# Patient Record
Sex: FEMALE | Race: WHITE | NOT HISPANIC OR LATINO | Employment: OTHER | URBAN - METROPOLITAN AREA
[De-identification: names, ages, dates, MRNs, and addresses within clinical notes are randomized per-mention and may not be internally consistent; named-entity substitution may affect disease eponyms.]

---

## 2017-05-30 ENCOUNTER — CONVERSION ENCOUNTER (OUTPATIENT)
Dept: OTHER | Facility: HOSPITAL | Age: 77
End: 2017-05-30

## 2018-01-22 ENCOUNTER — OFFICE VISIT CONVERTED (OUTPATIENT)
Dept: FAMILY MEDICINE CLINIC | Age: 78
End: 2018-01-22
Attending: FAMILY MEDICINE

## 2018-02-15 ENCOUNTER — OFFICE VISIT CONVERTED (OUTPATIENT)
Dept: FAMILY MEDICINE CLINIC | Age: 78
End: 2018-02-15
Attending: FAMILY MEDICINE

## 2018-06-11 ENCOUNTER — CONVERSION ENCOUNTER (OUTPATIENT)
Dept: OTHER | Facility: HOSPITAL | Age: 78
End: 2018-06-11

## 2018-07-19 ENCOUNTER — OFFICE VISIT CONVERTED (OUTPATIENT)
Dept: FAMILY MEDICINE CLINIC | Age: 78
End: 2018-07-19
Attending: FAMILY MEDICINE

## 2018-08-02 ENCOUNTER — OFFICE VISIT CONVERTED (OUTPATIENT)
Dept: FAMILY MEDICINE CLINIC | Age: 78
End: 2018-08-02
Attending: NURSE PRACTITIONER

## 2018-10-05 ENCOUNTER — OFFICE VISIT CONVERTED (OUTPATIENT)
Dept: FAMILY MEDICINE CLINIC | Age: 78
End: 2018-10-05
Attending: NURSE PRACTITIONER

## 2018-11-26 ENCOUNTER — OFFICE VISIT CONVERTED (OUTPATIENT)
Dept: FAMILY MEDICINE CLINIC | Age: 78
End: 2018-11-26
Attending: FAMILY MEDICINE

## 2018-12-07 ENCOUNTER — OFFICE VISIT CONVERTED (OUTPATIENT)
Dept: FAMILY MEDICINE CLINIC | Age: 78
End: 2018-12-07
Attending: FAMILY MEDICINE

## 2019-01-10 ENCOUNTER — HOSPITAL ENCOUNTER (OUTPATIENT)
Dept: OTHER | Facility: HOSPITAL | Age: 79
Discharge: HOME | End: 2019-01-10
Attending: FAMILY MEDICINE

## 2019-01-10 LAB
ALT SERPL-CCNC: 12 U/L (ref 10–40)
CHOLEST SERPL-MCNC: 81 MG/DL (ref 107–200)
CHOLEST/HDLC SERPL: 2.2 {RATIO} (ref 3–6)
HDLC SERPL-MCNC: 37 MG/DL (ref 40–60)
LDLC SERPL CALC-MCNC: 15 MG/DL (ref 70–100)
TRIGL SERPL-MCNC: 144 MG/DL (ref 40–150)
VLDLC SERPL-MCNC: 29 MG/DL (ref 5–37)

## 2019-02-18 ENCOUNTER — OFFICE VISIT CONVERTED (OUTPATIENT)
Dept: FAMILY MEDICINE CLINIC | Age: 79
End: 2019-02-18
Attending: FAMILY MEDICINE

## 2019-02-18 ENCOUNTER — HOSPITAL ENCOUNTER (OUTPATIENT)
Dept: OTHER | Facility: HOSPITAL | Age: 79
Discharge: HOME | End: 2019-02-18
Attending: FAMILY MEDICINE

## 2019-02-18 LAB
ALBUMIN SERPL-MCNC: 4 G/DL (ref 3.5–5)
ALBUMIN/GLOB SERPL: 1.2 {RATIO} (ref 1.4–2.6)
ALP SERPL-CCNC: 112 U/L (ref 43–160)
ALT SERPL-CCNC: 16 U/L (ref 10–40)
ANION GAP SERPL CALC-SCNC: 17 MMOL/L (ref 8–19)
AST SERPL-CCNC: 19 U/L (ref 15–50)
BILIRUB SERPL-MCNC: 0.32 MG/DL (ref 0.2–1.3)
BUN SERPL-MCNC: 13 MG/DL (ref 5–25)
BUN/CREAT SERPL: 19 {RATIO} (ref 6–20)
CALCIUM SERPL-MCNC: 9.3 MG/DL (ref 8.7–10.4)
CHLORIDE SERPL-SCNC: 101 MMOL/L (ref 99–111)
CONV CO2: 27 MMOL/L (ref 22–32)
CONV TOTAL PROTEIN: 7.4 G/DL (ref 6.3–8.2)
CREAT UR-MCNC: 0.67 MG/DL (ref 0.5–0.9)
ERYTHROCYTE [DISTWIDTH] IN BLOOD BY AUTOMATED COUNT: 12.9 % (ref 11.5–14.5)
ERYTHROCYTE [SEDIMENTATION RATE] IN BLOOD: 15 MM/H (ref 0–30)
GFR SERPLBLD BASED ON 1.73 SQ M-ARVRAT: >60 ML/MIN/{1.73_M2}
GLOBULIN UR ELPH-MCNC: 3.4 G/DL (ref 2–3.5)
GLUCOSE SERPL-MCNC: 87 MG/DL (ref 65–99)
HBA1C MFR BLD: 13.5 G/DL (ref 12–16)
HCT VFR BLD AUTO: 42.2 % (ref 37–47)
MAGNESIUM SERPL-MCNC: 2.02 MG/DL (ref 1.6–2.3)
MCH RBC QN AUTO: 28 PG (ref 27–31)
MCHC RBC AUTO-ENTMCNC: 32 G/DL (ref 33–37)
MCV RBC AUTO: 87.6 FL (ref 81–99)
OSMOLALITY SERPL CALC.SUM OF ELEC: 289 MOSM/KG (ref 273–304)
PLATELET # BLD AUTO: 177 10*3/UL (ref 130–400)
PMV BLD AUTO: 11.7 FL (ref 7.4–10.4)
POTASSIUM SERPL-SCNC: 4.6 MMOL/L (ref 3.5–5.3)
RBC # BLD AUTO: 4.82 10*6/UL (ref 4.2–5.4)
SODIUM SERPL-SCNC: 140 MMOL/L (ref 135–147)
TSH SERPL-ACNC: 0.84 M[IU]/L (ref 0.27–4.2)
URATE SERPL-MCNC: 6.3 MG/DL (ref 2.5–7.5)
WBC # BLD AUTO: 5.97 10*3/UL (ref 4.8–10.8)

## 2019-02-21 ENCOUNTER — CONVERSION ENCOUNTER (OUTPATIENT)
Dept: CARDIOLOGY | Facility: CLINIC | Age: 79
End: 2019-02-21
Attending: INTERNAL MEDICINE

## 2019-04-26 ENCOUNTER — OFFICE VISIT CONVERTED (OUTPATIENT)
Dept: FAMILY MEDICINE CLINIC | Age: 79
End: 2019-04-26
Attending: FAMILY MEDICINE

## 2019-04-26 ENCOUNTER — HOSPITAL ENCOUNTER (OUTPATIENT)
Dept: OTHER | Facility: HOSPITAL | Age: 79
Discharge: HOME | End: 2019-04-26
Attending: FAMILY MEDICINE

## 2019-04-26 LAB
ALBUMIN SERPL-MCNC: 3.9 G/DL (ref 3.5–5)
ALBUMIN/GLOB SERPL: 1.3 {RATIO} (ref 1.4–2.6)
ALP SERPL-CCNC: 117 U/L (ref 43–160)
ALT SERPL-CCNC: 14 U/L (ref 10–40)
ANION GAP SERPL CALC-SCNC: 18 MMOL/L (ref 8–19)
AST SERPL-CCNC: 16 U/L (ref 15–50)
BILIRUB SERPL-MCNC: 0.44 MG/DL (ref 0.2–1.3)
BUN SERPL-MCNC: 13 MG/DL (ref 5–25)
BUN/CREAT SERPL: 17 {RATIO} (ref 6–20)
CALCIUM SERPL-MCNC: 9.1 MG/DL (ref 8.7–10.4)
CHLORIDE SERPL-SCNC: 103 MMOL/L (ref 99–111)
CHOLEST SERPL-MCNC: 112 MG/DL (ref 107–200)
CHOLEST/HDLC SERPL: 3 {RATIO} (ref 3–6)
CK SERPL-CCNC: 23 U/L (ref 35–230)
CONV CO2: 24 MMOL/L (ref 22–32)
CONV CREATININE URINE, RANDOM: 90.2 MG/DL (ref 10–300)
CONV MICROALBUM.,U,RANDOM: 67.3 MG/L (ref 0–20)
CONV TOTAL PROTEIN: 6.8 G/DL (ref 6.3–8.2)
CREAT UR-MCNC: 0.78 MG/DL (ref 0.5–0.9)
ERYTHROCYTE [DISTWIDTH] IN BLOOD BY AUTOMATED COUNT: 13.9 % (ref 11.5–14.5)
EST. AVERAGE GLUCOSE BLD GHB EST-MCNC: 143 MG/DL
FOLATE SERPL-MCNC: 11.1 NG/ML (ref 4.8–20)
GFR SERPLBLD BASED ON 1.73 SQ M-ARVRAT: >60 ML/MIN/{1.73_M2}
GLOBULIN UR ELPH-MCNC: 2.9 G/DL (ref 2–3.5)
GLUCOSE SERPL-MCNC: 108 MG/DL (ref 65–99)
HBA1C MFR BLD: 12.8 G/DL (ref 12–16)
HBA1C MFR BLD: 6.6 % (ref 3.5–5.7)
HCT VFR BLD AUTO: 38.2 % (ref 37–47)
HDLC SERPL-MCNC: 37 MG/DL (ref 40–60)
LDLC SERPL CALC-MCNC: 37 MG/DL (ref 70–100)
MAGNESIUM SERPL-MCNC: 1.99 MG/DL (ref 1.6–2.3)
MCH RBC QN AUTO: 28.1 PG (ref 27–31)
MCHC RBC AUTO-ENTMCNC: 33.5 G/DL (ref 33–37)
MCV RBC AUTO: 84 FL (ref 81–99)
MICROALBUMIN/CREAT UR: 74.6 MG/G{CRE} (ref 0–35)
OSMOLALITY SERPL CALC.SUM OF ELEC: 291 MOSM/KG (ref 273–304)
PLATELET # BLD AUTO: 150 10*3/UL (ref 130–400)
PMV BLD AUTO: 12.8 FL (ref 7.4–10.4)
POTASSIUM SERPL-SCNC: 4.7 MMOL/L (ref 3.5–5.3)
RBC # BLD AUTO: 4.55 10*6/UL (ref 4.2–5.4)
SODIUM SERPL-SCNC: 140 MMOL/L (ref 135–147)
TRIGL SERPL-MCNC: 191 MG/DL (ref 40–150)
TSH SERPL-ACNC: 1.34 M[IU]/L (ref 0.27–4.2)
VIT B12 SERPL-MCNC: 1446 PG/ML (ref 211–911)
VLDLC SERPL-MCNC: 38 MG/DL (ref 5–37)
WBC # BLD AUTO: 6.66 10*3/UL (ref 4.8–10.8)

## 2019-04-27 LAB
ASO AB SERPL-ACNC: 37 [IU]/ML (ref 0–200)
CONV ANTI NUCLEAR ANTIBODY WITH REFLEX: POSITIVE
CONV RHEUMATOID FACTOR IGM: <10 [IU]/ML (ref 0–14)
CRP SERPL-MCNC: 0.9 MG/L (ref 0–5)
ERYTHROCYTE [SEDIMENTATION RATE] IN BLOOD: 8 MM/H (ref 0–30)
PHOSPHATE SERPL-MCNC: 3.3 MG/DL (ref 2.4–4.5)
URATE SERPL-MCNC: 5.6 MG/DL (ref 2.5–7.5)

## 2019-04-29 LAB
B BURGDOR IGG+IGM SER-ACNC: NORMAL
CENTROMERE B AB SER-ACNC: <0.2 AI (ref 0–0.9)
CHROMATIN AB: 0.3 AI (ref 0–0.9)
CONV ANTI DOUBLE STRAND DNA  (REFLEX): 1 IU/ML (ref 0–9)
CONV SS-A ANTIBODY (REFLEX): <0.2 AI (ref 0–0.9)
CONV SS-B ANTIBODY (REFLEX): <0.2 AI (ref 0–0.9)
ENA JO1 AB SER IA-ACNC: <0.2 AI (ref 0–0.9)
ENA RNP AB SER-ACNC: 0.7 AI (ref 0–0.9)
ENA SCL70 AB SER QL IA: <0.2 AI (ref 0–0.9)
ENA SM AB SER-ACNC: 0.2 AI (ref 0–0.9)
Lab: NORMAL

## 2019-06-12 ENCOUNTER — HOSPITAL ENCOUNTER (OUTPATIENT)
Dept: OTHER | Facility: HOSPITAL | Age: 79
Discharge: HOME | End: 2019-06-12
Attending: FAMILY MEDICINE

## 2019-06-27 ENCOUNTER — HOSPITAL ENCOUNTER (OUTPATIENT)
Dept: OTHER | Facility: HOSPITAL | Age: 79
Discharge: HOME | End: 2019-06-27
Attending: FAMILY MEDICINE

## 2019-07-01 ENCOUNTER — OFFICE VISIT CONVERTED (OUTPATIENT)
Dept: FAMILY MEDICINE CLINIC | Age: 79
End: 2019-07-01
Attending: FAMILY MEDICINE

## 2019-07-09 ENCOUNTER — OFFICE VISIT CONVERTED (OUTPATIENT)
Dept: FAMILY MEDICINE CLINIC | Age: 79
End: 2019-07-09
Attending: FAMILY MEDICINE

## 2019-07-29 ENCOUNTER — HOSPITAL ENCOUNTER (OUTPATIENT)
Dept: OTHER | Facility: HOSPITAL | Age: 79
Discharge: HOME | End: 2019-07-29
Attending: FAMILY MEDICINE

## 2019-07-29 LAB
CHOLEST SERPL-MCNC: 156 MG/DL (ref 107–200)
CHOLEST/HDLC SERPL: 4.9 {RATIO} (ref 3–6)
EST. AVERAGE GLUCOSE BLD GHB EST-MCNC: 134 MG/DL
HBA1C MFR BLD: 6.3 % (ref 3.5–5.7)
HDLC SERPL-MCNC: 32 MG/DL (ref 40–60)
LDLC SERPL CALC-MCNC: 72 MG/DL (ref 70–100)
TRIGL SERPL-MCNC: 260 MG/DL (ref 40–150)
VLDLC SERPL-MCNC: 52 MG/DL (ref 5–37)

## 2019-12-02 ENCOUNTER — OFFICE VISIT CONVERTED (OUTPATIENT)
Dept: FAMILY MEDICINE CLINIC | Age: 79
End: 2019-12-02
Attending: FAMILY MEDICINE

## 2019-12-12 ENCOUNTER — OFFICE VISIT CONVERTED (OUTPATIENT)
Dept: FAMILY MEDICINE CLINIC | Age: 79
End: 2019-12-12
Attending: FAMILY MEDICINE

## 2019-12-12 ENCOUNTER — HOSPITAL ENCOUNTER (OUTPATIENT)
Dept: OTHER | Facility: HOSPITAL | Age: 79
Discharge: HOME | End: 2019-12-12
Attending: FAMILY MEDICINE

## 2020-01-07 ENCOUNTER — HOSPITAL ENCOUNTER (OUTPATIENT)
Dept: OTHER | Facility: HOSPITAL | Age: 80
Discharge: HOME | End: 2020-01-07
Attending: FAMILY MEDICINE

## 2020-01-07 ENCOUNTER — OFFICE VISIT CONVERTED (OUTPATIENT)
Dept: FAMILY MEDICINE CLINIC | Age: 80
End: 2020-01-07
Attending: FAMILY MEDICINE

## 2020-01-07 LAB
ALBUMIN SERPL-MCNC: 4.2 G/DL (ref 3.5–5)
ALBUMIN/GLOB SERPL: 1.4 {RATIO} (ref 1.4–2.6)
ALP SERPL-CCNC: 87 U/L (ref 43–160)
ALT SERPL-CCNC: 15 U/L (ref 10–40)
ANION GAP SERPL CALC-SCNC: 16 MMOL/L (ref 8–19)
AST SERPL-CCNC: 19 U/L (ref 15–50)
BILIRUB SERPL-MCNC: 0.33 MG/DL (ref 0.2–1.3)
BUN SERPL-MCNC: 15 MG/DL (ref 5–25)
BUN/CREAT SERPL: 19 {RATIO} (ref 6–20)
CALCIUM SERPL-MCNC: 9.6 MG/DL (ref 8.7–10.4)
CHLORIDE SERPL-SCNC: 101 MMOL/L (ref 99–111)
CONV CO2: 25 MMOL/L (ref 22–32)
CONV TOTAL PROTEIN: 7.2 G/DL (ref 6.3–8.2)
CREAT UR-MCNC: 0.8 MG/DL (ref 0.5–0.9)
ERYTHROCYTE [DISTWIDTH] IN BLOOD BY AUTOMATED COUNT: 13.8 % (ref 11.5–14.5)
ERYTHROCYTE [SEDIMENTATION RATE] IN BLOOD: 17 MM/H (ref 0–30)
EST. AVERAGE GLUCOSE BLD GHB EST-MCNC: 126 MG/DL
GFR SERPLBLD BASED ON 1.73 SQ M-ARVRAT: >60 ML/MIN/{1.73_M2}
GLOBULIN UR ELPH-MCNC: 3 G/DL (ref 2–3.5)
GLUCOSE SERPL-MCNC: 108 MG/DL (ref 65–99)
HBA1C MFR BLD: 12.8 G/DL (ref 12–16)
HBA1C MFR BLD: 6 % (ref 3.5–5.7)
HCT VFR BLD AUTO: 39.3 % (ref 37–47)
MCH RBC QN AUTO: 28.7 PG (ref 27–31)
MCHC RBC AUTO-ENTMCNC: 32.6 G/DL (ref 33–37)
MCV RBC AUTO: 88.1 FL (ref 81–99)
OSMOLALITY SERPL CALC.SUM OF ELEC: 285 MOSM/KG (ref 273–304)
PLATELET # BLD AUTO: 204 10*3/UL (ref 130–400)
PMV BLD AUTO: 11.4 FL (ref 7.4–10.4)
POTASSIUM SERPL-SCNC: 5.4 MMOL/L (ref 3.5–5.3)
RBC # BLD AUTO: 4.46 10*6/UL (ref 4.2–5.4)
SODIUM SERPL-SCNC: 137 MMOL/L (ref 135–147)
TSH SERPL-ACNC: 4.58 M[IU]/L (ref 0.27–4.2)
WBC # BLD AUTO: 7.03 10*3/UL (ref 4.8–10.8)

## 2020-01-08 LAB
C DIFF TOX B STL QL CT TISS CULT: NEGATIVE
CONV 027 TOXIN: NEGATIVE

## 2020-01-10 LAB
BACTERIA SPEC AEROBE CULT: NORMAL
O+P SPEC MICRO: NORMAL
O+P SPEC MICRO: NORMAL

## 2020-02-05 ENCOUNTER — OFFICE VISIT CONVERTED (OUTPATIENT)
Dept: FAMILY MEDICINE CLINIC | Age: 80
End: 2020-02-05
Attending: FAMILY MEDICINE

## 2020-02-05 ENCOUNTER — HOSPITAL ENCOUNTER (OUTPATIENT)
Dept: OTHER | Facility: HOSPITAL | Age: 80
Discharge: HOME | End: 2020-02-05
Attending: FAMILY MEDICINE

## 2020-02-17 ENCOUNTER — OFFICE VISIT CONVERTED (OUTPATIENT)
Dept: FAMILY MEDICINE CLINIC | Age: 80
End: 2020-02-17
Attending: FAMILY MEDICINE

## 2020-02-17 ENCOUNTER — HOSPITAL ENCOUNTER (OUTPATIENT)
Dept: OTHER | Facility: HOSPITAL | Age: 80
Discharge: HOME | End: 2020-02-17
Attending: FAMILY MEDICINE

## 2020-02-17 LAB
ERYTHROCYTE [DISTWIDTH] IN BLOOD BY AUTOMATED COUNT: 13.4 % (ref 11.5–14.5)
HBA1C MFR BLD: 13.2 G/DL (ref 12–16)
HCT VFR BLD AUTO: 40.5 % (ref 37–47)
MCH RBC QN AUTO: 28.7 PG (ref 27–31)
MCHC RBC AUTO-ENTMCNC: 32.6 G/DL (ref 33–37)
MCV RBC AUTO: 88 FL (ref 81–99)
PLATELET # BLD AUTO: 169 10*3/UL (ref 130–400)
PMV BLD AUTO: 12.1 FL (ref 7.4–10.4)
RBC # BLD AUTO: 4.6 10*6/UL (ref 4.2–5.4)
WBC # BLD AUTO: 17.61 10*3/UL (ref 4.8–10.8)

## 2020-02-19 LAB — BACTERIA SPEC AEROBE CULT: NORMAL

## 2020-02-24 ENCOUNTER — HOSPITAL ENCOUNTER (OUTPATIENT)
Dept: OTHER | Facility: HOSPITAL | Age: 80
Discharge: HOME | End: 2020-02-24
Attending: FAMILY MEDICINE

## 2020-02-24 ENCOUNTER — OFFICE VISIT CONVERTED (OUTPATIENT)
Dept: FAMILY MEDICINE CLINIC | Age: 80
End: 2020-02-24
Attending: FAMILY MEDICINE

## 2020-02-24 LAB
ERYTHROCYTE [DISTWIDTH] IN BLOOD BY AUTOMATED COUNT: 13.4 % (ref 11.5–14.5)
HBA1C MFR BLD: 13 G/DL (ref 12–16)
HCT VFR BLD AUTO: 39.1 % (ref 37–47)
MCH RBC QN AUTO: 29.1 PG (ref 27–31)
MCHC RBC AUTO-ENTMCNC: 33.2 G/DL (ref 33–37)
MCV RBC AUTO: 87.5 FL (ref 81–99)
PLATELET # BLD AUTO: 197 10*3/UL (ref 130–400)
PMV BLD AUTO: 10.5 FL (ref 7.4–10.4)
RBC # BLD AUTO: 4.47 10*6/UL (ref 4.2–5.4)
TSH SERPL-ACNC: 21.08 M[IU]/L (ref 0.27–4.2)
WBC # BLD AUTO: 7.22 10*3/UL (ref 4.8–10.8)

## 2020-03-12 ENCOUNTER — HOSPITAL ENCOUNTER (OUTPATIENT)
Dept: OTHER | Facility: HOSPITAL | Age: 80
Discharge: HOME | End: 2020-03-12
Attending: FAMILY MEDICINE

## 2020-03-12 ENCOUNTER — OFFICE VISIT CONVERTED (OUTPATIENT)
Dept: FAMILY MEDICINE CLINIC | Age: 80
End: 2020-03-12
Attending: FAMILY MEDICINE

## 2020-03-12 LAB
APPEARANCE UR: ABNORMAL
BACTERIA UR CULT: NORMAL
BACTERIA UR QL AUTO: ABNORMAL
BILIRUB UR QL: NEGATIVE
CASTS URNS QL MICRO: ABNORMAL /[LPF]
COLOR UR: YELLOW
CONV LEUKOCYTE ESTERASE: ABNORMAL
CONV UROBILINOGEN IN URINE BY AUTOMATED TEST STRIP: 0.2 {EHRLICHU}/DL (ref 0.1–1)
EPI CELLS #/AREA URNS HPF: ABNORMAL /[HPF]
GLUCOSE 24H UR-MCNC: NEGATIVE MG/DL
HGB UR QL STRIP: ABNORMAL
KETONES UR QL STRIP: NEGATIVE MG/DL
MUCOUS THREADS URNS QL MICRO: ABNORMAL
NITRITE UR-MCNC: NEGATIVE MG/ML
PH UR STRIP.AUTO: 6 [PH] (ref 5–8)
PROT UR-MCNC: NEGATIVE MG/DL
RBC # BLD AUTO: ABNORMAL /[HPF]
SP GR UR STRIP: 1.02 (ref 1–1.03)
SPECIMEN SOURCE: ABNORMAL
UNIDENT CRYS URNS QL MICRO: ABNORMAL /[HPF]
WBC #/AREA URNS HPF: ABNORMAL /[HPF]

## 2020-03-13 ENCOUNTER — HOSPITAL ENCOUNTER (OUTPATIENT)
Dept: OTHER | Facility: HOSPITAL | Age: 80
Discharge: HOME | End: 2020-03-13
Attending: FAMILY MEDICINE

## 2020-03-14 LAB — BACTERIA UR CULT: NORMAL

## 2020-05-28 ENCOUNTER — HOSPITAL ENCOUNTER (OUTPATIENT)
Dept: OTHER | Facility: HOSPITAL | Age: 80
Discharge: HOME | End: 2020-05-28
Attending: FAMILY MEDICINE

## 2020-05-28 ENCOUNTER — OFFICE VISIT CONVERTED (OUTPATIENT)
Dept: FAMILY MEDICINE CLINIC | Age: 80
End: 2020-05-28
Attending: FAMILY MEDICINE

## 2020-05-28 LAB
ALBUMIN SERPL-MCNC: 3.8 G/DL (ref 3.5–5)
ALBUMIN/GLOB SERPL: 1.4 {RATIO} (ref 1.4–2.6)
ALP SERPL-CCNC: 89 U/L (ref 43–160)
ALT SERPL-CCNC: 15 U/L (ref 10–40)
ANION GAP SERPL CALC-SCNC: 14 MMOL/L (ref 8–19)
APPEARANCE UR: CLEAR
AST SERPL-CCNC: 20 U/L (ref 15–50)
BACTERIA UR CULT: NORMAL
BACTERIA UR QL AUTO: ABNORMAL
BILIRUB SERPL-MCNC: 0.44 MG/DL (ref 0.2–1.3)
BILIRUB UR QL: NEGATIVE
BUN SERPL-MCNC: 14 MG/DL (ref 5–25)
BUN/CREAT SERPL: 18 {RATIO} (ref 6–20)
CALCIUM SERPL-MCNC: 9.2 MG/DL (ref 8.7–10.4)
CASTS URNS QL MICRO: ABNORMAL /[LPF]
CHLORIDE SERPL-SCNC: 101 MMOL/L (ref 99–111)
CHOLEST SERPL-MCNC: 148 MG/DL (ref 107–200)
CHOLEST/HDLC SERPL: 4.6 {RATIO} (ref 3–6)
COLOR UR: ABNORMAL
CONV CO2: 26 MMOL/L (ref 22–32)
CONV CREATININE URINE, RANDOM: 38.8 MG/DL (ref 10–300)
CONV LEUKOCYTE ESTERASE: ABNORMAL
CONV MICROALBUM.,U,RANDOM: 12.9 MG/L (ref 0–20)
CONV TOTAL PROTEIN: 6.5 G/DL (ref 6.3–8.2)
CONV UROBILINOGEN IN URINE BY AUTOMATED TEST STRIP: 0.2 {EHRLICHU}/DL (ref 0.1–1)
CREAT UR-MCNC: 0.78 MG/DL (ref 0.5–0.9)
EPI CELLS #/AREA URNS HPF: ABNORMAL /[HPF]
EST. AVERAGE GLUCOSE BLD GHB EST-MCNC: 151 MG/DL
GFR SERPLBLD BASED ON 1.73 SQ M-ARVRAT: >60 ML/MIN/{1.73_M2}
GLOBULIN UR ELPH-MCNC: 2.7 G/DL (ref 2–3.5)
GLUCOSE 24H UR-MCNC: NEGATIVE MG/DL
GLUCOSE SERPL-MCNC: 126 MG/DL (ref 65–99)
HBA1C MFR BLD: 6.9 % (ref 3.5–5.7)
HDLC SERPL-MCNC: 32 MG/DL (ref 40–60)
HGB UR QL STRIP: NEGATIVE
KETONES UR QL STRIP: NEGATIVE MG/DL
LDLC SERPL CALC-MCNC: 66 MG/DL (ref 70–100)
MICROALBUMIN/CREAT UR: 33.2 MG/G{CRE} (ref 0–35)
MUCOUS THREADS URNS QL MICRO: ABNORMAL
NITRITE UR-MCNC: NEGATIVE MG/ML
OSMOLALITY SERPL CALC.SUM OF ELEC: 284 MOSM/KG (ref 273–304)
PH UR STRIP.AUTO: 6 [PH] (ref 5–8)
POTASSIUM SERPL-SCNC: 4.9 MMOL/L (ref 3.5–5.3)
PROT UR-MCNC: NEGATIVE MG/DL
RBC # BLD AUTO: ABNORMAL /[HPF]
SODIUM SERPL-SCNC: 136 MMOL/L (ref 135–147)
SP GR UR STRIP: 1.01 (ref 1–1.03)
SPECIMEN SOURCE: ABNORMAL
TRIGL SERPL-MCNC: 249 MG/DL (ref 40–150)
TSH SERPL-ACNC: 3.37 M[IU]/L (ref 0.27–4.2)
UNIDENT CRYS URNS QL MICRO: ABNORMAL /[HPF]
VLDLC SERPL-MCNC: 50 MG/DL (ref 5–37)
WBC #/AREA URNS HPF: ABNORMAL /[HPF]

## 2020-05-30 LAB — BACTERIA UR CULT: NORMAL

## 2020-12-10 ENCOUNTER — OFFICE VISIT CONVERTED (OUTPATIENT)
Dept: FAMILY MEDICINE CLINIC | Age: 80
End: 2020-12-10
Attending: FAMILY MEDICINE

## 2020-12-14 ENCOUNTER — HOSPITAL ENCOUNTER (OUTPATIENT)
Dept: OTHER | Facility: HOSPITAL | Age: 80
Discharge: HOME | End: 2020-12-14
Attending: FAMILY MEDICINE

## 2020-12-14 LAB
ALBUMIN SERPL-MCNC: 4.1 G/DL (ref 3.5–5)
ALBUMIN/GLOB SERPL: 1.4 {RATIO} (ref 1.4–2.6)
ALP SERPL-CCNC: 122 U/L (ref 43–160)
ALT SERPL-CCNC: 16 U/L (ref 10–40)
ANION GAP SERPL CALC-SCNC: 15 MMOL/L (ref 8–19)
AST SERPL-CCNC: 19 U/L (ref 15–50)
BILIRUB SERPL-MCNC: 0.29 MG/DL (ref 0.2–1.3)
BUN SERPL-MCNC: 16 MG/DL (ref 5–25)
BUN/CREAT SERPL: 18 {RATIO} (ref 6–20)
CALCIUM SERPL-MCNC: 9.5 MG/DL (ref 8.7–10.4)
CHLORIDE SERPL-SCNC: 102 MMOL/L (ref 99–111)
CONV CO2: 28 MMOL/L (ref 22–32)
CONV TOTAL PROTEIN: 7.1 G/DL (ref 6.3–8.2)
CREAT UR-MCNC: 0.91 MG/DL (ref 0.5–0.9)
ERYTHROCYTE [DISTWIDTH] IN BLOOD BY AUTOMATED COUNT: 13.2 % (ref 11.5–14.5)
EST. AVERAGE GLUCOSE BLD GHB EST-MCNC: 163 MG/DL
GFR SERPLBLD BASED ON 1.73 SQ M-ARVRAT: 59 ML/MIN/{1.73_M2}
GLOBULIN UR ELPH-MCNC: 3 G/DL (ref 2–3.5)
GLUCOSE SERPL-MCNC: 145 MG/DL (ref 65–99)
HBA1C MFR BLD: 13.5 G/DL (ref 12–16)
HBA1C MFR BLD: 7.3 % (ref 3.5–5.7)
HCT VFR BLD AUTO: 42.3 % (ref 37–47)
MCH RBC QN AUTO: 28.3 PG (ref 27–31)
MCHC RBC AUTO-ENTMCNC: 31.9 G/DL (ref 33–37)
MCV RBC AUTO: 88.7 FL (ref 81–99)
OSMOLALITY SERPL CALC.SUM OF ELEC: 294 MOSM/KG (ref 273–304)
PLATELET # BLD AUTO: 191 10*3/UL (ref 130–400)
PMV BLD AUTO: 11.9 FL (ref 7.4–10.4)
POTASSIUM SERPL-SCNC: 5 MMOL/L (ref 3.5–5.3)
RBC # BLD AUTO: 4.77 10*6/UL (ref 4.2–5.4)
SODIUM SERPL-SCNC: 140 MMOL/L (ref 135–147)
TSH SERPL-ACNC: 0.26 M[IU]/L (ref 0.27–4.2)
WBC # BLD AUTO: 6.73 10*3/UL (ref 4.8–10.8)

## 2021-03-10 ENCOUNTER — HOSPITAL ENCOUNTER (OUTPATIENT)
Dept: OTHER | Facility: HOSPITAL | Age: 81
Discharge: HOME | End: 2021-03-10
Attending: FAMILY MEDICINE

## 2021-03-10 LAB
ALBUMIN SERPL-MCNC: 3.9 G/DL (ref 3.5–5)
ALBUMIN/GLOB SERPL: 1.4 {RATIO} (ref 1.4–2.6)
ALP SERPL-CCNC: 98 U/L (ref 43–160)
ALT SERPL-CCNC: 13 U/L (ref 10–40)
ANION GAP SERPL CALC-SCNC: 12 MMOL/L (ref 8–19)
AST SERPL-CCNC: 18 U/L (ref 15–50)
BILIRUB SERPL-MCNC: 0.42 MG/DL (ref 0.2–1.3)
BUN SERPL-MCNC: 15 MG/DL (ref 5–25)
BUN/CREAT SERPL: 23 {RATIO} (ref 6–20)
CALCIUM SERPL-MCNC: 9.2 MG/DL (ref 8.7–10.4)
CHLORIDE SERPL-SCNC: 105 MMOL/L (ref 99–111)
CHOLEST SERPL-MCNC: 119 MG/DL (ref 107–200)
CHOLEST/HDLC SERPL: 3.1 {RATIO} (ref 3–6)
CONV CO2: 26 MMOL/L (ref 22–32)
CONV TOTAL PROTEIN: 6.6 G/DL (ref 6.3–8.2)
CREAT UR-MCNC: 0.64 MG/DL (ref 0.5–0.9)
EST. AVERAGE GLUCOSE BLD GHB EST-MCNC: 192 MG/DL
GFR SERPLBLD BASED ON 1.73 SQ M-ARVRAT: >60 ML/MIN/{1.73_M2}
GLOBULIN UR ELPH-MCNC: 2.7 G/DL (ref 2–3.5)
GLUCOSE SERPL-MCNC: 145 MG/DL (ref 65–99)
HBA1C MFR BLD: 8.3 % (ref 3.5–5.7)
HDLC SERPL-MCNC: 39 MG/DL (ref 40–60)
LDLC SERPL CALC-MCNC: 56 MG/DL (ref 70–100)
OSMOLALITY SERPL CALC.SUM OF ELEC: 291 MOSM/KG (ref 273–304)
POTASSIUM SERPL-SCNC: 4.4 MMOL/L (ref 3.5–5.3)
SODIUM SERPL-SCNC: 139 MMOL/L (ref 135–147)
TRIGL SERPL-MCNC: 119 MG/DL (ref 40–150)
TSH SERPL-ACNC: 0.35 M[IU]/L (ref 0.27–4.2)
VLDLC SERPL-MCNC: 24 MG/DL (ref 5–37)

## 2021-03-22 ENCOUNTER — HOSPITAL ENCOUNTER (OUTPATIENT)
Dept: OTHER | Facility: HOSPITAL | Age: 81
Discharge: HOME | End: 2021-03-22
Attending: FAMILY MEDICINE

## 2021-03-22 ENCOUNTER — OFFICE VISIT CONVERTED (OUTPATIENT)
Dept: FAMILY MEDICINE CLINIC | Age: 81
End: 2021-03-22
Attending: FAMILY MEDICINE

## 2021-03-22 LAB
ERYTHROCYTE [DISTWIDTH] IN BLOOD BY AUTOMATED COUNT: 14.2 % (ref 11.5–14.5)
ERYTHROCYTE [SEDIMENTATION RATE] IN BLOOD: 13 MM/H (ref 0–30)
HBA1C MFR BLD: 13.1 G/DL (ref 12–16)
HCT VFR BLD AUTO: 39.8 % (ref 37–47)
MAGNESIUM SERPL-MCNC: 1.96 MG/DL (ref 1.6–2.3)
MCH RBC QN AUTO: 28.2 PG (ref 27–31)
MCHC RBC AUTO-ENTMCNC: 32.9 G/DL (ref 33–37)
MCV RBC AUTO: 85.8 FL (ref 81–99)
PLATELET # BLD AUTO: 183 10*3/UL (ref 130–400)
PMV BLD AUTO: 12 FL (ref 7.4–10.4)
RBC # BLD AUTO: 4.64 10*6/UL (ref 4.2–5.4)
WBC # BLD AUTO: 5.94 10*3/UL (ref 4.8–10.8)

## 2021-03-25 ENCOUNTER — CONVERSION ENCOUNTER (OUTPATIENT)
Dept: CARDIOLOGY | Facility: CLINIC | Age: 81
End: 2021-03-25
Attending: INTERNAL MEDICINE

## 2021-05-10 NOTE — PROCEDURES
Procedure Note      Patient Name: Zoila Whipple   Patient ID: [id number]   Sex: Female   YOB: 1940    Primary Care Provider: Jewel Chavez MD   Referring Provider: Jewel Chavez MD    Visit Date: March 25, 2021    Provider: William Mclaughlin MD   Location: [facility]   Location Address: [address] [place] [address] A   [place], [place]  [id number]   Location Phone: [phone]          FINAL REPORT   HOLTER MONITOR REPORT  Date: 03/22/2021   Indication: Syncope      FINDINGS:   The patient underwent 24 hours of Holter monitoring. 63%  of the data was analyzable. Minimum heart rate of 53 beats per minute occurred at 7:15 AM.  Maximum heart rate of 111 beats per minute occurred at 5:16 PM.  Average heart rate was 78 beats per minute.  There were 178 PVCs and 37 PACs. No two-second pauses noted. Underlying rhythm was sinus. There was underlying bundle branch block.  No symptoms recorded in the diary.     CONCLUSION:   Unremarkable 24 hours of Holter monitoring  with underlying sinus rhythm, bundle branch block, and low-frequency PVCs.       William Mclaughlin MD, [facility]  PM/pap                   Electronically Signed by: Elsa Adams-, Other -Author on March 26, 2021 01:38:25 PM  Electronically Co-signed by: William Mclaughlin MD -Reviewer on March 28, 2021 04:21:43 PM

## 2021-05-18 NOTE — PROGRESS NOTES
Zoila Whipple 1940     Office/Outpatient Visit    Visit Date: Fri, Dec 7, 2018 09:39 am    Provider: Sky Smith MD (Assistant: Aziza Whipple MA)    Location: [facility]        Electronically signed by Sky Smith MD on  12/12/2018 02:30:57 PM                             SUBJECTIVE:        CC:     Zoila Dixon is a 78 year old White female.  Patient complains of lower back pain.;         HPI:     Burning with urination for the last 3-4 days. Also worsening back pain, no fever, but worsening back pain. She has fatigue but has also been up all night urinating. She has recurrent, frequent UTIs. UTI in July, September, and now. She had a bad UTI 2 years ago that required a PICC line and IV antibiotics qd for 90 days. Last urology visit was over a year ago in [place], Dr Pavon affiliated with [facility].     This is potentially pt's 10th UTI this year.     Pt has type 2 diabetes, A1c has trended down into prediabetic range this year and was 6.3 on 7/19.     ROS:     CONSTITUTIONAL:  Positive for fatigue ( mild ).   Negative for fever.      EYES:  Negative for blurred vision.      E/N/T:  Negative for diminished hearing and nasal congestion.      CARDIOVASCULAR:  Negative for chest pain and palpitations.      RESPIRATORY:  Negative for recent cough and dyspnea.      GASTROINTESTINAL:  Negative for abdominal pain, constipation, diarrhea, nausea and vomiting.      GENITOURINARY:  Positive for dysuria, frequent UTI's and polyuria.   Negative for urinary incontinence.      MUSCULOSKELETAL:  Positive for back pain.   Negative for arthralgias or myalgias.      INTEGUMENTARY/BREAST:  Negative for atypical mole(s) and rash.      NEUROLOGICAL:  Negative for paresthesias and weakness.      PSYCHIATRIC:  Negative for anxiety, depression and sleep disturbance.          PMH/FMH/SH:     Last Reviewed on 12/12/2018 02:30 PM by Sky Smith    Past Medical History:                 PAST  MEDICAL HISTORY         Hypertension    Aortic Valve Replacement - mechanical     Gastroesophageal Reflux Disease     Hypothyroidism         CURRENT MEDICAL PROVIDERS:    Cardiologist: Sky Bowman    Urologist: Dr. Jensen         PREVENTIVE HEALTH MAINTENANCE             BONE DENSITY: was last done 2017 with the following abnormality noted-- osteopenia     COLORECTAL CANCER SCREENING: Up to date (colonoscopy q10y; sigmoidoscopy q5y; Cologuard q3y) was last done 2014, Results are in chart; colonoscopy with the following abnormalities noted-- pelvic fixation - limited exam     EYE EXAM: Diabetic Eye Exam during this calendar year and results are in chart was last done 2018     MAMMOGRAM: Done within last 2 years and results in are chart was last done 18 with normal results         Surgical History:         Appendectomy: at age 24;     Hysterectomy: at age 24; complete;      Hernia Repair: right inguinal;     Laminectomy: lumbar region; 1975;     Aortic Valve Replacement;    Back Surgery;    Skin Cancer Removal; Procedures: colonoscopy /normal heart cath          Family History:         Positive for Myocardial Infarction ( father ).  Father:  at age 49; Cause of death was MI     Mother:  at age 94         Social History:     Occupation: Retired (Prior occupation: [email or web address])     Marital Status:       Children: 5 children         Tobacco/Alcohol/Supplements:     Last Reviewed on 2018 02:30 PM by Sky Smith    Tobacco: She has never smoked.          Alcohol:  Does not drink alcohol and never has.          Substance Abuse History:     Last Reviewed on 2018 02:30 PM by Sky Smith    NEGATIVE         Mental Health History:     Last Reviewed on 2018 02:30 PM by Sky Smith        Communicable Diseases (eg STDs):     Last Reviewed on 2018 02:30 PM by Sky Smith            Current Problems:     Last Reviewed on 2018 02:30 PM by  Sky Smith    History of recurrent UTI's     UTI     Moderate depression     Type 2 diabetes     Hypercholesterolemia     Use of high risk medications     Hypertension     Acquired hypothyroidism     Heart valve replacement patient     GERD     Acute maxillary sinusitis     Dysuria         Immunizations:     Prevnar 13 (Pneumococcal PCV 13) 9/21/2016     Flulaval 9/16/2011     Fluzone pf (3+ years dose) 10/8/2013     Fluzone High-Dose pf (>=65 yr) 10/13/2014     Fluzone High-Dose pf (>=65 yr) 10/26/2015     Fluzone High-Dose pf (>=65 yr) 9/21/2016     Fluzone High-Dose pf (>=65 yr) 9/21/2017     Fluzone High-Dose pf (>=65 yr) 10/5/2018     PNEUMOVAX 23 (Pneumococcal PPV23) 9/21/2017         Allergies:     Last Reviewed on 12/12/2018 02:30 PM by Sky Smith    Sulfonamides: rash    Niacin (Nicotinic Acid):    medrol dose pack: chills, rash (Adverse Reaction)        Current Medications:     Last Reviewed on 12/12/2018 02:30 PM by Sky Smith    Metformin HCl 500mg Tablets, Extended Release Take 2 tablet(s) by mouth daily     Pantoprazole 40mg Tablets, Delayed Release Take 1 tablet(s) by mouth daily     Synthroid 0.075mg Tablet Take 1 tablet(s) by mouth daily     Nystatin/Triamcinolone 100,000U/1gm/0.1% Cream Apply thin film to affected area bid     Accu-Chek Lana Plus Test Strips  Reagent Strips check blood sugar once daily  DXE11.9     Accu-Chek Softclix  Lancet Check blood sugar 1 x per day. DX: E11.9     Atorvastatin Calcium 40mg Tablet Take 1 tablet(s) by mouth daily     Potassium Chloride 20mEq Tablets, Extended Release Take 1 tablet(s) by mouth bid     CoQ10 daily     Fish Oil 1,000mg Softgel capsule 1 / day     Vitamin C     Vitamin D3     Vitamin E 1,000IU Capsules 1 capsule daily     Metoprolol Succinate 25mg Tablets, Extended Release 1 tab PO daily     Ciprofloxacin HCl 500mg Tablet Take 1 tablet(s) by mouth q12h for 10 days         OBJECTIVE:        Vitals:         Current: 12/7/2018  9:48:23 AM    Ht:  5 ft, 6 in;  Wt: 166.8 lbs;  BMI: 26.9    T: 97.6 F (oral);  BP: 136/55 mm Hg (left arm, sitting);  P: 67 bpm (left arm (BP Cuff), sitting);  sCr: 0.67 mg/dL;  GFR: 71.89        Exams:     PHYSICAL EXAM:     GENERAL: vital signs recorded - well developed, well nourished;  no apparent distress;     EYES: extraocular movements intact; conjunctiva and cornea are normal; PERRLA;     E/N/T: EARS:  normal external auditory canals and tympanic membranes;  grossly normal hearing; NOSE: right maxillary sinus tenderness present;     NECK: range of motion is normal; thyroid is non-palpable;     RESPIRATORY: normal respiratory rate and pattern with no distress; normal breath sounds with no rales, rhonchi, wheezes or rubs;     CARDIOVASCULAR: normal rate; rhythm is regular;  a systolic murmur is noted: it is grade 4/6 and best heard in left upper sternal border;  no edema;     GASTROINTESTINAL: nontender; normal bowel sounds; no organomegaly;     BREAST/INTEGUMENT: SKIN: no significant rashes or lesions; no suspicious moles;     MUSCULOSKELETAL: Negative fist percussion bilaterally but SI TTP bilaterally; No CVA tenderness     PSYCHIATRIC:  appropriate affect and demeanor; normal speech pattern; grossly normal memory;         ASSESSMENT           599.0   N39.0  UTI              DDx:     V13.09   N39.0  History of recurrent UTI's              DDx:     250.00   E11.9  Type 2 diabetes              DDx:         ORDERS:         Meds Prescribed:       Refill of: Ciprofloxacin HCl 500mg Tablet Take 1 tablet(s) by mouth q12h for 10 days  #20 (Twenty) tablet(s) Refills: 0         Lab Orders:       [id number]  [facility] with [facility]  (Send-Out)         [id number]  [facility] Basic Metabolic Panel  (Send-Out)         [id number]  CBC [facility] CBC with 3 part diff  (Send-Out)         [id number]  A1CEG [facility] Hemoglobin A1C  (Send-Out)           Procedures Ordered:       REFER  Referral to Specialist or Other Facility   (Send-Out)                   PLAN:          UTICipro refilled as this worked well for recent UTI.     LABORATORY:  Labs ordered to be performed today include basic metabolic panel and CBC.      REFERRALS:  Referral initiated to a urologist ( JERONIMO Yates [facility]; Pt would prefer to stay or [place] or [facility] ).            Prescriptions:       Refill of: Ciprofloxacin HCl 500mg Tablet Take 1 tablet(s) by mouth q12h for 10 days  #20 (Twenty) tablet(s) Refills: 0           Orders:       [id number]  [facility] with [facility]  (Send-Out)         REFER  Referral to Specialist or Other Facility  (Send-Out)         [id number]  [facility] Basic Metabolic Panel  (Send-Out)         [id number]  [facility] diff  (Send-Out)            History of recurrent UTI's Pt has several UTIs this year, referred to urology.          Type 2 diabetes Fortunately A1c has trended down to pre-diabetic range. Will reorder to given labs will already be drawn to help ensure A1c is still normalizing.     LABORATORY:  Labs ordered to be performed today include HgbA1C.            Orders:       [id number]  A1C - [facility] Hemoglobin A1C  (Send-Out)               CHARGE CAPTURE           **Please note: ICD descriptions below are intended for billing purposes only and may not represent clinical diagnoses**        Primary Diagnosis:         599.0 UTI            N39.0    Urinary tract infection, site not specified              Orders:          [id number]   Office/outpatient visit; established patient, level 3  (In-House)           V13.09 History of recurrent UTI's            N39.0    Urinary tract infection, site not specified    250.00 Type 2 diabetes            E11.9    Type 2 diabetes mellitus without complications

## 2021-05-18 NOTE — PROGRESS NOTES
Zoila Whipple  1940     Office/Outpatient Visit    Visit Date: Thu, May 28, 2020 02:20 pm    Provider: Jewel Chavez MD (Assistant: Radha Walden MA)    Location: [facility]        Electronically signed by Jewel Chavez MD on  05/30/2020 08:52:18 AM                             Subjective:        CC: 'my feet', diabetes    HPI:       Zoila Dixon is being seen today for evaluation of her feet.  She has been dealing with this for the last 6 weeks.  She has some pain but that is not new.  She does take two 'fluid pills' a day in the form of furosemide.  She feels that her breathing is okay most of the time.  She does have times that it is not as good.          Additionally, she presents with history of type 2 diabetes mellitus without complications.  current meds include nothing.  She does not perform home blood glucose monitoring.  Most recent lab results include Hemoglobin A1c:  6.0 (%) (01/07/2020), LDL:  72 (mg/dL) (07/29/2019).            Additionally, she presents with history of other specified hypothyroidism.  she is currently taking Levothyroid, 100 mcg daily.  TSH was last checked 3 months ago.  The result was reported as high.  Currently, she is experiencing edema.            With regard to the essential (primary) hypertension, her current cardiac medication regimen includes a diuretic ( Lasix ), a beta-blocker ( Toprol-XL ), and an ACE inhibitor ( Zestril ).  She did not bring her blood pressure diary, but says that pressures have been too high.  She is tolerating the medication well without side effects.  Compliance with treatment has been good; she takes her medication as directed and follows up as directed.      ROS:     CONSTITUTIONAL:  Negative for chills and fever.      CARDIOVASCULAR:  Negative for chest pain and palpitations.      RESPIRATORY:  Negative for recent cough and dyspnea.      GASTROINTESTINAL:  Negative for abdominal pain, nausea and vomiting.       INTEGUMENTARY/BREAST:  Negative for atypical mole(s) and rash.          Past Medical History / Family History / Social History:         Last Reviewed on 2020 02:28 PM by Jewel Chavez    Past Medical History:                 PAST MEDICAL HISTORY         Hypertension    Aortic Valve Replacement - mechanical     Gastroesophageal Reflux Disease     Hypothyroidism         CURRENT MEDICAL PROVIDERS:    Cardiologist: Sky Bowman    Urologist: Dr. Jensen             ADVANCE DIRECTIVES: Living will - Her children would make decisions for her if needed.          PREVENTIVE HEALTH MAINTENANCE             BONE DENSITY: was last done 19 with the following abnormality noted-- Osteopenia     COLORECTAL CANCER SCREENING: Up to date (colonoscopy q10y; sigmoidoscopy q5y; Cologuard q3y) was last done 2014, Results are in chart; colonoscopy with the following abnormalities noted-- pelvic fixation - limited exam     EYE EXAM: Diabetic Eye Exam during this calendar year and results are in chart was last done 2019     MAMMOGRAM: Done within last 2 years and results in are chart was last done 19 with normal results         Surgical History:         Appendectomy: at age 24;     Hysterectomy: at age 24; complete;     Hernia Repair: right inguinal;     Laminectomy: lumbar region; 1975;     Aortic Valve Replacement;    Back Surgery;    Skin Cancer Removal; Procedures: colonoscopy /normal heart cath          Family History:         Positive for Myocardial Infarction ( father ).  Father:  at age 49; Cause of death was MI     Mother:  at age 94         Social History:     Occupation: Retired (Prior occupation: [employer])     Marital Status:       Children: 5 children         Tobacco/Alcohol/Supplements:     Last Reviewed on 2020 02:28 PM by Jewel Chavez    Tobacco: She has never smoked.          Alcohol:  Does not drink alcohol and never has.          Substance Abuse  History:     Last Reviewed on 5/28/2020 02:28 PM by Jewel Chavez    NEGATIVE         Mental Health History:     Last Reviewed on 5/28/2020 02:28 PM by Jewel Chavez        Communicable Diseases (eg STDs):     Last Reviewed on 5/28/2020 02:28 PM by Jewel Chavez        Current Problems:     Last Reviewed on 5/28/2020 02:28 PM by Jewel Chavez    Gastro-esophageal reflux disease without esophagitis    Presence of prosthetic heart valve    Other specified hypothyroidism    Essential (primary) hypertension    Long term (current) use of anticoagulants    Mixed hyperlipidemia    Type 2 diabetes mellitus without complications    Dysuria    Major depressive disorder, single episode, moderate    Urinary tract infection, site not specified    Syncope and collapse    Other fatigue    Impacted cerumen, right ear    Acute upper respiratory infection, unspecified    Pain in left arm    Pain in left hand    Laceration without foreign body of scalp, initial encounter    Diarrhea, unspecified    Pneumonia, unspecified organism    Right upper quadrant pain        Immunizations:     Havrix -adult dose (HepA) 6/18/2018    Havrix -adult dose (HepA) 12/19/2018    Prevnar 13 (Pneumococcal PCV 13) 9/21/2016    Flulaval 9/16/2011    Fluzone pf (3+ years dose) 10/8/2013    Fluzone High-Dose pf (>=65 yr) 10/13/2014    Fluzone High-Dose pf (>=65 yr) 10/26/2015    Fluzone High-Dose pf (>=65 yr) 9/21/2016    Fluzone High-Dose pf (>=65 yr) 9/21/2017    Fluzone High-Dose pf (>=65 yr) 10/5/2018    Fluzone High-Dose pf (>=65 yr) 9/30/2019    PNEUMOVAX 23 (Pneumococcal PPV23) 9/21/2017        Allergies:     Last Reviewed on 5/28/2020 02:28 PM by Jewel Chavez    medrol dose pack: Rash,chills  (Adverse Reaction)    Sulfonamides: Rash     Bactrim:      Niacin (Nicotinic Acid):          Current Medications:     Last Reviewed on 5/28/2020 02:28 PM by Jewel Chavez    Vitamin E 1,000 unit oral capsule  [1 capsule daily]    Endur-C with mary jo hips     lisinopriL 10 mg oral tablet [TAKE 1 TABLET EVERY DAY]    pantoprazole 40 mg oral tablet, delayed release (enteric coated) [TAKE 1 TABLET EVERY DAY]    Fish Oil 300-1,000 mg oral capsule [1 / day]    Accu-Chek Lana Plus Test Strips  Reagent Strips [check blood sugar once daily  DXE11.9]    Accu-Chek Softclix  Lancet [Check blood sugar 1 x per day. DX: E11.9]    Metoprolol Succinate 25 mg oral Tablet, Extended Release 24 hr [1 tab PO daily]    Cephalexin 250 mg oral capsule [1 capsule daily]    metFORMIN 500 mg oral Tablet, Extended Release 24 hr [Take 2 tablet(s) by mouth daily]    Kids Vitamin D3     CoQ10 daily     Uribel 118-10-40.8-36 mg oral capsule [take 1 bid prn ]    Furosemide 20 mg oral tablet [1 tab daily]    potassium chloride 10 mEq oral capsule, extended release [one BID]    Transderm-Scop 1 mg over 3 days Transdermal Patch,Transdermal 3 day [apply 1 patch by transdermal route to the hairless area behind 1 ear at least 4 hr before effect is required; reapply every 3 days as needed]    nebulizers kit  [use tid with albuterol DX J18.9]    albuterol sulfate 2.5 mg /3 mL (0.083 %) Inhalation Solution for Nebulization [inhale 3 milliliters (2.5 mg) by nebulization route 3 times per day Dx J18.9]    levothyroxine 100 mcg oral tablet [take 1 tablet (100 mcg) by oral route once daily]        Objective:        Vitals:         Current: 5/28/2020 2:23:56 PM    Ht:  5 ft, 6 in;  Wt: 178.4 lbs;  BMI: 28.8T: 97.9 F (oral);  BP: 150/62 mm Hg (left arm, sitting);  sCr: 0.8 mg/dL;  GFR: 60.96        Exams:     PHYSICAL EXAM:     GENERAL: vital signs recorded - well developed, well nourished;  no apparent distress;     EYES: extraocular movements intact; conjunctiva and cornea are normal; PERRL;     NECK: range of motion is normal; thyroid is non-palpable;     RESPIRATORY: normal respiratory rate and pattern with no distress; normal breath sounds with no rales, rhonchi,  wheezes or rubs;     CARDIOVASCULAR: normal rate; rhythm is regular;  a systolic murmur is noted: it is grade 4/6;  1+ pedal edema;     GASTROINTESTINAL: nontender; normal bowel sounds; no organomegaly;     LYMPHATIC: no enlargement of cervical or facial nodes; no supraclavicular nodes;     BREAST/INTEGUMENT: SKIN: no significant rashes or lesions; no suspicious moles;     NEUROLOGIC: mental status: alert and oriented x 3; cranial nerves II-XII grossly intact;     PSYCHIATRIC: appropriate affect and demeanor; normal psychomotor function;     Foot exam performed.      Left foot exam    Protective sensation using Monofilament test: NORMAL sensation. Patient detects .07 grams of force which is considered normal.    Vascular status: normal peripheral vascular exam with palpable dorsal pedal and posterior tibal pulses and brisk digital capillary refill    Skin is intact without sores or ulcers    Right foot exam    Protective sensation using Monofilament test: NORMAL sensation. Patient detects .07 grams of force which is considered normal.    Vascular status: normal peripheral vascular exam with palpable dorsal pedal and posterior tibal pulses and brisk digital capillary refill    Skin is intact without sores or ulcers         Assessment:         R60.0   Localized edema       E11.9   Type 2 diabetes mellitus without complications       E03.8   Other specified hypothyroidism       I10   Essential (primary) hypertension           ORDERS:         Radiology/Test Orders:       3017F  Colorectal CA screen results documented and reviewed (PV)  (In-House)            2022F  Dilated retinal eye exam w/interpret by ophthalmologist/optometrist documented/reviewed (DM)4  (In-House)              Lab Orders:       [id number]  DIAB2 - [facility] CMP A1C LIPID AND MICRO ALBUM CR RATIO: [id number]  (Send-Out)            57161  TSH - [facility] TSH  (Send-Out)            84795  BDUAM - [facility] Urinalysis, automated, with micro  (Send-Out)               Other Orders:       2028F  Foot examination performed (includes examination through visual inspection, sensory exam with monofilament, and pulse exam - report when any of the three components are completed) (DM)4  (In-House)            1100F  Pt screen for fall risk; document 2+ falls in the past yr or any fall w/injury in past year (FREDIS)  (In-House)              Screening mammogram results documented  (Send-Out)            1123F  Advance Care Planning discussed and doc; advance care plan or surrogate decision maker doc. in MR  (Send-Out)                      Plan:         Localized edema        RECOMMENDATIONS given include: She does have some swelling of the lower legs.  I suspect it is a combination of factors and hope that it will be short-lived.  We will update some blood work on her as noted below.  No other near term changes are anticipated..  MIPS Has fallen 2+ times in the past year or had one fall with an injury in the past year Vaccines Flu and Pneumonia updated in Shot record Screening mammomgram done within last 2 years and results in are chart Colorectal Cancer Screening is up to date and the results are in the chart Diabetic Eye Exam during this calendar year and results are in chart ACP discussion: Advance Directive/Surrogate Decision Maker discussed and scanned into chart           Orders:       1100F  Pt screen for fall risk; document 2+ falls in the past yr or any fall w/injury in past year (FREDIS)  (In-House)              Screening mammogram results documented  (Send-Out)            3017F  Colorectal CA screen results documented and reviewed (PV)  (In-House)            2022F  Dilated retinal eye exam w/interpret by ophthalmologist/optometrist documented/reviewed (DM)4  (In-House)            1123F  Advance Care Planning discussed and doc; advance care plan or surrogate decision maker doc. in MR  (Send-Out)              Type 2 diabetes mellitus without complications    LABORATORY:   Labs ordered to be performed today include Diabetes Panel 2;CMP, A1C, Lipid, Microalbumin:Creatinine Ratio and urinalysis with micro.            Orders:       2028F  Foot examination performed (includes examination through visual inspection, sensory exam with monofilament, and pulse exam - report when any of the three components are completed) (DM)4  (In-House)            91312  DIAB2 - [facility] CMP A1C LIPID AND MICRO ALBUM CR RATIO: [id number]  (Send-Out)            44085  BDUAM - [facility] Urinalysis, automated, with micro  (Send-Out)              Other specified hypothyroidism    LABORATORY:  Labs ordered to be performed today include TSH.            Orders:       12822  TSH - [facility] TSH  (Send-Out)              Essential (primary) hypertensionAs above.            Charge Capture:         Primary Diagnosis:     R60.0  Localized edema           Orders:      52345  Office/outpatient visit; established patient, level 4  (In-House)            1100F  Pt screen for fall risk; document 2+ falls in the past yr or any fall w/injury in past year (FREDIS)  (In-House)            3017F  Colorectal CA screen results documented and reviewed (PV)  (In-House)            2022F  Dilated retinal eye exam w/interpret by ophthalmologist/optometrist documented/reviewed (DM)4  (In-House)              E11.9  Type 2 diabetes mellitus without complications           Orders:      2028F  Foot examination performed (includes examination through visual inspection, sensory exam with monofilament, and pulse exam - report when any of the three components are completed) (DM)4  (In-House)              E03.8  Other specified hypothyroidism     I10  Essential (primary) hypertension

## 2021-05-18 NOTE — PROGRESS NOTES
Zoila WhippleJourdan 1940     Office/Outpatient Visit    Visit Date: Thu, Feb 15, 2018 10:43 am    Provider: Jewel Chavez MD (Assistant: Niki Adorno MA)    Location: [facility]        Electronically signed by Jewel Chavez MD on  02/16/2018 07:58:07 AM                             SUBJECTIVE:        CC: depression         HPI:     Zoila Dixon is in today for follow up on what may be depression.  Her  passed away in October.  It was a shock.  She has been dealing with some other family issues as well.  She continues to have very significant sadness and feeling down.  She denies suicidal thoughts.  She denies guilt.  Her energy level is 'not like it should be'.  She lacks motivation to do much.  She has some sleep difficulty as well.  It is hard for her to get into a regular sleep pattern.     ROS:     CONSTITUTIONAL:  Negative for chills and fever.      CARDIOVASCULAR:  Negative for chest pain and palpitations.      RESPIRATORY:  Negative for recent cough and dyspnea.      GASTROINTESTINAL:  Negative for abdominal pain, nausea and vomiting.      INTEGUMENTARY/BREAST:  Negative for atypical mole(s) and rash.          PMH/FMH/SH:     Last Reviewed on 2/15/2018 10:54 AM by Jewel Chavez    Past Medical History:                 PAST MEDICAL HISTORY         Hypertension    Aortic Valve Replacement - mechanical     Gastroesophageal Reflux Disease     Hypothyroidism         CURRENT MEDICAL PROVIDERS:    Cardiologist: Sky Bowman    Urologist: Dr. Jensen         PREVENTIVE HEALTH MAINTENANCE             COLORECTAL CANCER SCREENING: Up to date (colonoscopy q10y; sigmoidoscopy q5y; Cologuard q3y) was last done 04/2014, Results are in chart; colonoscopy with the following abnormalities noted-- pelvic fixation - limited exam     MAMMOGRAM: Done within last 2 years and results in are chart was last done 06/2017 with normal results         Surgical History:         Appendectomy: at age 24;      Hysterectomy: at age 24; complete;      Hernia Repair: right inguinal;     Laminectomy: lumbar region; 1975;     Aortic Valve Replacement;    Back Surgery;    Skin Cancer Removal; Procedures: colonoscopy /normal heart cath          Family History:         Positive for Myocardial Infarction ( father ).  Father:  at age 49; Cause of death was MI     Mother:  at age 94         Social History:     Occupation: Retired (Prior occupation: [employer])     Marital Status:      Children: 5 children         Tobacco/Alcohol/Supplements:     Last Reviewed on 2/15/2018 10:54 AM by Jewel Chavez    Tobacco: She has never smoked.          Alcohol:  Does not drink alcohol and never has.          Substance Abuse History:     Last Reviewed on 2/15/2018 10:54 AM by Jewel Chavez    NEGATIVE         Mental Health History:     Last Reviewed on 2/15/2018 10:54 AM by Jewel Chavez        Communicable Diseases (eg STDs):     Last Reviewed on 2/15/2018 10:54 AM by Jewel Chavez            Current Problems:     Last Reviewed on 2/15/2018 10:54 AM by Jewel Chavez    Type 2 diabetes     Hypercholesterolemia     Use of high risk medications     Hypertension     Acquired hypothyroidism     Heart valve replacement patient     GERD     Dysuria         Immunizations:     Prevnar 13 (Pneumococcal PCV 13) 2016     Flulaval 2011     Fluzone pf (3+ years dose) 10/8/2013     Fluzone High-Dose pf (>=65 yr) 10/13/2014     Fluzone High-Dose pf (>=65 yr) 10/26/2015     Fluzone High-Dose pf (>=65 yr) 2016     Fluzone High-Dose pf (>=65 yr) 2017     PNEUMOVAX 23 (Pneumococcal PPV23) 2017         Allergies:     Last Reviewed on 2/15/2018 10:54 AM by Jewel Chavez    Sulfonamides: rash    Niacin (Nicotinic Acid):        Current Medications:     Last Reviewed on 2/15/2018 10:54 AM by Jewel Chavez    Pantoprazole 40mg Tablets, Delayed Release Take 1  tablet(s) by mouth daily     Synthroid 0.075mg Tablet Take 1 tablet(s) by mouth daily     Metformin HCl 500mg Tablets, Extended Release 2 po daily     Accu-Chek Lana Plus Test Strips  Reagent Strips check blood sugar once daily  DXE11.9     Accu-Chek Softclix  Lancet Check blood sugar 1 x per day. DX: E11.9     Metoprolol Succinate 25mg Tablets, Extended Release     Atorvastatin Calcium 40mg Tablet Take 1 tablet(s) by mouth daily     Potassium Chloride 20mEq Tablets, Extended Release Take 1 tablet(s) by mouth bid         OBJECTIVE:        Vitals:         Current: 2/15/2018 10:45:25 AM    Ht:  5 ft, 6 in;  Wt: 161.7 lbs;  BMI: 26.1    T: 97.5 F (oral);  BP: 129/68 mm Hg (left arm, sitting);  P: 83 bpm (left arm (BP Cuff), sitting);  sCr: 0.65 mg/dL;  GFR: 74.30        Exams:     PHYSICAL EXAM:     GENERAL: vital signs recorded - well developed, well nourished;  no apparent distress;     NECK: range of motion is normal; thyroid is non-palpable;     RESPIRATORY: normal respiratory rate and pattern with no distress; normal breath sounds with no rales, rhonchi, wheezes or rubs;     CARDIOVASCULAR: normal rate; rhythm is regular;  no systolic murmur; no edema;     GASTROINTESTINAL: nontender; normal bowel sounds; no organomegaly;     BREAST/INTEGUMENT: SKIN: no significant rashes or lesions; no suspicious moles;     PSYCHIATRIC: affect/demeanor: depressed;         ASSESSMENT           296.22   F32.1  Moderate depression              DDx:         ORDERS:         Meds Prescribed:       Lexapro (Escitalopram Oxalate) 10mg Tablet Take 1 tablet(s) by mouth daily  #30 (Thirty) tablet(s) Refills: 5       Trazodone HCl 50mg Tablet Take 1/2 to 1 tablet(s) by mouth at bedtime  #30 (Thirty) tablet(s) Refills: 0                 PLAN:          Moderate depression         RECOMMENDATIONS given include: She is clinically depressed.  I'm going to recommend treatment as noted below for her.  We will also give her something to help with  sleep for the near term.  No other near term changes are anticipated..            Prescriptions:       Lexapro (Escitalopram Oxalate) 10mg Tablet Take 1 tablet(s) by mouth daily  #30 (Thirty) tablet(s) Refills: 5       Trazodone HCl 50mg Tablet Take 1/2 to 1 tablet(s) by mouth at bedtime  #30 (Thirty) tablet(s) Refills: 0           Patient Education Handouts:       Depression (Depressive Disorder)              CHARGE CAPTURE           **Please note: ICD descriptions below are intended for billing purposes only and may not represent clinical diagnoses**        Primary Diagnosis:         296.22 Moderate depression            F32.1    Major depressive disorder, single episode, moderate              Orders:          02378   Office/outpatient visit; established patient, level 3  (In-House)

## 2021-05-18 NOTE — PROGRESS NOTES
Zoila Whipple 1940     Office/Outpatient Visit    Visit Date: Tue, Jul 9, 2019 01:39 pm    Provider: Jewel Chavez MD (Assistant: Kira Mccollum MA)    Location: [facility]        Electronically signed by Jewel Chavez MD on  07/10/2019 08:01:03 AM                             SUBJECTIVE:        CC: blood pressure         HPI:         Zoila Dixon presents with hypertension.  Her current cardiac medication regimen includes a diuretic ( Lasix ) and a beta-blocker ( Toprol-XL ).  She did not bring her blood pressure diary, but says that pressures have been too high.  She is tolerating the medication well without side effects.  Compliance with treatment has been good; she takes her medication as directed and follows up as directed.          Dx with hypercholesterolemia; current treatment includes a low cholesterol/low fat diet.  Compliance with treatment has been good; she maintains her low cholesterol diet and follows up as directed.      ROS:     CONSTITUTIONAL:  Negative for chills and fever.      CARDIOVASCULAR:  Negative for chest pain and palpitations.      RESPIRATORY:  Negative for recent cough and dyspnea.      GASTROINTESTINAL:  Negative for abdominal pain, nausea and vomiting.          PMH/FMH/SH:     Last Reviewed on 7/01/2019 12:00 PM by Jewel Chavez    Past Medical History:                 PAST MEDICAL HISTORY         Hypertension    Aortic Valve Replacement - mechanical     Gastroesophageal Reflux Disease     Hypothyroidism         CURRENT MEDICAL PROVIDERS:    Cardiologist: Sky Bowman    Urologist: Dr. Jensen             ADVANCE DIRECTIVES: Living will - Her children would make decisions for her if needed.          PREVENTIVE HEALTH MAINTENANCE             BONE DENSITY: was last done 6/27/19 with the following abnormality noted-- Osteopenia     COLORECTAL CANCER SCREENING: Up to date (colonoscopy q10y; sigmoidoscopy q5y; Cologuard q3y) was last done 04/2014, Results are  in chart; colonoscopy with the following abnormalities noted-- pelvic fixation - limited exam     EYE EXAM: Diabetic Eye Exam during this calendar year and results are in chart was last done 2018     MAMMOGRAM: Done within last 2 years and results in are chart was last done 19 with normal results         Surgical History:         Appendectomy: at age 24;     Hysterectomy: at age 24; complete;      Hernia Repair: right inguinal;     Laminectomy: lumbar region; 1975;     Aortic Valve Replacement;    Back Surgery;    Skin Cancer Removal; Procedures: colonoscopy /normal heart cath          Family History:         Positive for Myocardial Infarction ( father ).  Father:  at age 49; Cause of death was MI     Mother:  at age 94         Social History:     Occupation: Retired (Prior occupation: [employer])     Marital Status:       Children: 5 children         Tobacco/Alcohol/Supplements:     Last Reviewed on 2019 12:00 PM by Jewel Chavez    Tobacco: She has never smoked.          Alcohol:  Does not drink alcohol and never has.          Substance Abuse History:     Last Reviewed on 2019 12:00 PM by Jewel Chavez    NEGATIVE         Mental Health History:     Last Reviewed on 2019 12:00 PM by Jewel Chavez        Communicable Diseases (eg STDs):     Last Reviewed on 2019 12:00 PM by Jewel Chavez            Current Problems:     Last Reviewed on 2019 12:00 PM by Jewel Chavez    Malaise and Fatigue     Near-syncope     History of recurrent UTI's     Moderate depression     Type 2 diabetes     Hypercholesterolemia     Use of high risk medications     Hypertension     Acquired hypothyroidism     Heart valve replacement patient     GERD     Elevated blood pressure without a diagnosis of hypertension     Dysuria         Immunizations:     Havrix -adult dose (HepA) 2018     Havrix -adult dose (HepA) 2018      Prevnar 13 (Pneumococcal PCV 13) 9/21/2016     Flulaval 9/16/2011     Fluzone pf (3+ years dose) 10/8/2013     Fluzone High-Dose pf (>=65 yr) 10/13/2014     Fluzone High-Dose pf (>=65 yr) 10/26/2015     Fluzone High-Dose pf (>=65 yr) 9/21/2016     Fluzone High-Dose pf (>=65 yr) 9/21/2017     Fluzone High-Dose pf (>=65 yr) 10/5/2018     PNEUMOVAX 23 (Pneumococcal PPV23) 9/21/2017         Allergies:     Last Reviewed on 7/01/2019 12:00 PM by Jewel Chavez    Sulfonamides: rash    Bactrim:    Niacin (Nicotinic Acid):        Current Medications:     Last Reviewed on 7/01/2019 12:00 PM by Jewel Chavez    Metformin HCl 500mg Tablets, Extended Release Take 2 tablet(s) by mouth daily     Pantoprazole 40mg Tablets, Delayed Release Take 1 tablet(s) by mouth daily     Synthroid 0.075mg Tablet Take 1 tablet(s) by mouth daily     Accu-Chek Lana Plus Test Strips  Reagent Strips check blood sugar once daily  DXE11.9     Accu-Chek Softclix  Lancet Check blood sugar 1 x per day. DX: E11.9     Potassium Chloride 20mEq Tablets, Extended Release Take 1 tablet(s) by mouth bid     Cephalexin 250mg Capsules 1 capsule daily     Furosemide 20mg Tablets 1 tab daily     hemp oil 2500mg daily     Uribel Capsules take 1 bid prn     CoQ10 daily     Fish Oil 1,000mg Softgel capsule 1 / day     Vitamin C     Vitamin D3     Vitamin E 1,000IU Capsules 1 capsule daily     Metoprolol Succinate 25mg Tablets, Extended Release 1 tab PO daily     Atorvastatin Calcium 20mg Tablet 1 tab daily         OBJECTIVE:        Vitals:         Current: 7/9/2019 1:46:55 PM    Ht:  5 ft, 6 in;  Wt: 171.4 lbs;  BMI: 27.7    T: 98.3 F (oral);  BP: 146/53 mm Hg (left arm, sitting);  P: 68 bpm (left arm (BP Cuff), sitting);  sCr: 0.78 mg/dL;  GFR: 62.47        Exams:     PHYSICAL EXAM:     GENERAL: vital signs recorded - well developed, well nourished;  no apparent distress;     NECK: range of motion is normal; thyroid is non-palpable;     RESPIRATORY:  normal respiratory rate and pattern with no distress; normal breath sounds with no rales, rhonchi, wheezes or rubs;     CARDIOVASCULAR: normal rate; rhythm is regular;  no systolic murmur; no edema;     GASTROINTESTINAL: nontender; normal bowel sounds; no organomegaly;     LYMPHATIC: no enlargement of cervical or facial nodes; no supraclavicular nodes;     BREAST/INTEGUMENT: SKIN: no significant rashes or lesions; no suspicious moles;         ASSESSMENT           401.1   I10  Hypertension              DDx:     272.0   E78.2  Hypercholesterolemia              DDx:         ORDERS:         Meds Prescribed:       Lisinopril 10mg Tablet Take 1 tablet(s) by mouth daily  #30 (Thirty) tablet(s) Refills: 2                 PLAN:          Hypertension         RECOMMENDATIONS given include: Today, we have reviewed her care.  With the diabetes and elevated blood pressure, I do think adding back an ACE inhibitor is reasonable.  We will resume lisinopril as noted below.  I have asked her to use our free check on 7/27 and 8/24.  We will consider statin again if she tolerates this okay..            Prescriptions:       Lisinopril 10mg Tablet Take 1 tablet(s) by mouth daily  #30 (Thirty) tablet(s) Refills: 2 cough, angioedema           Patient Education Handouts:       High Blood Pressure (HTN)           Hypercholesterolemia As above.             CHARGE CAPTURE           **Please note: ICD descriptions below are intended for billing purposes only and may not represent clinical diagnoses**        Primary Diagnosis:         401.1 Hypertension            I10    Essential (primary) hypertension              Orders:          84264   Office/outpatient visit; established patient, level 3  (In-House)           272.0 Hypercholesterolemia            E78.2    Mixed hyperlipidemia        ADDENDUMS:      ____________________________________    Addendum: 07/10/2019 09:35 AM - Four, Team         Visit Note Faxed to:        Sky Bowman   (Cardiology); Number [phone]

## 2021-05-18 NOTE — PROGRESS NOTES
Zoila Whipple 1940     Office/Outpatient Visit    Visit Date: Mon, Jan 22, 2018 09:58 am    Provider: Jewel Chavez MD (Assistant: Aziza Whipple MA)    Location: [facility]        Electronically signed by Jewel Chavez MD on  01/23/2018 06:44:21 AM                             SUBJECTIVE:        CC: facial numbness     Zoila Dixon is a 77 year old White female.  dysuria;         HPI:     Zoila Dixon is in today for evaluation of some visual disturbance and facial numbness.  She has had 6-7 recent episodes of blurred vision.  She says that these episodes have lasted just a few seconds and then resolved.  She has had some associated numbness and tingling of her lips with this.  This involved the whole mouth.  She has felt like she might pass out with this.  She does have a somewhat complicated history due to previous aortic valve replacement.  She currently has a porcine valve and is only on aspirin to thin her blood.  She does have thyroid issue and is technically diabetic as well.     ROS:     CONSTITUTIONAL:  Negative for chills and fever.      CARDIOVASCULAR:  Negative for chest pain and palpitations.      RESPIRATORY:  Negative for recent cough and dyspnea.      GASTROINTESTINAL:  Negative for abdominal pain, nausea and vomiting.      INTEGUMENTARY/BREAST:  Negative for atypical mole(s) and rash.          PMH/FMH/SH:     Last Reviewed on 5/23/2017 10:52 AM by Jewel Chavez    Past Medical History:                 PAST MEDICAL HISTORY         Hypertension    Aortic Valve Replacement - mechanical     Gastroesophageal Reflux Disease     Hypothyroidism         CURRENT MEDICAL PROVIDERS:    Cardiologist: Sky Bowman    Urologist: Dr. Jensen         PREVENTIVE HEALTH MAINTENANCE             COLORECTAL CANCER SCREENING: Up to date (colonoscopy q10y; sigmoidoscopy q5y; Cologuard q3y) was last done 04/2014, Results are in chart; colonoscopy with the following abnormalities noted-- pelvic  fixation - limited exam     MAMMOGRAM: Done within last 2 years and results in are chart was last done 2017 with normal results         Surgical History:         Appendectomy: at age 24;     Hysterectomy: at age 24; complete;      Hernia Repair: right inguinal;     Laminectomy: lumbar region; 1975;     Aortic Valve Replacement;    Back Surgery;    Skin Cancer Removal; Procedures: colonoscopy /normal heart cath          Family History:         Positive for Myocardial Infarction ( father ).  Father:  at age 49; Cause of death was MI     Mother:  at age 94         Social History:     Occupation: Retired (Prior occupation: [employer])     Marital Status:      Children: 5 children         Tobacco/Alcohol/Supplements:     Last Reviewed on 2017 10:52 AM by Jewel Chavez    Tobacco: She has never smoked.          Alcohol:  Does not drink alcohol and never has.          Substance Abuse History:     Last Reviewed on 2017 10:52 AM by Jewel Chavez    NEGATIVE         Mental Health History:     Last Reviewed on 2017 10:52 AM by Jewel Chavez        Communicable Diseases (eg STDs):     Last Reviewed on 2017 10:52 AM by Jewel Chavez            Current Problems:     Last Reviewed on 2017 10:52 AM by Jewel Chavez    Fatigue     Type 2 diabetes     Hypercholesterolemia     Use of high risk medications     Hypertension     Acquired hypothyroidism     Heart valve replacement patient     GERD         Immunizations:     Prevnar 13 (Pneumococcal PCV 13) 2016     Flulaval 2011     Fluzone pf (3+ years dose) 10/8/2013     Fluzone High-Dose pf (>=65 yr) 10/13/2014     Fluzone High-Dose pf (>=65 yr) 10/26/2015     Fluzone High-Dose pf (>=65 yr) 2016     Fluzone High-Dose pf (>=65 yr) 2017     PNEUMOVAX 23 (Pneumococcal PPV23) 2017         Allergies:     Last Reviewed on 2017 10:52 AM by Jewel Chavez     Sulfonamides: rash    Niacin (Nicotinic Acid):        Current Medications:     Last Reviewed on 5/23/2017 10:52 AM by Jewel Chavez    Pantoprazole 40mg Tablets, Delayed Release Take 1 tablet(s) by mouth daily     Synthroid 0.075mg Tablet Take 1 tablet(s) by mouth daily     Metformin HCl 500mg Tablets, Extended Release 2 po daily     Accu-Chek Lana Plus Test Strips  Reagent Strips check blood sugar once daily  DXE11.9     Accu-Chek Softclix  Lancet Check blood sugar 1 x per day. DX: E11.9     Metoprolol Succinate 25mg Tablets, Extended Release     Atorvastatin Calcium 40mg Tablet Take 1 tablet(s) by mouth daily     Potassium Chloride 20mEq Tablets, Extended Release Take 1 tablet(s) by mouth bid         OBJECTIVE:        Vitals:         Current: 1/22/2018 10:00:50 AM    Ht:  5 ft, 6 in;  Wt: 161 lbs;  BMI: 26.0    T: 97.6 F (oral);  BP: 134/63 mm Hg (left arm, sitting);  P: 76 bpm (left arm (BP Cuff), sitting);  sCr: 0.73 mg/dL;  GFR: 66.03        Exams:     PHYSICAL EXAM:     GENERAL: vital signs recorded - well developed, well nourished;  no apparent distress;     EYES: extraocular movements intact; conjunctiva and cornea are normal; PERRLA;     E/N/T:  normal EACs, TMs, nasal/oral mucosa, teeth, gingiva, and oropharynx;     NECK: range of motion is normal; thyroid is non-palpable; carotid exam reveals left bruit;     RESPIRATORY: normal respiratory rate and pattern with no distress; normal breath sounds with no rales, rhonchi, wheezes or rubs;     CARDIOVASCULAR: normal rate; rhythm is regular;  no systolic murmur; no edema;     GASTROINTESTINAL: nontender; normal bowel sounds; no organomegaly;     BREAST/INTEGUMENT: SKIN: no significant rashes or lesions; no suspicious moles;     NEUROLOGIC: mental status: alert and oriented x 3; cranial nerves II-XII grossly intact;         Lab/Test Results:         LABORATORY RESULTS: EKG performed by ph         ASSESSMENT           784.0   R20.2  Facial numbness               DDx:     250.00   E11.9  Type 2 diabetes              DDx:     244.8   E03.8  Acquired hypothyroidism              DDx:     272.0   E78.4  Hypercholesterolemia              DDx:     780.79   R53.83  Malaise and Fatigue              DDx:     788.1   R30.0  Dysuria              DDx:     368.8   H53.8  Blurred vision              DDx:     780.2   R55  Near-syncope              DDx:         ORDERS:         Radiology/Test Orders:       [id number]  Electrocardiogram, routine with at least 12 leads; with interpretation and report  (In-House)         13890  Holter monitor connection and disconnection  (In-House)  (PT HOOKED UP FOR A 24 HOUR HOLTER 11:08 ON 1-22-18/)         Lab Orders:       [id number]  BDUAM - [facility] Urinalysis, automated, with micro  (Send-Out)         53486  DIAB2 - [facility] CMP A1C LIPID AND MICRO ALBUM CR RATIO: [id number]  (Send-Out)         66696  TSH - [facility] TSH  (Send-Out)         25772  BDCB2 - [facility] CBC w/o diff  (Send-Out)                   PLAN:          Facial numbness         RECOMMENDATIONS given include: Today, we will evaluate Zoila Dixon as noted below.  I want to rule out obvious irregular heart beat and carotid disease.  We will likely refer her back to cardiology for their input.  She may need follow up echo as a caution.  Her symptoms are not necessarily classic for TIA, but that is a concern to some degree.  Consider MRI, but her exam is normal..            Patient Education Handouts:       [facility] Medication Compliance           Type 2 diabetes     LABORATORY:  Labs ordered to be performed today include Diabetes Panel 2;CMP, A1C, Lipid, Microalbumin:Creatinine Ratio.            Orders:       [id number]  DIAB2 - [facility] CMP A1C LIPID AND MICRO ALBUM CR RATIO: [id number]  (Send-Out)            Acquired hypothyroidism     LABORATORY:  Labs ordered to be performed today include TSH.            Orders:       [id number]  TSH - [facility] TSH  (Send-Out)            Hypercholesterolemia As  above.          Malaise and Fatigue     LABORATORY:  Labs ordered to be performed today include CBC W/O DIFF.            Orders:       [id number]  BDCB2 - [facility] CBC w/o diff  (Send-Out)            Dysuria           Orders:       66287  BDUAM - [facility] Urinalysis, automated, with micro  (Send-Out)            Blurred vision As above.          Near-syncope         TESTS/PROCEDURES:  Will proceed with an ECG and Holter Monitor 24 hour to be performed/scheduled now.            Orders:       [id number]  Electrocardiogram, routine with at least 12 leads; with interpretation and report  (In-House)         87087  Holter monitor connection and disconnection  (In-House)  (PT HOOKED UP FOR A 24 HOUR HOLTER 11:08 ON 1-22-18/PH)             CHARGE CAPTURE           **Please note: ICD descriptions below are intended for billing purposes only and may not represent clinical diagnoses**        Primary Diagnosis:         784.0 Facial numbness            R20.2    Paresthesia of skin              Orders:          [id number]   Office/outpatient visit; established patient, level 4  (In-House)           250.00 Type 2 diabetes            E11.9    Type 2 diabetes mellitus without complications    244.8 Acquired hypothyroidism            E03.8    Other specified hypothyroidism    272.0 Hypercholesterolemia            E78.4    Other hyperlipidemia    780.79 Malaise and Fatigue            R53.83    Other fatigue    788.1 Dysuria            R30.0    Dysuria    368.8 Blurred vision            H53.8    Other visual disturbances    780.2 Near-syncope            R55    Syncope and collapse              Orders:          [id number]   Electrocardiogram, routine with at least 12 leads; with interpretation and report  (In-House)             90083   Holter monitor connection and disconnection  (In-House)  (PT HOOKED UP FOR A 24 HOUR HOLTER 11:08 ON 1-22-18/PH)             ADDENDUMS:      ____________________________________    Addendum: 01/23/2018 11:29 AM - Letha Yeboah         Holter returned and uploaded to [facility]jam        Addendum: 01/26/2018 10:18 AM - Abigail Atkins         Visit Note Faxed to:        Homero Lopez  (Cardiology); Number [phone]     Health Summary Faxed to:        Homero Lopez  (Cardiology); Number [phone]

## 2021-05-18 NOTE — PROGRESS NOTES
Zoila Whipple  1940     Office/Outpatient Visit    Visit Date: Mon, Feb 17, 2020 11:14 am    Provider: Jewel Chavez MD (Assistant: Breann Chandra RN)    Location: [facility]        Electronically signed by Jewel Chavez MD on  02/17/2020 05:12:24 PM                             Subjective:        CC: pneumonia follow up    HPI:       Zoila Dixon is in today for follow up on pneumonia.  She was seen for this about 2 weeks ago and was covered with Augmentin and doxycycline.  She says that she is not better.  She continues to cough.  She thought she might have had some fever last night.  She also feels short of breath with exertion.  She does not feel that she has gotten over this as of yet.  She is not sure how to move forward at this time.  X-ray did show a lingular pneumonia.    ROS:     CONSTITUTIONAL:  Positive for chills.   Negative for fever.      E/N/T:  Positive for nasal congestion, frequent rhinorrhea ( old yucky yellow stuff ) and sinus pressure.   Negative for diminished hearing.      CARDIOVASCULAR:  Negative for chest pain and palpitations.      RESPIRATORY:  Positive for recent cough and dyspnea.      GASTROINTESTINAL:  Negative for abdominal pain, nausea and vomiting.      INTEGUMENTARY/BREAST:  Negative for atypical mole(s) and rash.          Past Medical History / Family History / Social History:         Last Reviewed on 2/17/2020 11:46 AM by Jewel Chavez    Past Medical History:                 PAST MEDICAL HISTORY         Hypertension    Aortic Valve Replacement - mechanical     Gastroesophageal Reflux Disease     Hypothyroidism         CURRENT MEDICAL PROVIDERS:    Cardiologist: Sky Bowman    Urologist: Dr. Jensen             ADVANCE DIRECTIVES: Living will - Her children would make decisions for her if needed.          PREVENTIVE HEALTH MAINTENANCE             BONE DENSITY: was last done 6/27/19 with the following abnormality noted-- Osteopenia      COLORECTAL CANCER SCREENING: Up to date (colonoscopy q10y; sigmoidoscopy q5y; Cologuard q3y) was last done 2014, Results are in chart; colonoscopy with the following abnormalities noted-- pelvic fixation - limited exam     EYE EXAM: Diabetic Eye Exam during this calendar year and results are in chart was last done 2019     MAMMOGRAM: Done within last 2 years and results in are chart was last done 19 with normal results         Surgical History:         Appendectomy: at age 24;     Hysterectomy: at age 24; complete;     Hernia Repair: right inguinal;     Laminectomy: lumbar region; 1975;     Aortic Valve Replacement;    Back Surgery;    Skin Cancer Removal; Procedures: colonoscopy /normal heart cath          Family History:         Positive for Myocardial Infarction ( father ).  Father:  at age 49; Cause of death was MI     Mother:  at age 94         Social History:     Occupation: Retired (Prior occupation: [employer])     Marital Status:       Children: 5 children         Tobacco/Alcohol/Supplements:     Last Reviewed on 2020 11:46 AM by Jewel Chavez    Tobacco: She has never smoked.          Alcohol:  Does not drink alcohol and never has.          Substance Abuse History:     Last Reviewed on 2020 11:46 AM by Jewel Chavez    NEGATIVE         Mental Health History:     Last Reviewed on 2020 11:46 AM by Jewel Chavez        Communicable Diseases (eg STDs):     Last Reviewed on 2020 11:46 AM by Jewel Chavez        Current Problems:     Last Reviewed on 2020 11:46 AM by Jewel Chavez    Gastro-esophageal reflux disease without esophagitis    Presence of prosthetic heart valve    Other specified hypothyroidism    Essential (primary) hypertension    Long term (current) use of anticoagulants    Mixed hyperlipidemia    Type 2 diabetes mellitus without complications    Dysuria    Major depressive disorder,  single episode, moderate    Urinary tract infection, site not specified    Syncope and collapse    Other fatigue    Impacted cerumen, right ear    Acute upper respiratory infection, unspecified    Pain in left arm    Pain in left hand    Laceration without foreign body of scalp, initial encounter    Diarrhea, unspecified        Immunizations:     Havrix -adult dose (HepA) 6/18/2018    Havrix -adult dose (HepA) 12/19/2018    Prevnar 13 (Pneumococcal PCV 13) 9/21/2016    Flulaval 9/16/2011    Fluzone pf (3+ years dose) 10/8/2013    Fluzone High-Dose pf (>=65 yr) 10/13/2014    Fluzone High-Dose pf (>=65 yr) 10/26/2015    Fluzone High-Dose pf (>=65 yr) 9/21/2016    Fluzone High-Dose pf (>=65 yr) 9/21/2017    Fluzone High-Dose pf (>=65 yr) 10/5/2018    Fluzone High-Dose pf (>=65 yr) 9/30/2019    PNEUMOVAX 23 (Pneumococcal PPV23) 9/21/2017        Allergies:     Last Reviewed on 2/17/2020 11:46 AM by Jewel Chavez    medrol dose pack: Rash,chills  (Adverse Reaction)    Sulfonamides: Rash     Bactrim:      Niacin (Nicotinic Acid):          Current Medications:     Last Reviewed on 2/17/2020 11:46 AM by Jewel Chavez with mary jo hips     Vitamin E 1,000 unit oral capsule [1 capsule daily]    lisinopriL 10 mg oral tablet [Take 1 tablet(s) by mouth daily]    Synthroid 75 mcg oral tablet [Take 1 tablet(s) by mouth daily]    Pantoprazole 40 mg oral tablet, delayed release (enteric coated) [Take 1 tablet(s) by mouth daily]    Fish Oil 300-1,000 mg oral capsule [1 / day]    Accu-Chek Lana Plus Test Strips  Reagent Strips [check blood sugar once daily  DXE11.9]    Accu-Chek Softclix  Lancet [Check blood sugar 1 x per day. DX: E11.9]    Metoprolol Succinate 25 mg oral Tablet, Extended Release 24 hr [1 tab PO daily]    Cephalexin 250 mg oral capsule [1 capsule daily]    metFORMIN 500 mg oral Tablet, Extended Release 24 hr [Take 2 tablet(s) by mouth daily]    Kids Vitamin D3     CoQ10 daily     Uribel  "118-10-40.8-36 mg oral capsule [take 1 bid prn ]    Furosemide 20 mg oral tablet [1 tab daily]    potassium chloride 10 mEq oral capsule, extended release [one BID]    Transderm-Scop 1 mg over 3 days Transdermal Patch,Transdermal 3 day [apply 1 patch by transdermal route to the hairless area behind 1 ear at least 4 hr before effect is required; reapply every 3 days as needed]        Objective:        Vitals:         Current: 2/17/2020 11:22:16 AM    Ht:  5 ft, 6 in;  Wt: 170.8 lbs;  BMI: 27.6T: 98.5 F (oral);  BP: 120/60 mm Hg (left arm, sitting);  P: 72 bpm (finger clip, sitting);  sCr: 0.8 mg/dL;  GFR: 59.84O2 Sat: 96 % (room air)        Exams:     PHYSICAL EXAM:     GENERAL: vital signs recorded - well developed, well nourished;  no apparent distress, tired-appearing;     EYES: extraocular movements intact; conjunctiva and cornea are normal; PERRL;     E/N/T:  normal EACs, TMs, nasal/oral mucosa, teeth, gingiva, and oropharynx;     NECK: range of motion is normal; thyroid is non-palpable;     RESPIRATORY: normal respiratory rate and pattern with no distress; rales (\"crackles\") present in the left mid-lung field and LLL;     CARDIOVASCULAR: normal rate; rhythm is regular;  no systolic murmur; no edema;     GASTROINTESTINAL: nontender; normal bowel sounds; no organomegaly;     LYMPHATIC: no enlargement of cervical or facial nodes; no supraclavicular nodes;     BREAST/INTEGUMENT: SKIN: no significant rashes or lesions; no suspicious moles;         Procedures:     Pneumonia, unspecified organism    1. Kenalog 40 mg given IM in the right hip; administered by [facility];  lot number [id number]; expires 8/21             Assessment:         J18.9   Pneumonia, unspecified organism           ORDERS:         Meds Prescribed:       [New Rx] levoFLOXacin 750 mg oral tablet [take 1 tablet (750 mg) by oral route once daily], #7 (seven) tablets, Refills: 0 (zero)         Radiology/Test Orders:       [id number]  Radiologic exam chest 2 views  " (Send-Out)            3017F  Colorectal CA screen results documented and reviewed (PV)  (In-House)            2022F  Dilated retinal eye exam w/interpret by ophthalmologist/optometrist documented/reviewed (DM)4  (In-House)              Lab Orders:       [id number]  BDCB2 - [facility] CBC w/o diff  (Send-Out)            92527  SPUTC - [facility] Sputum Culture  (Send-Out)              Other Orders:       [id number]  Therapeutic injection  (In-House)            1100F  Pt screen for fall risk; document 2+ falls in the past yr or any fall w/injury in past year (FREDIS)  (In-House)              Screening mammogram results documented  (Send-Out)            1123F  Advance Care Planning discussed and doc; advance care plan or surrogate decision maker doc. in MR  (Send-Out)              Kenalog, per 10 mg  (x4)                  Plan:         Pneumonia, unspecified organism    LABORATORY:  Labs ordered to be performed today include CBC W/O DIFF and sputum culture.      RADIOLOGY:  I have ordered a chest x-ray (PA and lateral) to be done today.      RECOMMENDATIONS given include: We will cover Zoila Destiny as noted below.  I'm concerned about pneumonia.  We will evaluate this further as noted and be back in touch with her.  No other changes are anticipated.  We will follow closely..  Steroids Kenalog 40 mg 1 ml MIPS Has fallen 2+ times in the past year or had one fall with an injury in the past year Vaccines Flu and Pneumonia updated in Shot record Screening mammomgram done within last 2 years and results in are chart Colorectal Cancer Screening is up to date and the results are in the chart Diabetic Eye Exam during this calendar year and results are in chart ACP discussion: Advance Directive/Surrogate Decision Maker discussed and scanned into chart           Prescriptions:       [New Rx] levoFLOXacin 750 mg oral tablet [take 1 tablet (750 mg) by oral route once daily], #7 (seven) tablets, Refills: 0 (zero)           Orders:       [id number]   Therapeutic injection  (In-House)              Kenalog, per 10 mg  (x4)        35879  BDCB2 - [facility] CBC w/o diff  (Send-Out)            85245  SPUTC - [facility] Sputum Culture  (Send-Out)            35994  Radiologic exam chest 2 views  (Send-Out)            1100F  Pt screen for fall risk; document 2+ falls in the past yr or any fall w/injury in past year (FREDIS)  (In-House)              Screening mammogram results documented  (Send-Out)            3017F  Colorectal CA screen results documented and reviewed (PV)  (In-House)            2022F  Dilated retinal eye exam w/interpret by ophthalmologist/optometrist documented/reviewed (DM)4  (In-House)            1123F  Advance Care Planning discussed and doc; advance care plan or surrogate decision maker doc. in MR  (Send-Out)                Patient Education Handouts:       Pneumonia              Charge Capture:         Primary Diagnosis:     J18.9  Pneumonia, unspecified organism           Orders:      [id number]  Office/outpatient visit; established patient, level 4  (In-House)            27920  Therapeutic injection  (In-House)              Kenalog, per 10 mg  (x4)        1100F  Pt screen for fall risk; document 2+ falls in the past yr or any fall w/injury in past year (FREDIS)  (In-House)            3017F  Colorectal CA screen results documented and reviewed (PV)  (In-House)            2022F  Dilated retinal eye exam w/interpret by ophthalmologist/optometrist documented/reviewed (DM)4  (In-House)

## 2021-05-18 NOTE — PROGRESS NOTES
"Zoila Whipple DANICA  1940     Office/Outpatient Visit    Visit Date: Mon, Dec 2, 2019 02:32 pm    Provider: Smooth Alejandre MD (Assistant: Lotus Evans LPN)    Location: [facility]        Electronically signed by Smooth Alejandre MD on  12/02/2019 03:20:39 PM                             Subjective:        CC: Ear pain, right sided facial pain    HPI: Patient c/o of right sided ear pain with associated right sided facial pain that has been present for 3 days. She describes the pain as a \"pressure\" sensation. She notes that she is experiencing decreased hearing. No drainage. No fever or chills. No history of recurrent ear infections. No tinnitus. She is also endorsing some mild sinus/nasal congestion and rhinorrhea as well as nonproductive cough. No chest pain or shortness of breath. She has not taken  anything for her symptoms.    ROS:     CONSTITUTIONAL:  Negative for chills, fatigue and fever.      EYES:  Negative for blurred vision.      E/N/T:  Positive for ear pain ( right ) and diminished hearing ( right ).   Negative for tinnitus, nasal congestion, frequent rhinorrhea, hoarseness, sinus pressure or sore throat.      CARDIOVASCULAR:  Negative for chest pain, dizziness, palpitations and edema.      RESPIRATORY:  Negative for dyspnea and cough.      GASTROINTESTINAL:  Negative for diarrhea, nausea and vomiting.      MUSCULOSKELETAL:  Positive for right sided facial pain.      INTEGUMENTARY/BREAST:  Negative for rash.      NEUROLOGICAL:  Negative for headaches and weakness.          Past Medical History / Family History / Social History:         Last Reviewed on 12/02/2019 03:20 PM by Smooth Alejandre    Past Medical History:                 PAST MEDICAL HISTORY         Hypertension    Aortic Valve Replacement - mechanical     Gastroesophageal Reflux Disease     Hypothyroidism         CURRENT MEDICAL PROVIDERS:    Cardiologist: Sky Bowman    Urologist: Dr. Camila SAAVEDRA " DIRECTIVES: Living will - Her children would make decisions for her if needed.          PREVENTIVE HEALTH MAINTENANCE             BONE DENSITY: was last done 19 with the following abnormality noted-- Osteopenia     COLORECTAL CANCER SCREENING: Up to date (colonoscopy q10y; sigmoidoscopy q5y; Cologuard q3y) was last done 2014, Results are in chart; colonoscopy with the following abnormalities noted-- pelvic fixation - limited exam     EYE EXAM: Diabetic Eye Exam during this calendar year and results are in chart was last done 2018     MAMMOGRAM: Done within last 2 years and results in are chart was last done 19 with normal results         Surgical History:         Appendectomy: at age 24;     Hysterectomy: at age 24; complete;     Hernia Repair: right inguinal;     Laminectomy: lumbar region; 1975;     Aortic Valve Replacement;    Back Surgery;    Skin Cancer Removal; Procedures: colonoscopy /normal heart cath          Family History:         Positive for Myocardial Infarction ( father ).  Father:  at age 49; Cause of death was MI     Mother:  at age 94         Social History:     Occupation: Retired (Prior occupation: [employer])     Marital Status:       Children: 5 children         Tobacco/Alcohol/Supplements:     Last Reviewed on 2019 03:20 PM by Smooth Alejandre    Tobacco: She has never smoked.          Alcohol:  Does not drink alcohol and never has.          Substance Abuse History:     Last Reviewed on 2019 03:20 PM by Smooth Alejandre    NEGATIVE         Mental Health History:     Last Reviewed on 2019 03:20 PM by Smooth Alejandre        Communicable Diseases (eg STDs):     Last Reviewed on 2019 03:20 PM by Smooth Alejandre        Current Problems:     Last Reviewed on 2019 03:20 PM by Smooth Alejandre    Gastro-esophageal reflux disease without esophagitis    Presence of prosthetic heart valve    Heart valve replacement patient    GERD     Acquired hypothyroidism    Other specified hypothyroidism    Essential (primary) hypertension    Long term (current) use of anticoagulants    Hypertension    Use of high risk medications    Mixed hyperlipidemia    Hypercholesterolemia    Type 2 diabetes mellitus without complications    Type 2 diabetes    Dysuria    Dysuria    Major depressive disorder, single episode, moderate    Moderate depression    History of recurrent UTI's    Urinary tract infection, site not specified    Syncope and collapse    Near-syncope    Other fatigue    Malaise and Fatigue    Acute upper respiratory infection, unspecified        Immunizations:     Havrix -adult dose (HepA) 6/18/2018    Havrix -adult dose (HepA) 12/19/2018    Prevnar 13 (Pneumococcal PCV 13) 9/21/2016    Flulaval 9/16/2011    Fluzone pf (3+ years dose) 10/8/2013    Fluzone High-Dose pf (>=65 yr) 10/13/2014    Fluzone High-Dose pf (>=65 yr) 10/26/2015    Fluzone High-Dose pf (>=65 yr) 9/21/2016    Fluzone High-Dose pf (>=65 yr) 9/21/2017    Fluzone High-Dose pf (>=65 yr) 10/5/2018    Fluzone High-Dose pf (>=65 yr) 9/30/2019    PNEUMOVAX 23 (Pneumococcal PPV23) 9/21/2017        Allergies:     Last Reviewed on 12/02/2019 03:20 PM by Smooth Alejandre    medrol dose pack: chills, rash  (Adverse Reaction)    Sulfonamides: rash     Bactrim:      Niacin (Nicotinic Acid):          Current Medications:     Last Reviewed on 12/02/2019 03:20 PM by Smooth Alejandre    Vitamin C     Vitamin E 1,000IU Capsules [1 capsule daily]    Lisinopril 10mg Tablet [Take 1 tablet(s) by mouth daily]    Synthroid 0.075mg Tablet [Take 1 tablet(s) by mouth daily]    Pantoprazole 40mg Tablets, Delayed Release [Take 1 tablet(s) by mouth daily]    Fish Oil 1,000mg Softgel capsule [1 / day]    Potassium Chloride 20mEq Tablets, Extended Release [Take 1 tablet(s) by mouth bid]    Atorvastatin Calcium 20mg Tablet [1 tab daily]    Accu-Chek Lana Plus Test Strips  Reagent Strips [check blood sugar once daily   "DXE11.9]    Accu-Chek Softclix  Lancet [Check blood sugar 1 x per day. DX: E11.9]    Metoprolol Succinate 25mg Tablets, Extended Release [1 tab PO daily]    Cephalexin 250mg Capsules [1 capsule daily]    Metformin HCl 500mg Tablets, Extended Release [Take 2 tablet(s) by mouth daily]    Vitamin D3     CoQ10 daily    Uribel 118mg/40.8mg/36mg/10mg/0.12mg Capsules [take 1 bid prn ]    Furosemide 20mg Tablets [1 tab daily]    hemp oil 2500mg daily        Objective:        Vitals:         Current: 12/2/2019 2:42:18 PM    Ht:  5 ft, 6 in;  Wt: 171.8 lbs;  BMI: 27.7T: 97.7 F (oral);  BP: 120/40 mm Hg (left arm, sitting);  P: 70 bpm (left arm (BP Cuff), sitting);  sCr: 0.78 mg/dL;  GFR: 61.53        Exams:     PHYSICAL EXAM:     GENERAL: vital signs recorded - well developed, well nourished;  no apparent distress;     EYES: conjunctiva and cornea are normal;     E/N/T: EARS: external auditory canal occluded by cerumen on the right;  left TM is normal;  NOSE:  normal nasal mucosa, septum, turbinates, and sinuses; OROPHARYNX: posterior pharynx shows no exudate and erythema;     NECK: trachea is midline; thyroid is non-palpable;     RESPIRATORY: Clear to auscultation bilaterally; no rales (\"crackles\") present; no rhonchi; no wheezes;     CARDIOVASCULAR: normal rate; rhythm is regular;  a systolic murmur is noted: it is grade 3/6;  no edema;     LYMPHATIC: bilateral anterior cervical nodes;     BREAST/INTEGUMENT: No significant rashes, lesions or suspicious moles within limits of examination;     NEUROLOGIC: Grossly intact; mental status: alert and oriented x 3;     PSYCHIATRIC: appropriate affect and demeanor; normal speech pattern; Normal behavior;         Assessment:         H61.21   Impacted cerumen, right ear       J06.9   Acute upper respiratory infection, unspecified           ORDERS:         Procedures Ordered:       [id number]  Removal of impacted cerumen right ear (NURSE)  (In-House)                      Plan:         " Impacted cerumen, right ear- Cerumen impaction removed by nursing staff in office today. Procedure tolerated well without complication and resulted in improvement of symptoms.         Acute upper respiratory infection, unspecified- Like viral URI. Will treat supportively. OTC decongestants and cough suppressants as needed. Advised  good hydration and rest. ED/return precautions given.             Other Orders      [id number]  Removal of impacted cerumen right ear (NURSE)  (In-House)              Charge Capture:         Primary Diagnosis:     H61.21  Impacted cerumen, right ear           Orders:      [id number]  Office/outpatient visit; established patient, level 4  (In-House)              J06.9  Acute upper respiratory infection, unspecified         Other Orders:       [id number]  Removal of impacted cerumen right ear (NURSE)  (In-House)

## 2021-05-18 NOTE — PROGRESS NOTES
Zoila Whipple  1940     Office/Outpatient Visit    Visit Date: Thu, Mar 12, 2020 10:58 am    Provider: Jewel Chavez MD (Assistant: Breann Chandra RN)    Location: [facility]        Electronically signed by Jewel Chavez MD on  03/14/2020 10:04:05 AM                             Subjective:        CC: 'I'm worried about my kidney'    HPI:       Zoila Dixon started having urinary symptoms 3-4 days ago.  She noted some urinary frequency and pain with urination.  She has had an issue with frequent recurrent UTIs and is currently taking cephalexin on a daily basis for preventive purposes.  It has been some time since she had UTI.  She did call her doctor in [place] today, but they would not let her come because she had been on a cruise.          Zoila has also noted some issue with itching in the R upper abdomen and pain in the shoulder blade area in the back.  She has not had any ultrasound evaluation of the gall bladder.  She denies nausea with eating.      ROS:     CONSTITUTIONAL:  Negative for chills and fever.      CARDIOVASCULAR:  Negative for chest pain and palpitations.      RESPIRATORY:  Negative for recent cough and dyspnea.      GASTROINTESTINAL:  Negative for abdominal pain, nausea and vomiting.      INTEGUMENTARY/BREAST:  Negative for atypical mole(s) and rash.          Past Medical History / Family History / Social History:         Last Reviewed on 3/12/2020 11:20 AM by Jewel Chavez    Past Medical History:                 PAST MEDICAL HISTORY         Hypertension    Aortic Valve Replacement - mechanical     Gastroesophageal Reflux Disease     Hypothyroidism         CURRENT MEDICAL PROVIDERS:    Cardiologist: Sky oBwman    Urologist: Dr. Jensen             ADVANCE DIRECTIVES: Living will - Her children would make decisions for her if needed.          PREVENTIVE HEALTH MAINTENANCE             BONE DENSITY: was last done 6/27/19 with the following abnormality  noted-- Osteopenia     COLORECTAL CANCER SCREENING: Up to date (colonoscopy q10y; sigmoidoscopy q5y; Cologuard q3y) was last done 2014, Results are in chart; colonoscopy with the following abnormalities noted-- pelvic fixation - limited exam     EYE EXAM: Diabetic Eye Exam during this calendar year and results are in chart was last done 2019     MAMMOGRAM: Done within last 2 years and results in are chart was last done 19 with normal results         Surgical History:         Appendectomy: at age 24;     Hysterectomy: at age 24; complete;     Hernia Repair: right inguinal;     Laminectomy: lumbar region; 1975;     Aortic Valve Replacement;    Back Surgery;    Skin Cancer Removal; Procedures: colonoscopy /normal heart cath          Family History:         Positive for Myocardial Infarction ( father ).  Father:  at age 49; Cause of death was MI     Mother:  at age 94         Social History:     Occupation: Retired (Prior occupation: [employer])     Marital Status:       Children: 5 children         Tobacco/Alcohol/Supplements:     Last Reviewed on 3/12/2020 11:20 AM by Jewel Chavez    Tobacco: She has never smoked.          Alcohol:  Does not drink alcohol and never has.          Substance Abuse History:     Last Reviewed on 3/12/2020 11:20 AM by Jewel Chavez    NEGATIVE         Mental Health History:     Last Reviewed on 3/12/2020 11:20 AM by Jewel Chavez        Communicable Diseases (eg STDs):     Last Reviewed on 3/12/2020 11:20 AM by Jewel Chavez        Current Problems:     Last Reviewed on 3/12/2020 11:20 AM by Jewel Chavez    Gastro-esophageal reflux disease without esophagitis    Presence of prosthetic heart valve    Other specified hypothyroidism    Essential (primary) hypertension    Long term (current) use of anticoagulants    Mixed hyperlipidemia    Type 2 diabetes mellitus without complications    Dysuria    Major  depressive disorder, single episode, moderate    Urinary tract infection, site not specified    Syncope and collapse    Other fatigue    Impacted cerumen, right ear    Acute upper respiratory infection, unspecified    Pain in left arm    Pain in left hand    Laceration without foreign body of scalp, initial encounter    Diarrhea, unspecified    Pneumonia, unspecified organism    Right upper quadrant pain        Immunizations:     Havrix -adult dose (HepA) 6/18/2018    Havrix -adult dose (HepA) 12/19/2018    Prevnar 13 (Pneumococcal PCV 13) 9/21/2016    Flulaval 9/16/2011    Fluzone pf (3+ years dose) 10/8/2013    Fluzone High-Dose pf (>=65 yr) 10/13/2014    Fluzone High-Dose pf (>=65 yr) 10/26/2015    Fluzone High-Dose pf (>=65 yr) 9/21/2016    Fluzone High-Dose pf (>=65 yr) 9/21/2017    Fluzone High-Dose pf (>=65 yr) 10/5/2018    Fluzone High-Dose pf (>=65 yr) 9/30/2019    PNEUMOVAX 23 (Pneumococcal PPV23) 9/21/2017        Allergies:     Last Reviewed on 3/12/2020 11:20 AM by Jewel Chavez    medrol dose pack: Rash,chills  (Adverse Reaction)    Sulfonamides: Rash     Bactrim:      Niacin (Nicotinic Acid):          Current Medications:     Last Reviewed on 3/12/2020 11:20 AM by Jewel Chavez    Vitamin E 1,000 unit oral capsule [1 capsule daily]    Endur-C with mary jo hips     lisinopriL 10 mg oral tablet [Take 1 tablet(s) by mouth daily]    Pantoprazole 40 mg oral tablet, delayed release (enteric coated) [Take 1 tablet(s) by mouth daily]    Fish Oil 300-1,000 mg oral capsule [1 / day]    Accu-Chek Lana Plus Test Strips  Reagent Strips [check blood sugar once daily  DXE11.9]    Accu-Chek Softclix  Lancet [Check blood sugar 1 x per day. DX: E11.9]    Metoprolol Succinate 25 mg oral Tablet, Extended Release 24 hr [1 tab PO daily]    Cephalexin 250 mg oral capsule [1 capsule daily]    metFORMIN 500 mg oral Tablet, Extended Release 24 hr [Take 2 tablet(s) by mouth daily]    Kids Vitamin D3     CoQ10  daily     Uribel 118-10-40.8-36 mg oral capsule [take 1 bid prn ]    Furosemide 20 mg oral tablet [1 tab daily]    potassium chloride 10 mEq oral capsule, extended release [one BID]    Transderm-Scop 1 mg over 3 days Transdermal Patch,Transdermal 3 day [apply 1 patch by transdermal route to the hairless area behind 1 ear at least 4 hr before effect is required; reapply every 3 days as needed]    nebulizers kit  [use tid with albuterol DX J18.9]    albuterol sulfate 2.5 mg /3 mL (0.083 %) Inhalation Solution for Nebulization [inhale 3 milliliters (2.5 mg) by nebulization route 3 times per day Dx J18.9]    levothyroxine 100 mcg oral tablet [take 1 tablet (100 mcg) by oral route once daily]        Objective:        Vitals:         Current: 3/12/2020 11:03:56 AM    Ht:  5 ft, 6 in;  Wt: 171.8 lbs;  BMI: 27.7T: 97.8 F (oral);  BP: 130/70 mm Hg (left arm, sitting);  P: 64 bpm (left arm (BP Cuff), sitting);  sCr: 0.8 mg/dL;  GFR: 59.99        Exams:     PHYSICAL EXAM:     GENERAL: vital signs recorded - well developed, well nourished;  no apparent distress;     NECK: range of motion is normal; thyroid is non-palpable;     RESPIRATORY: normal respiratory rate and pattern with no distress; normal breath sounds with no rales, rhonchi, wheezes or rubs;     CARDIOVASCULAR: normal rate; rhythm is regular;  no systolic murmur; no edema;     GASTROINTESTINAL: mild RUQ tenderness;  normal bowel sounds; no organomegaly;     LYMPHATIC: no enlargement of cervical or facial nodes; no supraclavicular nodes;     BREAST/INTEGUMENT: SKIN: no significant rashes or lesions; no suspicious moles;     PSYCHIATRIC: appropriate affect and demeanor; normal psychomotor function;         Assessment:         R30.0   Dysuria       R10.11   Right upper quadrant pain           ORDERS:         Meds Prescribed:       [New Rx] ciprofloxacin HCl 500 mg oral tablet [take 1 tablet (500 mg) by oral route 2 times per day], #20 (twenty) tablets, Refills: 0  (zero)         Radiology/Test Orders:       [id number]  Ultrasound Right Upper Quadrant abdomen limited  (Send-Out)              Lab Orders:       [id number]  [facility] Urinalysis, automated, with micro  (Send-Out)                      Plan:         Dysuria        RECOMMENDATIONS given include: Today, we have reviewed Zoila Dixon's care.  Her urine does appear infected.  We will recommend treating this as noted below initially.  I am concerned about the potential for resistance.  She has not had a UTI for some time, but she did have multiple antibiotics recently for pneumonia.  We will see what the culture shows and go from there. With regard to this ongoing abdominal issue, we will make referral for ultrasound of the liver and gall bladder.  We will follow closely..            Prescriptions:       [New Rx] ciprofloxacin HCl 500 mg oral tablet [take 1 tablet (500 mg) by oral route 2 times per day], #20 (twenty) tablets, Refills: 0 (zero)           Orders:       [id number]  [facility] Urinalysis, automated, with micro  (Send-Out)              Right upper quadrant pain        RADIOLOGY:  I have ordered Abdominal Ultrasound Limited Right Upper Quadrant to be done today.            Orders:       [id number]  Ultrasound Right Upper Quadrant abdomen limited  (Send-Out)                  Charge Capture:         Primary Diagnosis:     R30.0  Dysuria           Orders:      [id number]  Office/outpatient visit; established patient, level 4  (In-House)              R10.11  Right upper quadrant pain

## 2021-05-18 NOTE — PROGRESS NOTES
Zoila Whipple 1940     Office/Outpatient Visit    Visit Date: Thu, Aug 2, 2018 11:09 am    Provider: Sadia Whipple N.P. (Assistant: Radha Walden MA)    Location: [facility]        Electronically signed by Sadia Whipple N.P. on  08/02/2018 12:12:24 PM                             SUBJECTIVE:        CC:     Zoila Dixon is a 77 year old White female.  presents today due to rash         HPI:         Patient complains of rash.  This has been a problem for the past 3 days.  The rash is widespread and diffuse in its location.  She describes the rash as red.  The rash is pruritic.      ROS:     CONSTITUTIONAL:  Negative for fever.      CARDIOVASCULAR:  Negative for chest pain, palpitations, tachycardia, orthopnea, and edema.      RESPIRATORY:  Negative for cough, dyspnea, and hemoptysis.      NEUROLOGICAL:  Negative for dizziness, headaches, paresthesias, and weakness.          PM/FM/:     Last Reviewed on 8/02/2018 11:31 AM by Sadia Whipple    Past Medical History:                 PAST MEDICAL HISTORY         Hypertension    Aortic Valve Replacement - mechanical     Gastroesophageal Reflux Disease     Hypothyroidism         CURRENT MEDICAL PROVIDERS:    Cardiologist: Sky Bowman    Urologist: Dr. Jensen         PREVENTIVE HEALTH MAINTENANCE             BONE DENSITY: was last done 05/2017 with the following abnormality noted-- osteopenia     COLORECTAL CANCER SCREENING: Up to date (colonoscopy q10y; sigmoidoscopy q5y; Cologuard q3y) was last done 04/2014, Results are in chart; colonoscopy with the following abnormalities noted-- pelvic fixation - limited exam     MAMMOGRAM: Done within last 2 years and results in are chart was last done 6/12/18 with normal results         Surgical History:         Appendectomy: at age 24;     Hysterectomy: at age 24; complete;      Hernia Repair: right inguinal;     Laminectomy: lumbar region; 1975;     Aortic Valve Replacement;    Back Surgery;    Skin  Cancer Removal; Procedures: colonoscopy /normal heart cath          Family History:         Positive for Myocardial Infarction ( father ).  Father:  at age 49; Cause of death was MI     Mother:  at age 94         Social History:     Occupation: Retired (Prior occupation: [employer])     Marital Status:      Children: 5 children         Tobacco/Alcohol/Supplements:     Last Reviewed on 2018 11:12 AM by Radha Walden    Tobacco: She has never smoked.          Alcohol:  Does not drink alcohol and never has.          Substance Abuse History:     Last Reviewed on 2/15/2018 10:54 AM by Jewel Chavez    NEGATIVE         Mental Health History:     Last Reviewed on 2/15/2018 10:54 AM by Jewel Chavez        Communicable Diseases (eg STDs):     Last Reviewed on 2/15/2018 10:54 AM by Jewel Chavez            Immunizations:     Prevnar 13 (Pneumococcal PCV 13) 2016     Flulaval 2011     Fluzone pf (3+ years dose) 10/8/2013     Fluzone High-Dose pf (>=65 yr) 10/13/2014     Fluzone High-Dose pf (>=65 yr) 10/26/2015     Fluzone High-Dose pf (>=65 yr) 2016     Fluzone High-Dose pf (>=65 yr) 2017     PNEUMOVAX 23 (Pneumococcal PPV23) 2017         Allergies:     Last Reviewed on 2018 11:12 AM by Radha Walden    Sulfonamides: rash    Niacin (Nicotinic Acid):    medrol dose pack: chills, rash (Adverse Reaction)        Current Medications:     Last Reviewed on 2018 11:15 AM by Radha Walden    Nystatin/Triamcinolone 100,000U/1gm/0.1% Cream Apply thin film to affected area bid     Pantoprazole 40mg Tablets, Delayed Release Take 1 tablet(s) by mouth daily     Synthroid 0.075mg Tablet Take 1 tablet(s) by mouth daily     Metformin HCl 500mg Tablets, Extended Release 2 po daily     Accu-Chek Lana Plus Test Strips  Reagent Strips check blood sugar once daily  DXE11.9     Accu-Chek Softclix  Lancet Check blood sugar 1 x per day.  DX: E11.9     Metoprolol Succinate 25mg Tablets, Extended Release 1 tab PO daily     Atorvastatin Calcium 40mg Tablet Take 1 tablet(s) by mouth daily     Potassium Chloride 20mEq Tablets, Extended Release Take 1 tablet(s) by mouth bid     CoQ10 daily     Fish Oil 1,000mg Softgel capsule 1 / day     Vitamin C     Vitamin D3     Vitamin E 1,000IU Capsules 1 capsule daily         OBJECTIVE:        Vitals:         Current: 8/2/2018 11:11:14 AM    Ht:  5 ft, 6 in;  Wt: 160.3 lbs;  BMI: 25.9    T: 97.6 F (oral);  BP: 131/51 mm Hg (left arm, sitting);  P: 64 bpm (left arm (BP Cuff), sitting);  sCr: 0.67 mg/dL;  GFR: 71.81        Exams:     PHYSICAL EXAM:     GENERAL: vital signs recorded - well developed, well nourished;  no apparent distress;     RESPIRATORY: normal respiratory rate and pattern with no distress; normal breath sounds with no rales, rhonchi, wheezes or rubs;     CARDIOVASCULAR: normal rate; rhythm is regular;  + prosthetic heart valve sounds;     BREAST/INTEGUMENT:; insect bites, scattered on left neck, abdomen and extremities     MUSCULOSKELETAL: No CVA tenderness     PSYCHIATRIC:  appropriate affect and demeanor; normal speech pattern; grossly normal memory;         ASSESSMENT           782.1   R21  Rash              DDx:         ORDERS:         Meds Prescribed:       Hydroxyzine HCl 25mg Tablet 1 q 6 hours PRN itching  #30 (Thirty) tablet(s) Refills: 0                 PLAN:          Rashwas on cipro recently         FOLLOW-UP: Advised to call if there is no improvement in 4 day(s).            Prescriptions:       Hydroxyzine HCl 25mg Tablet 1 q 6 hours PRN itching  #30 (Thirty) tablet(s) Refills: 0             CHARGE CAPTURE           **Please note: ICD descriptions below are intended for billing purposes only and may not represent clinical diagnoses**        Primary Diagnosis:         782.1 Rash            R21    Rash and other nonspecific skin eruption              Orders:          [id number]    Office/outpatient visit; established patient, level 3  (In-House)

## 2021-05-18 NOTE — PROGRESS NOTES
Zoila Whipple 1940     Office/Outpatient Visit    Visit Date: Mon, Jul 1, 2019 11:20 am    Provider: Jewel Chavez MD (Assistant: Breann Chandra RN)    Location: [facility]        Electronically signed by Jewel Chavez MD on  07/02/2019 05:46:44 AM                             SUBJECTIVE:        CC: physical exam         HPI:         Zoila Dixon is here for a Medicare wellness visit.  The required HRA questions are integrated within this visit note. Family medical history and individual medical/surgical history were reviewed and updated.  A current height, weight, BMI, blood pressure, and pulse were recorded in the vitals section of the note and have been reviewed. Patient's medications, including supplements, were recorded in the chart and reviewed.  Current providers and suppliers were reviewed and updated.          Self-Assessment of Health: She rates her health as very good. She rates her confidence of being able to control/manage most of her health problems as very confident. Her physical/emotional health has limited her social activites not at all.  A review of cognitive impairment was performed, including ability to drive a car, manage finances, and any memory changes, and was found to be negative.  A review of functional ability, including bathing, dressing, walking, and urine/bowel continence as well as level of safety was performed and was found to be negative.  Falls Risk: Has not had any falls or only one fall without injury in the past year.  She denies having trouble hearing the TV/radio when others do not, having to strain to hear or understand conversations and wearing hearing aid(s).  Concerning home safety, she reports that at home she DOES have adequate lighting, a skid resistant shower/tub, grab bars in the bath, handrails on stairs, functioning smoke alarms and absence of throw rugs.          Immunization Status: ** >10 years since last Td booster; Physical Activity: She  exercises but less than 20 minutes 3 days per week; Type of diet patient normally eats is described as says that her diet is 'fair' Tobacco: She has never smoked.          Additionally, she presents with history of acquired hypothyroidism.  she is currently taking Levothyroid, 75 mcg daily.  TSH was last checked several months ago.  The result was reported as normal.  She denies any related symptoms.          With regard to the type 2 diabetes, current meds include an oral hypoglycemic ( Glucophage XR ).  Most recent lab results include LDL:  37 (mg/dL) (04/26/2019), Hemoglobin A1c:  6.6 (%) (04/26/2019).          Concerning hypercholesterolemia, current treatment includes a low cholesterol/low fat diet.  Compliance with treatment has been good; she maintains her low cholesterol diet and follows up as directed.      ROS:     CONSTITUTIONAL:  Negative for chills and fever.      CARDIOVASCULAR:  Negative for chest pain and palpitations.      RESPIRATORY:  Negative for recent cough and dyspnea.      GASTROINTESTINAL:  Negative for abdominal pain, nausea and vomiting.      INTEGUMENTARY/BREAST:  Negative for atypical mole(s) and rash.          PMH/FMH/SH:     Last Reviewed on 7/01/2019 11:59 AM by Jewel Chavez    Past Medical History:                 PAST MEDICAL HISTORY         Hypertension    Aortic Valve Replacement - mechanical     Gastroesophageal Reflux Disease     Hypothyroidism         CURRENT MEDICAL PROVIDERS:    Cardiologist: Sky Bowman    Urologist: Dr. Jensen             ADVANCE DIRECTIVES: Living will - Her children would make decisions for her if needed.          PREVENTIVE HEALTH MAINTENANCE             BONE DENSITY: was last done 6/27/19 with the following abnormality noted-- Osteopenia     COLORECTAL CANCER SCREENING: Up to date (colonoscopy q10y; sigmoidoscopy q5y; Cologuard q3y) was last done 04/2014, Results are in chart; colonoscopy with the following abnormalities noted-- pelvic  fixation - limited exam     EYE EXAM: Diabetic Eye Exam during this calendar year and results are in chart was last done 2018     MAMMOGRAM: Done within last 2 years and results in are chart was last done 19 with normal results         Surgical History:         Appendectomy: at age 24;     Hysterectomy: at age 24; complete;      Hernia Repair: right inguinal;     Laminectomy: lumbar region; ;     Aortic Valve Replacement;    Back Surgery;    Skin Cancer Removal; Procedures: colonoscopy /normal heart cath          Family History:         Positive for Myocardial Infarction ( father ).  Father:  at age 49; Cause of death was MI     Mother:  at age 94         Social History:     Occupation: Retired (Prior occupation: [employer])     Marital Status:       Children: 5 children         Tobacco/Alcohol/Supplements:     Last Reviewed on 2019 11:59 AM by Jewel Chavez    Tobacco: She has never smoked.          Alcohol:  Does not drink alcohol and never has.          Substance Abuse History:     Last Reviewed on 2019 11:59 AM by Jewel Chavez    NEGATIVE         Mental Health History:     Last Reviewed on 2019 11:59 AM by Jewel Chavez        Communicable Diseases (eg STDs):     Last Reviewed on 2019 11:59 AM by Jewel Chavez            Current Problems:     Last Reviewed on 2019 11:59 AM by Jewel Chavez    Malaise and Fatigue     Near-syncope     History of recurrent UTI's     Moderate depression     Type 2 diabetes     Hypercholesterolemia     Use of high risk medications     Hypertension     Acquired hypothyroidism     Heart valve replacement patient     GERD     Dysuria         Immunizations:     Havrix -adult dose (HepA) 2018     Havrix -adult dose (HepA) 2018     Prevnar 13 (Pneumococcal PCV 13) 2016     Flulaval 2011     Fluzone pf (3+ years dose) 10/8/2013     Fluzone High-Dose pf  (>=65 yr) 10/13/2014     Fluzone High-Dose pf (>=65 yr) 10/26/2015     Fluzone High-Dose pf (>=65 yr) 9/21/2016     Fluzone High-Dose pf (>=65 yr) 9/21/2017     Fluzone High-Dose pf (>=65 yr) 10/5/2018     PNEUMOVAX 23 (Pneumococcal PPV23) 9/21/2017         Allergies:     Last Reviewed on 7/01/2019 11:59 AM by Jewel Chavez    Sulfonamides: rash    Bactrim:    Niacin (Nicotinic Acid):        Current Medications:     Last Reviewed on 7/01/2019 11:59 AM by Jewel Chavez    Metformin HCl 500mg Tablets, Extended Release Take 2 tablet(s) by mouth daily     Pantoprazole 40mg Tablets, Delayed Release Take 1 tablet(s) by mouth daily     Synthroid 0.075mg Tablet Take 1 tablet(s) by mouth daily     Accu-Chek Lana Plus Test Strips  Reagent Strips check blood sugar once daily  DXE11.9     Accu-Chek Softclix  Lancet Check blood sugar 1 x per day. DX: E11.9     Potassium Chloride 20mEq Tablets, Extended Release Take 1 tablet(s) by mouth bid     Cephalexin 250mg Capsules 1 capsule daily     Furosemide 20mg Tablets 1 tab daily     hemp oil 2500mg daily     Uribel Capsules take 1 bid prn     CoQ10 daily     Fish Oil 1,000mg Softgel capsule 1 / day     Vitamin C     Vitamin D3     Vitamin E 1,000IU Capsules 1 capsule daily     Metoprolol Succinate 25mg Tablets, Extended Release 1 tab PO daily     Atorvastatin Calcium 20mg Tablet 1 tab daily         OBJECTIVE:        Vitals:         Current: 7/1/2019 11:26:07 AM    Ht:  5 ft, 6 in;  Wt: 172.4 lbs;  BMI: 27.8    T: 97.5 F (oral);  BP: 164/52 mm Hg (left arm, sitting);  P: 57 bpm (left arm (BP Cuff), sitting);  sCr: 0.78 mg/dL;  GFR: 62.62        Repeat:     11:52:42 AM     BP:   149/50mm Hg (left arm, sitting)     12:16:00 PM     BP:   148/48mm Hg (left arm, sitting)         Exams:     PHYSICAL EXAM:     GENERAL: vital signs recorded - well developed, well nourished;  no apparent distress;     EYES: extraocular movements intact; conjunctiva and cornea are normal;  PERRL; binocular vision is 20/25 - hearing is normal     E/N/T:  normal EACs, TMs, nasal/oral mucosa, teeth, gingiva, and oropharynx;     NECK: range of motion is normal; thyroid is non-palpable;     RESPIRATORY: normal respiratory rate and pattern with no distress; normal breath sounds with no rales, rhonchi, wheezes or rubs;     CARDIOVASCULAR: normal rate; rhythm is regular;  a systolic murmur is noted: it is grade 3/6;  no edema;     GASTROINTESTINAL: nontender; normal bowel sounds; no organomegaly;     LYMPHATIC: no enlargement of cervical or facial nodes; no supraclavicular nodes;     BREAST/INTEGUMENT: SKIN: no significant rashes or lesions; no suspicious moles;     NEUROLOGIC: mental status: alert and oriented x 3; cranial nerves II-XII grossly intact;     PSYCHIATRIC: appropriate affect and demeanor; normal psychomotor function;         ASSESSMENT           V70.0   Z00.01  Yearly physical exam              DDx:     244.8   E03.8  Acquired hypothyroidism              DDx:     250.00   E11.9  Type 2 diabetes              DDx:     272.0   E78.2  Hypercholesterolemia              DDx:         ORDERS:         Meds Prescribed:       Refill of: Metformin HCl 500mg Tablets, Extended Release Take 2 tablet(s) by mouth daily  #180 (One [place] [address]) tablet(s) Refills: 1       Refill of: Pantoprazole 40mg Tablets, Delayed Release Take 1 tablet(s) by mouth daily  #90 (Ninety) tablet(s) Refills: 1       Refill of: Synthroid (Levothyroxine Sodium) 0.075mg Tablet Take 1 tablet(s) by mouth daily  #90 (Ninety) tablet(s) Refills: 1         Radiology/Test Orders:       3014F  Screening mammography results documented and reviewed (PV)1  (In-House)         2022F  Dilated retinal eye exam w/interpret by ophthalmologist/optometrist documented/reviewed (DM)4  (In-House)           Other Orders:         Depression screen negative  (In-House)         1101F  Pt screen for fall risk; document no falls in past year or only 1  fall w/o injury in past year (FREDIS)  (In-House)           Negative EtOH screen  (In-House)                   PLAN:          Yearly physical exam     Zoila Dixon seems well today.  I'm going to refill her medications as noted.  We will contact her in a couple of months for follow up labs.  Please see attached wellness recommendations.  I did recommend she use our free blood pressure check on 7/27 if possible.     MIPS PHQ-9 Depression Screening: Completed form scanned and in chart; Total Score 0; Negative Depression Screen Negative alcohol screen           Orders:         Depression screen negative  (In-House)         1101F  Pt screen for fall risk; document no falls in past year or only 1 fall w/o injury in past year (FREDIS)  (In-House)           Negative EtOH screen  (In-House)         3014F  Screening mammography results documented and reviewed (PV)1  (In-House)         2022F  Dilated retinal eye exam w/interpret by ophthalmologist/optometrist documented/reviewed (DM)4  (In-House)             Patient Education Handouts:       Physical Exam 60+ year, Female           Acquired hypothyroidism As above.           Prescriptions:       Refill of: Synthroid (Levothyroxine Sodium) 0.075mg Tablet Take 1 tablet(s) by mouth daily  #90 (Ninety) tablet(s) Refills: 1          Type 2 diabetes As above.           Prescriptions:       Refill of: Metformin HCl 500mg Tablets, Extended Release Take 2 tablet(s) by mouth daily  #180 (One [place] and [address]) tablet(s) Refills: 1          Hypercholesterolemia As above.             Other Prescriptions:       Refill of: Pantoprazole 40mg Tablets, Delayed Release Take 1 tablet(s) by mouth daily  #90 (Ninety) tablet(s) Refills: 1         CHARGE CAPTURE           **Please note: ICD descriptions below are intended for billing purposes only and may not represent clinical diagnoses**        Primary Diagnosis:         V70.0 Yearly physical exam            Z00.01    Encounter for general  adult medical exam w abnormal findings              Orders:          [id number]   Preventive medicine, established patient, age 65+ years  (In-House)                Depression screen negative  (In-House)             1101F   Pt screen for fall risk; document no falls in past year or only 1 fall w/o injury in past year (FREDIS)  (In-House)                Negative EtOH screen  (In-House)             3014F   Screening mammography results documented and reviewed (PV)1  (In-House)             2022F   Dilated retinal eye exam w/interpret by ophthalmologist/optometrist documented/reviewed (DM)4  (In-House)           244.8 Acquired hypothyroidism            E03.8    Other specified hypothyroidism    250.00 Type 2 diabetes            E11.9    Type 2 diabetes mellitus without complications    272.0 Hypercholesterolemia            E78.2    Mixed hyperlipidemia

## 2021-05-18 NOTE — PROGRESS NOTES
Zoila Whipple  1940     Office/Outpatient Visit    Visit Date: Mon, Mar 22, 2021 02:28 pm    Provider: Jewel Chavez MD (Assistant: Breann Chandra RN)    Location: [facility]        Electronically signed by Jewel Chavez MD on  03/24/2021 08:49:21 AM                             Subjective:        CC: 'I felt like I was going to pass out'    HPI:       Zoila Dixon is in today for follow up on near syncope.  She had three episodes where she felt like she would pass out.  She says the most recent episode occurred just a few days ago.  The episode lasted 5-10 minutes.  She had to sit down and wait.  If the episode had not passed, she would have gone to the ER for evaluation.  It scared her.  She does have history of prosthetic aortic valve and has an echo in about 10 days.  She has noted some headache as well which concerns her.          Dx with other specified hypothyroidism; she is currently taking Levothyroid, 100 mcg daily.  TSH was last checked several weeks ago.  The result was reported as normal.  She denies any related symptoms.            Additionally, she presents with history of essential (primary) hypertension.  her current cardiac medication regimen includes a diuretic ( Demadex ), a beta-blocker ( Toprol-XL ), and an ACE inhibitor ( Zestril ).  She did not bring her blood pressure diary, but says that pressures have been too high.  She is tolerating the medication well without side effects.  Compliance with treatment has been good; she takes her medication as directed and follows up as directed.            Dx with mixed hyperlipidemia; current treatment includes Crestor.  Compliance with treatment has been good; she maintains her low cholesterol diet and follows up as directed.            Dx with type 2 diabetes mellitus without complications; current meds include NONE.  She reports home blood glucose readings have been a bit high, with average fasting readings in the  150-180 mg/dL range.  Most recent lab results include LDL:  56 (mg/dL) (03/10/2021), Hemoglobin A1c:  7.3 (%) (2020),  8.3 (%) (03/10/2021).      ROS:     CONSTITUTIONAL:  Negative for chills and fever.      CARDIOVASCULAR:  Negative for chest pain and palpitations.      RESPIRATORY:  Negative for recent cough and dyspnea.      GASTROINTESTINAL:  Negative for abdominal pain, nausea and vomiting.      INTEGUMENTARY/BREAST:  Negative for atypical mole(s) and rash.          Past Medical History / Family History / Social History:         Last Reviewed on 2020 02:30 PM by Jewel Chavez    Past Medical History:                 PAST MEDICAL HISTORY         Hypertension    Aortic Valve Replacement - mechanical     Gastroesophageal Reflux Disease     Hypothyroidism         CURRENT MEDICAL PROVIDERS:    Cardiologist: Sky Bowman    Urologist: Dr. Jensen             ADVANCE DIRECTIVES: Living will - Her children would make decisions for her if needed.          PREVENTIVE HEALTH MAINTENANCE             BONE DENSITY: was last done 19 with the following abnormality noted-- Osteopenia     COLORECTAL CANCER SCREENING: Up to date (colonoscopy q10y; sigmoidoscopy q5y; Cologuard q3y) was last done 2014, Results are in chart; colonoscopy with the following abnormalities noted-- pelvic fixation - limited exam     EYE EXAM: Diabetic Eye Exam during this calendar year and results are in chart was last done 2019     MAMMOGRAM: Done within last 2 years and results in are chart was last done 19 with normal results         Surgical History:         Appendectomy: at age 24;     Hysterectomy: at age 24; complete;     Hernia Repair: right inguinal;     Laminectomy: lumbar region; ;     Aortic Valve Replacement;    Back Surgery;    Skin Cancer Removal; Procedures: colonoscopy /normal heart cath -         Family History:         Positive for Myocardial Infarction ( father ).  Father:  at age  49; Cause of death was MI     Mother:  at age 94         Social History:     Occupation: Retired (Prior occupation: [employer])     Marital Status:       Children: 5 children         Tobacco/Alcohol/Supplements:     Last Reviewed on 12/10/2020 12:46 PM by Breann Chandra    Tobacco: She has never smoked.          Alcohol:  Does not drink alcohol and never has.          Substance Abuse History:     Last Reviewed on 2020 02:30 PM by Jewel Chavez    NEGATIVE         Mental Health History:     Last Reviewed on 2020 02:30 PM by Jewel Chavez        Communicable Diseases (eg STDs):     Last Reviewed on 2020 02:30 PM by Jewel Chavez        Current Problems:     Last Reviewed on 2020 02:30 PM by Jewel Chavez    Gastro-esophageal reflux disease without esophagitis    Presence of prosthetic heart valve    Other specified hypothyroidism    Essential (primary) hypertension    Long term (current) use of anticoagulants    Mixed hyperlipidemia    Type 2 diabetes mellitus without complications    Dysuria    Major depressive disorder, single episode, moderate    Urinary tract infection, site not specified    Syncope and collapse    Other fatigue    Impacted cerumen, right ear    Acute upper respiratory infection, unspecified    Pain in left arm    Pain in left hand    Laceration without foreign body of scalp, initial encounter    Diarrhea, unspecified    Pneumonia, unspecified organism    Right upper quadrant pain    Localized edema    Encounter for other screening for malignant neoplasm of breast    Encounter for immunization        Immunizations:     influenza, high-dose, quadrivalent (FLUZONE HIGH-DOSE QUAD -) 2020    Havrix -adult dose (HepA) 2018    Havrix -adult dose (HepA) 2018    Prevnar 13 (Pneumococcal PCV 13) 2016    Flulaval 2011    Fluzone pf (3+ years dose) 10/8/2013    Fluzone High-Dose pf (>=65 yr) 10/13/2014     Fluzone High-Dose pf (>=65 yr) 10/26/2015    Fluzone High-Dose pf (>=65 yr) 9/21/2016    Fluzone High-Dose pf (>=65 yr) 9/21/2017    Fluzone High-Dose pf (>=65 yr) 10/5/2018    Fluzone High-Dose pf (>=65 yr) 9/30/2019    PNEUMOVAX 23 (Pneumococcal PPV23) 9/21/2017        Allergies:     Last Reviewed on 12/10/2020 12:46 PM by Breann Chandra dose pack: Rash,chills  (Adverse Reaction)    Sulfonamides: Rash     Bactrim:      metFORMIN: diarrhea  (Adverse Reaction)    Niacin (Nicotinic Acid):          Current Medications:     Last Reviewed on 5/28/2020 02:30 PM by Jewel Chavez    torsemide 20 mg oral tablet [take 1 tablet (20 mg) by oral route once daily]    Vitamin E 1,000 unit oral capsule [1 capsule daily]    Endur-C with mary jo hips     lisinopriL 10 mg oral tablet [TAKE 1 TABLET EVERY DAY]    pantoprazole 40 mg oral tablet, delayed release (enteric coated) [TAKE 1 TABLET EVERY DAY]    Fish Oil 300-1,000 mg oral capsule [1 / day]    nystatin-triamcinolone 100,000-0.1 unit/g-% Topical Cream [APPLY A THIN FILM TO AFFECTED AREA TWO TIMES A DAY]    Accu-Chek Lana Plus Test Strips  Reagent Strips [check blood sugar once daily  DXE11.9]    Accu-Chek Softclix  Lancet [Check blood sugar 1 x per day. DX: E11.9]    Metoprolol Succinate 25 mg oral Tablet, Extended Release 24 hr [1 tab PO daily]    Cephalexin 250 mg oral capsule [1 capsule daily]    Kids Vitamin D3     CoQ10 daily     Uribel 118-10-40.8-36 mg oral capsule [take 1 bid prn ]    potassium chloride 10 mEq oral capsule, extended release [one BID]    Transderm-Scop 1 mg over 3 days Transdermal Patch,Transdermal 3 day [apply 1 patch by transdermal route to the hairless area behind 1 ear at least 4 hr before effect is required; reapply every 3 days as needed]    nebulizers kit  [use tid with albuterol DX J18.9]    albuterol sulfate 2.5 mg /3 mL (0.083 %) Inhalation Solution for Nebulization [inhale 3 milliliters (2.5 mg) by nebulization route 3 times  per day Dx J18.9]    levothyroxine 100 mcg oral tablet [TAKE 1 TABLET EVERY DAY]    rosuvastatin 5 mg oral tablet [take 1 tablet (5 mg) by oral route once daily]    True Metrix Glucose Meter  [check blood sugar once daily  DX E11.9]    lancets 32 gauge  [check blood sugar once daily w/True Metrix  Dx E11.9]    True Metrix Glucose Test Strip  [check blood sugar once daily  DX E11.9 ok to change to ins preferred]        Objective:        Vitals:         Current: 3/22/2021 2:34:04 PM    Ht:  5 ft, 6 in;  Wt: 173.6 lbs;  BMI: 28.0T: 97.6 F (temporal);  BP: 150/68 mm Hg (right arm, sitting);  P: 70 bpm (right arm (BP Cuff), sitting);  sCr: 0.64 mg/dL;  GFR: 74.10        Repeat:     3:1:18 PM  BP:   160/68mm Hg (right arm, sitting, p-70)     Exams:     PHYSICAL EXAM:     GENERAL: vital signs recorded - well developed, well nourished;  no apparent distress;     EYES: extraocular movements intact; conjunctiva and cornea are normal; PERRL;     NECK: range of motion is normal; thyroid is non-palpable;     RESPIRATORY: normal respiratory rate and pattern with no distress; normal breath sounds with no rales, rhonchi, wheezes or rubs;     CARDIOVASCULAR: normal rate; rhythm is regular;  a systolic murmur is noted: it is grade 4/6 and mechanical murmur;  no edema;     GASTROINTESTINAL: nontender; normal bowel sounds; no organomegaly;     LYMPHATIC: no enlargement of cervical or facial nodes; no supraclavicular nodes;     BREAST/INTEGUMENT: SKIN: no significant rashes or lesions; no suspicious moles;     NEUROLOGIC: mental status: alert and oriented x 3; cranial nerves II-XII grossly intact;     PSYCHIATRIC: appropriate affect and demeanor; normal psychomotor function;         Lab/Test Results:         LABORATORY RESULTS: EKG performed by anibal         Procedures:     Syncope and collapse        Holter Monitor: Holter monitor was hooked up, Zoila Dixon was given instructions on use.  Patient informed to return with device. 24 hour  monitor tls             Assessment:         R55   Syncope and collapse       E03.8   Other specified hypothyroidism       I10   Essential (primary) hypertension       E78.2   Mixed hyperlipidemia       E11.9   Type 2 diabetes mellitus without complications       R51.9   Headache, unspecified           ORDERS:         Meds Prescribed:       [Refilled] lisinopriL 10 mg oral tablet [TAKE 1 TABLET EVERY DAY], #90 (ninety) tablets, Refills: 1 (one)       [Refilled] pantoprazole 40 mg oral tablet, delayed release (enteric coated) [TAKE 1 TABLET EVERY DAY], #90 (ninety) tablets, Refills: 1 (one)       [Refilled] levothyroxine 100 mcg oral tablet [TAKE 1 TABLET EVERY DAY], #90 (ninety) tablets, Refills: 1 (one)         Radiology/Test Orders:       72338  Electrocardiogram, routine with at least 12 leads; with interpretation and report  (In-House)            84500  Holter monitor connection and disconnection  (In-House)              Lab Orders:       [id number]  BDCB2 - H CBC w/o diff  (Send-Out)    [facility]        99072  SED - [facility] Sedimentation rate, non-automated ESR  (Send-Out)    [facility]        50349  MG - [facility] Magnesium, Serum  (Send-Out)    [facility] CARDIOLOGY                  Plan:         Syncope and collapse    LABORATORY:  Labs ordered to be performed today include CBC W/O DIFF and Magnesium level.      TESTS/PROCEDURES:  Will proceed with an ECG and Holter Monitor 24 hour to be performed/scheduled now.      RECOMMENDATIONS given include: I'm concerned by these episodes.  We will evaluate her as noted below and then consider cardiology referral.  If she has an episode that does not respond quickly, then she will go to the ER if needed..            Prescriptions:       [Refilled] lisinopriL 10 mg oral tablet [TAKE 1 TABLET EVERY DAY], #90 (ninety) tablets, Refills: 1 (one)       [Refilled] pantoprazole 40 mg oral tablet, delayed release (enteric coated) [TAKE 1 TABLET  EVERY DAY], #90 (ninety) tablets, Refills: 1 (one)       [Refilled] levothyroxine 100 mcg oral tablet [TAKE 1 TABLET EVERY DAY], #90 (ninety) tablets, Refills: 1 (one)           Orders:       [id number]  BDCB2 - [facility] CBC w/o diff  (Send-Out)    [facility]        01342  Electrocardiogram, routine with at least 12 leads; with interpretation and report  (In-House)            86442  Holter monitor connection and disconnection  (In-House)            50124  MG - [facility] Magnesium, Serum  (Send-Out)    [facility]          Other specified hypothyroidismAs above.        Essential (primary) hypertensionAs above.        Mixed hyperlipidemiaAs above.        Type 2 diabetes mellitus without complicationsAs above.        Headache, unspecified    LABORATORY:  Labs ordered to be performed today include ESR.            Orders:       [id number]  SED - [facility] Sedimentation rate, non-automated ESR  (Send-Out)    [facility]              Charge Capture:         Primary Diagnosis:     R55  Syncope and collapse           Orders:      [id number]  Office/outpatient visit; established patient, level 4  (In-House)            79303  Electrocardiogram, routine with at least 12 leads; with interpretation and report  (In-House)            43319  Holter monitor connection and disconnection  (In-House)              E03.8  Other specified hypothyroidism     I10  Essential (primary) hypertension     E78.2  Mixed hyperlipidemia     E11.9  Type 2 diabetes mellitus without complications     R51.9  Headache, unspecified         ADDENDUMS:      ____________________________________    Addendum: 03/24/2021 10:27 AM - Letha Yeboah        Holter returned and uploaded to [facility].

## 2021-05-18 NOTE — PROGRESS NOTES
Zoila Whipple 1940     Office/Outpatient Visit    Visit Date: Mon, Nov 26, 2018 02:03 pm    Provider: Jewel Chavez MD (Assistant: Sarah Spurling, MA)    Location: [facility]        Electronically signed by Jewel Chavez MD on  11/26/2018 06:16:41 PM                             SUBJECTIVE:        CC: possible sinus infection         HPI:     Zoila Dixon is in today for evaluation of her sinuses.  She does not feel well for the last few days.  She noted some swelling over the R lateral face on Saturday morning.  She says that she does not have teeth.  She is concerned that she may have a sinus infection.  She has not had fever, but she has had some chills.  She denies other acute issue.         Concerning type 2 diabetes, current meds include an oral hypoglycemic ( Glucophage XR ).  Most recent lab results include LDL:  22 (mg/dL) (01/22/2018), Hemoglobin A1c:  6.3 (%) (07/19/2018).          In regard to the acquired hypothyroidism, she is currently taking Levothyroid, 75 mcg daily.  TSH was last checked 4 months ago.  The result was reported as normal.  She denies any related symptoms.          Hypercholesterolemia details; current treatment includes Lipitor.  Compliance with treatment has been good; she takes her medication as directed and follows up as directed.  She denies experiencing any hypercholesterolemia related symptoms.      ROS:     CONSTITUTIONAL:  Positive for chills.   Negative for fever.      CARDIOVASCULAR:  Negative for chest pain and palpitations.      RESPIRATORY:  Negative for recent cough and dyspnea.      GASTROINTESTINAL:  Negative for abdominal pain, nausea and vomiting.      INTEGUMENTARY/BREAST:  Negative for atypical mole(s) and rash.          PMH/FMH/SH:     Last Reviewed on 11/26/2018 02:28 PM by Jewel Chavez    Past Medical History:                 PAST MEDICAL HISTORY         Hypertension    Aortic Valve Replacement - mechanical     Gastroesophageal  Reflux Disease     Hypothyroidism         CURRENT MEDICAL PROVIDERS:    Cardiologist: Sky Bowman    Urologist: Dr. Jensen         PREVENTIVE HEALTH MAINTENANCE             BONE DENSITY: was last done 2017 with the following abnormality noted-- osteopenia     COLORECTAL CANCER SCREENING: Up to date (colonoscopy q10y; sigmoidoscopy q5y; Cologuard q3y) was last done 2014, Results are in chart; colonoscopy with the following abnormalities noted-- pelvic fixation - limited exam     MAMMOGRAM: Done within last 2 years and results in are chart was last done 18 with normal results         Surgical History:         Appendectomy: at age 24;     Hysterectomy: at age 24; complete;      Hernia Repair: right inguinal;     Laminectomy: lumbar region; ;     Aortic Valve Replacement;    Back Surgery;    Skin Cancer Removal; Procedures: colonoscopy /normal heart cath          Family History:         Positive for Myocardial Infarction ( father ).  Father:  at age 49; Cause of death was MI     Mother:  at age 94         Social History:     Occupation: Retired (Prior occupation: [employer])     Marital Status:       Children: 5 children         Tobacco/Alcohol/Supplements:     Last Reviewed on 2018 02:28 PM by Jewel Chavez    Tobacco: She has never smoked.          Alcohol:  Does not drink alcohol and never has.          Substance Abuse History:     Last Reviewed on 2018 02:28 PM by Jewel Chavez    NEGATIVE         Mental Health History:     Last Reviewed on 2018 02:28 PM by Jewel Chavez        Communicable Diseases (eg STDs):     Last Reviewed on 2018 02:28 PM by Jewel Chavez            Current Problems:     Last Reviewed on 2018 02:28 PM by Jewel Chavez    Moderate depression     Type 2 diabetes     Hypercholesterolemia     Use of high risk medications     Hypertension     Acquired hypothyroidism     Heart  valve replacement patient     GERD     Urinary Tract Infection     Dysuria         Immunizations:     Prevnar 13 (Pneumococcal PCV 13) 9/21/2016     Flulaval 9/16/2011     Fluzone pf (3+ years dose) 10/8/2013     Fluzone High-Dose pf (>=65 yr) 10/13/2014     Fluzone High-Dose pf (>=65 yr) 10/26/2015     Fluzone High-Dose pf (>=65 yr) 9/21/2016     Fluzone High-Dose pf (>=65 yr) 9/21/2017     Fluzone High-Dose pf (>=65 yr) 10/5/2018     PNEUMOVAX 23 (Pneumococcal PPV23) 9/21/2017         Allergies:     Last Reviewed on 11/26/2018 02:28 PM by Jewel Chavez    Sulfonamides: rash    Niacin (Nicotinic Acid):        Current Medications:     Last Reviewed on 11/26/2018 02:28 PM by Jewel Chavez    Metformin HCl 500mg Tablets, Extended Release 2 po daily     Nystatin/Triamcinolone 100,000U/1gm/0.1% Cream Apply thin film to affected area bid     Pantoprazole 40mg Tablets, Delayed Release Take 1 tablet(s) by mouth daily     Synthroid 0.075mg Tablet Take 1 tablet(s) by mouth daily     Accu-Chek Lana Plus Test Strips  Reagent Strips check blood sugar once daily  DXE11.9     Accu-Chek Softclix  Lancet Check blood sugar 1 x per day. DX: E11.9     Atorvastatin Calcium 40mg Tablet Take 1 tablet(s) by mouth daily     Potassium Chloride 20mEq Tablets, Extended Release Take 1 tablet(s) by mouth bid     CoQ10 daily     Fish Oil 1,000mg Softgel capsule 1 / day     Vitamin C     Vitamin D3     Vitamin E 1,000IU Capsules 1 capsule daily     Metoprolol Succinate 25mg Tablets, Extended Release 1 tab PO daily         OBJECTIVE:        Vitals:         Current: 11/26/2018 2:07:38 PM    Ht:  5 ft, 6 in;  Wt: 165.2 lbs;  BMI: 26.7    T: 97.3 F (oral);  BP: 164/52 mm Hg (left arm, sitting);  P: 64 bpm (left arm (BP Cuff), sitting);  sCr: 0.67 mg/dL;  GFR: 71.59        Exams:     PHYSICAL EXAM:     GENERAL: vital signs recorded - well developed, well nourished;  no apparent distress;     EYES: extraocular movements intact;  conjunctiva and cornea are normal; PERRLA;     E/N/T: EARS:  normal external auditory canals and tympanic membranes;  grossly normal hearing; NOSE: right maxillary sinus tenderness present;     NECK: range of motion is normal; thyroid is non-palpable;     RESPIRATORY: normal respiratory rate and pattern with no distress; normal breath sounds with no rales, rhonchi, wheezes or rubs;     CARDIOVASCULAR: normal rate; rhythm is regular;  no systolic murmur; no edema;     GASTROINTESTINAL: nontender; normal bowel sounds; no organomegaly;     BREAST/INTEGUMENT: SKIN: no significant rashes or lesions; no suspicious moles;         ASSESSMENT           461.0   J01.01  Acute maxillary sinusitis              DDx:     250.00   E11.9  Type 2 diabetes              DDx:     244.8   E03.8  Acquired hypothyroidism              DDx:     272.0   E78.2  Hypercholesterolemia              DDx:         ORDERS:         Meds Prescribed:       Refill of: Metformin HCl 500mg Tablets, Extended Release Take 2 tablet(s) by mouth daily  #180 ([address] [place] [address]) tablet(s) Refills: 1       Refill of: Pantoprazole 40mg Tablets, Delayed Release Take 1 tablet(s) by mouth daily  #90 (Ninety) tablet(s) Refills: 1       Refill of: Synthroid (Levothyroxine Sodium) 0.075mg Tablet Take 1 tablet(s) by mouth daily  #90 (Ninety) tablet(s) Refills: 1       Amoxicillin 500mg Capsules Take 1 capsule(s) by mouth q8h for 10 days  #30 (Thirty) capsule(s) Refills: 0         Radiology/Test Orders:       3014F  Screening mammography results documented and reviewed (PV)1  (In-House)         3017F  Colorectal CA screen results documented and reviewed (PV)  (In-House)           Other Orders:         Calculated BMI above the upper parameter and a follow-up plan was documented in the medical record  (In-House)                   PLAN:          Acute maxillary sinusitis         RECOMMENDATIONS given include: We have again reviewed her care.  I'm going to cover her  as noted below for the sinuses.  If she does not improve, she will return.  No other changes are anticipated.  We will follow from there..  MIPS Vaccines Flu and Pneumonia updated in Shot record     MAMMOGRAM: Done within last 2 years and results in are chart     COLORECTAL CANCER SCREENING: Results are in chart     BMI Elevated - Follow-Up Plan: She was provided education on weight loss strategies           Prescriptions:       Amoxicillin 500mg Capsules Take 1 capsule(s) by mouth q8h for 10 days  #30 (Thirty) capsule(s) Refills: 0           Orders:       3014F  Screening mammography results documented and reviewed (PV)1  (In-House)         3017F  Colorectal CA screen results documented and reviewed (PV)  (In-House)           Calculated BMI above the upper parameter and a follow-up plan was documented in the medical record  (In-House)             Patient Education Handouts:       Sinus Infection (Sinusitis)           Type 2 diabetes           Prescriptions:       Refill of: Metformin HCl 500mg Tablets, Extended Release Take 2 tablet(s) by mouth daily  #180 (One [place] [address]) tablet(s) Refills: 1          Acquired hypothyroidism           Prescriptions:       Refill of: Synthroid (Levothyroxine Sodium) 0.075mg Tablet Take 1 tablet(s) by mouth daily  #90 (Ninety) tablet(s) Refills: 1          Hypercholesterolemia As above.             Other Prescriptions:       Refill of: Pantoprazole 40mg Tablets, Delayed Release Take 1 tablet(s) by mouth daily  #90 (Ninety) tablet(s) Refills: 1         CHARGE CAPTURE           **Please note: ICD descriptions below are intended for billing purposes only and may not represent clinical diagnoses**        Primary Diagnosis:         461.0 Acute maxillary sinusitis            J01.01    Acute recurrent maxillary sinusitis              Orders:          81036   Office/outpatient visit; established patient, level 4  (In-House)             3014F   Screening mammography results  documented and reviewed (PV)1  (In-House)             3017F   Colorectal CA screen results documented and reviewed (PV)  (In-House)                Calculated BMI above the upper parameter and a follow-up plan was documented in the medical record  (In-House)           250.00 Type 2 diabetes            E11.9    Type 2 diabetes mellitus without complications    244.8 Acquired hypothyroidism            E03.8    Other specified hypothyroidism    272.0 Hypercholesterolemia            E78.2    Mixed hyperlipidemia

## 2021-05-18 NOTE — PROGRESS NOTES
Zoila Whipple  1940     Office/Outpatient Visit    Visit Date: Mon, Feb 24, 2020 11:46 am    Provider: Jewel Chavez MD (Assistant: Breann Chandra RN)    Location: [facility]        Electronically signed by Jewel Chavez MD on  02/25/2020 06:31:00 AM                             Subjective:        CC: pneumonia follow up    HPI:       Zoila Dixon is in today for follow up on pneumonia.  Please see her last visit.  She was treated with Levaquin and states that she is seeing improvement.  She does have some mild cough, but she has greater energy.  She denies other acute issue.      ROS:     CONSTITUTIONAL:  Negative for chills and fever.      CARDIOVASCULAR:  Negative for chest pain and palpitations.      RESPIRATORY:  Positive for recent cough.   Negative for dyspnea.      GASTROINTESTINAL:  Negative for abdominal pain, nausea and vomiting.          Past Medical History / Family History / Social History:         Last Reviewed on 2/24/2020 11:55 AM by Jewel Chavez    Past Medical History:                 PAST MEDICAL HISTORY         Hypertension    Aortic Valve Replacement - mechanical     Gastroesophageal Reflux Disease     Hypothyroidism         CURRENT MEDICAL PROVIDERS:    Cardiologist: Sky Bowman    Urologist: Dr. Jensen             ADVANCE DIRECTIVES: Living will - Her children would make decisions for her if needed.          PREVENTIVE HEALTH MAINTENANCE             BONE DENSITY: was last done 6/27/19 with the following abnormality noted-- Osteopenia     COLORECTAL CANCER SCREENING: Up to date (colonoscopy q10y; sigmoidoscopy q5y; Cologuard q3y) was last done 04/2014, Results are in chart; colonoscopy with the following abnormalities noted-- pelvic fixation - limited exam     EYE EXAM: Diabetic Eye Exam during this calendar year and results are in chart was last done 08/2019     MAMMOGRAM: Done within last 2 years and results in are chart was last done 6/12/19 with  normal results         Surgical History:         Appendectomy: at age 24;     Hysterectomy: at age 24; complete;     Hernia Repair: right inguinal;     Laminectomy: lumbar region; 1975;     Aortic Valve Replacement;    Back Surgery;    Skin Cancer Removal; Procedures: colonoscopy /normal heart cath          Family History:         Positive for Myocardial Infarction ( father ).  Father:  at age 49; Cause of death was MI     Mother:  at age 94         Social History:     Occupation: Retired (Prior occupation: [employer])     Marital Status:       Children: 5 children         Tobacco/Alcohol/Supplements:     Last Reviewed on 2020 11:55 AM by Jewel Chavez    Tobacco: She has never smoked.          Alcohol:  Does not drink alcohol and never has.          Substance Abuse History:     Last Reviewed on 2020 11:55 AM by Jewel Chavez    NEGATIVE         Mental Health History:     Last Reviewed on 2020 11:55 AM by Jewel Chavez        Communicable Diseases (eg STDs):     Last Reviewed on 2020 11:55 AM by Jewel Chavez        Current Problems:     Last Reviewed on 2020 11:55 AM by Jewel Chavez    Gastro-esophageal reflux disease without esophagitis    Presence of prosthetic heart valve    Other specified hypothyroidism    Essential (primary) hypertension    Long term (current) use of anticoagulants    Mixed hyperlipidemia    Type 2 diabetes mellitus without complications    Dysuria    Major depressive disorder, single episode, moderate    Urinary tract infection, site not specified    Syncope and collapse    Other fatigue    Impacted cerumen, right ear    Acute upper respiratory infection, unspecified    Pain in left arm    Pain in left hand    Laceration without foreign body of scalp, initial encounter    Diarrhea, unspecified    Pneumonia, unspecified organism        Immunizations:     Havrix -adult dose (HepA) 2018     Havrix -adult dose (HepA) 12/19/2018    Prevnar 13 (Pneumococcal PCV 13) 9/21/2016    Flulaval 9/16/2011    Fluzone pf (3+ years dose) 10/8/2013    Fluzone High-Dose pf (>=65 yr) 10/13/2014    Fluzone High-Dose pf (>=65 yr) 10/26/2015    Fluzone High-Dose pf (>=65 yr) 9/21/2016    Fluzone High-Dose pf (>=65 yr) 9/21/2017    Fluzone High-Dose pf (>=65 yr) 10/5/2018    Fluzone High-Dose pf (>=65 yr) 9/30/2019    PNEUMOVAX 23 (Pneumococcal PPV23) 9/21/2017        Allergies:     Last Reviewed on 2/24/2020 11:55 AM by Jewel Chavez    medrol dose pack: Rash,chills  (Adverse Reaction)    Sulfonamides: Rash     Bactrim:      Niacin (Nicotinic Acid):          Current Medications:     Last Reviewed on 2/24/2020 11:55 AM by Jewel Chavez    Vitamin E 1,000 unit oral capsule [1 capsule daily]    Endur-C with mary jo hips     lisinopriL 10 mg oral tablet [Take 1 tablet(s) by mouth daily]    Synthroid 75 mcg oral tablet [Take 1 tablet(s) by mouth daily]    Pantoprazole 40 mg oral tablet, delayed release (enteric coated) [Take 1 tablet(s) by mouth daily]    Fish Oil 300-1,000 mg oral capsule [1 / day]    Accu-Chek Lana Plus Test Strips  Reagent Strips [check blood sugar once daily  DXE11.9]    Accu-Chek Softclix  Lancet [Check blood sugar 1 x per day. DX: E11.9]    Metoprolol Succinate 25 mg oral Tablet, Extended Release 24 hr [1 tab PO daily]    Cephalexin 250 mg oral capsule [1 capsule daily]    metFORMIN 500 mg oral Tablet, Extended Release 24 hr [Take 2 tablet(s) by mouth daily]    Kids Vitamin D3     CoQ10 daily     Uribel 118-10-40.8-36 mg oral capsule [take 1 bid prn ]    Furosemide 20 mg oral tablet [1 tab daily]    potassium chloride 10 mEq oral capsule, extended release [one BID]    Transderm-Scop 1 mg over 3 days Transdermal Patch,Transdermal 3 day [apply 1 patch by transdermal route to the hairless area behind 1 ear at least 4 hr before effect is required; reapply every 3 days as needed]    nebulizers  kit [use tid with albuterol DX J18.9]    albuterol sulfate 2.5 mg /3 mL (0.083 %) Inhalation Solution for Nebulization [inhale 3 milliliters (2.5 mg) by nebulization route 3 times per day Dx J18.9]        Objective:        Vitals:         Current: 2/24/2020 11:54:18 AM    Ht:  5 ft, 6 in;  Wt: 167.6 lbs;  BMI: 27.1T: 97.5 F (oral);  BP: 118/62 mm Hg (left arm, sitting);  P: 67 bpm (left arm (BP Cuff), sitting);  sCr: 0.8 mg/dL;  GFR: 59.36        Exams:     PHYSICAL EXAM:     GENERAL: vital signs recorded - well developed, well nourished;  no apparent distress;     NECK: range of motion is normal; thyroid is non-palpable;     RESPIRATORY: normal respiratory rate and pattern with no distress; normal breath sounds with no rales, rhonchi, wheezes or rubs;     CARDIOVASCULAR: normal rate; rhythm is regular;  no systolic murmur; no edema;     GASTROINTESTINAL: nontender; normal bowel sounds; no organomegaly;     LYMPHATIC: no enlargement of cervical or facial nodes; no supraclavicular nodes;     BREAST/INTEGUMENT: SKIN: no significant rashes or lesions; no suspicious moles;         Assessment:         J18.9   Pneumonia, unspecified organism       E03.8   Other specified hypothyroidism           ORDERS:         Lab Orders:       [id number]  BDCB2 - [facility] CBC w/o diff  (Send-Out)            [id number]  TSH - [facility] TSH  (Send-Out)                      Plan:         Pneumonia, unspecified organism    LABORATORY:  Labs ordered to be performed today include CBC W/O DIFF.      RECOMMENDATIONS given include: Zoila Dixon looks and sounds much better today.  We will repeat her blood count and thyroid functions while we are at it.  No changes.  I did recommend she continue to use the nebulizer once or twice daily for now.  I expect the cough will gradually resolve..            Orders:       [id number]  BDCB2 - [facility] CBC w/o diff  (Send-Out)              Other specified hypothyroidism    LABORATORY:  Labs ordered to be performed today include TSH.             Orders:       [id number]  TSH - [facility] TSH  (Send-Out)                  Charge Capture:         Primary Diagnosis:     J18.9  Pneumonia, unspecified organism           Orders:      [id number]  Office/outpatient visit; established patient, level 3  (In-House)              E03.8  Other specified hypothyroidism

## 2021-05-18 NOTE — PROGRESS NOTES
Zoila Whipple  1940     Office/Outpatient Visit    Visit Date: Thu, Dec 10, 2020 12:46 pm    Provider: Jewel Chavez MD (Assistant: Breann Chandra RN)    Location: [facility]        Electronically signed by Jewel Chavez MD on  12/11/2020 05:31:55 PM                             Subjective:        CC: diabetes, blood pressure, thyroid, GERD        TELEMEDICINE VISIT:    - Zoila Dixon consented to this telemedicine visit.    - Persons present during the telemedicine consultation include:  Zoila Dixon - patient, Dr. Chavez    - This visit is being conducted over telephone with audio only.    HPI:           Patient presents with type 2 diabetes mellitus without complications.  Current meds include nothing.  Most recent lab results include LDL:  66 (mg/dL) (05/28/2020), Hemoglobin A1c:  6.9 (%) (05/28/2020).            Additionally, she presents with history of essential (primary) hypertension.  her current cardiac medication regimen includes a diuretic ( Demadex ), a beta-blocker ( Toprol-XL ), and an ACE inhibitor ( Zestril ).  She did not bring her blood pressure diary, but says that pressures have been too high.  She is tolerating the medication well without side effects.  Compliance with treatment has been good; she takes her medication as directed and follows up as directed.            She also has history of GERD for which she remains on pantoprazole.  She is tolerating that well and denies acute issue.          With regard to the other specified hypothyroidism, she is currently taking Levothyroid, 100 mcg daily.  TSH was last checked more than 6 months ago.  The result was reported as normal.  She denies any related symptoms.      ROS:     CONSTITUTIONAL:  Negative for chills and fever.      CARDIOVASCULAR:  Negative for chest pain and palpitations.      RESPIRATORY:  Negative for recent cough and dyspnea.      GASTROINTESTINAL:  Negative for abdominal pain, nausea and vomiting.       INTEGUMENTARY/BREAST:  Negative for atypical mole(s) and rash.          Past Medical History / Family History / Social History:         Last Reviewed on 2020 02:30 PM by Jewel Chavez    Past Medical History:                 PAST MEDICAL HISTORY         Hypertension    Aortic Valve Replacement - mechanical     Gastroesophageal Reflux Disease     Hypothyroidism         CURRENT MEDICAL PROVIDERS:    Cardiologist: Sky Bowman    Urologist: Dr. Jensen             ADVANCE DIRECTIVES: Living will - Her children would make decisions for her if needed.          PREVENTIVE HEALTH MAINTENANCE             BONE DENSITY: was last done 19 with the following abnormality noted-- Osteopenia     COLORECTAL CANCER SCREENING: Up to date (colonoscopy q10y; sigmoidoscopy q5y; Cologuard q3y) was last done 2014, Results are in chart; colonoscopy with the following abnormalities noted-- pelvic fixation - limited exam     EYE EXAM: Diabetic Eye Exam during this calendar year and results are in chart was last done 2019     MAMMOGRAM: Done within last 2 years and results in are chart was last done 19 with normal results         Surgical History:         Appendectomy: at age 24;     Hysterectomy: at age 24; complete;     Hernia Repair: right inguinal;     Laminectomy: lumbar region; 1975;     Aortic Valve Replacement;    Back Surgery;    Skin Cancer Removal; Procedures: colonoscopy /normal heart cath          Family History:         Positive for Myocardial Infarction ( father ).  Father:  at age 49; Cause of death was MI     Mother:  at age 94         Social History:     Occupation: Retired (Prior occupation: [employer])     Marital Status:       Children: 5 children         Tobacco/Alcohol/Supplements:     Last Reviewed on 2020 02:30 PM by Jewel Chavez    Tobacco: She has never smoked.          Alcohol:  Does not drink alcohol and never has.          Substance  Abuse History:     Last Reviewed on 5/28/2020 02:30 PM by Jewel Chavez    NEGATIVE         Mental Health History:     Last Reviewed on 5/28/2020 02:30 PM by Jewel Chavez        Communicable Diseases (eg STDs):     Last Reviewed on 5/28/2020 02:30 PM by Jewel Chavez        Current Problems:     Last Reviewed on 5/28/2020 02:30 PM by Jewel Chavez    Gastro-esophageal reflux disease without esophagitis    Presence of prosthetic heart valve    Other specified hypothyroidism    Essential (primary) hypertension    Long term (current) use of anticoagulants    Mixed hyperlipidemia    Type 2 diabetes mellitus without complications    Dysuria    Major depressive disorder, single episode, moderate    Urinary tract infection, site not specified    Syncope and collapse    Other fatigue    Impacted cerumen, right ear    Acute upper respiratory infection, unspecified    Pain in left arm    Pain in left hand    Laceration without foreign body of scalp, initial encounter    Diarrhea, unspecified    Pneumonia, unspecified organism    Right upper quadrant pain    Localized edema    Encounter for other screening for malignant neoplasm of breast    Encounter for immunization        Immunizations:     influenza, high-dose, quadrivalent (FLUZONE HIGH-DOSE QUAD 2020-21) 9/29/2020    Havrix -adult dose (HepA) 6/18/2018    Havrix -adult dose (HepA) 12/19/2018    Prevnar 13 (Pneumococcal PCV 13) 9/21/2016    Flulaval 9/16/2011    Fluzone pf (3+ years dose) 10/8/2013    Fluzone High-Dose pf (>=65 yr) 10/13/2014    Fluzone High-Dose pf (>=65 yr) 10/26/2015    Fluzone High-Dose pf (>=65 yr) 9/21/2016    Fluzone High-Dose pf (>=65 yr) 9/21/2017    Fluzone High-Dose pf (>=65 yr) 10/5/2018    Fluzone High-Dose pf (>=65 yr) 9/30/2019    PNEUMOVAX 23 (Pneumococcal PPV23) 9/21/2017        Allergies:     Last Reviewed on 5/28/2020 02:30 PM by Jewel Chavez    medrol dose pack: Rash,chills  (Adverse  Reaction)    Sulfonamides: Rash     Bactrim:      Niacin (Nicotinic Acid):          Current Medications:     Last Reviewed on 5/28/2020 02:30 PM by Jewel Chavez    torsemide 20 mg oral tablet [take 1 tablet (20 mg) by oral route once daily]    Vitamin E 1,000 unit oral capsule [1 capsule daily]    Endur-C with mary jo hips     lisinopriL 10 mg oral tablet [TAKE 1 TABLET EVERY DAY]    pantoprazole 40 mg oral tablet, delayed release (enteric coated) [TAKE 1 TABLET EVERY DAY]    Fish Oil 300-1,000 mg oral capsule [1 / day]    nystatin-triamcinolone 100,000-0.1 unit/g-% Topical Cream [APPLY A THIN FILM TO AFFECTED AREA TWO TIMES A DAY]    Accu-Chek Lana Plus Test Strips  Reagent Strips [check blood sugar once daily  DXE11.9]    Accu-Chek Softclix  Lancet [Check blood sugar 1 x per day. DX: E11.9]    Metoprolol Succinate 25 mg oral Tablet, Extended Release 24 hr [1 tab PO daily]    Cephalexin 250 mg oral capsule [1 capsule daily]    metFORMIN 500 mg oral Tablet, Extended Release 24 hr [Take 2 tablet(s) by mouth daily]    Kids Vitamin D3     CoQ10 daily     Uribel 118-10-40.8-36 mg oral capsule [take 1 bid prn ]    potassium chloride 10 mEq oral capsule, extended release [one BID]    Transderm-Scop 1 mg over 3 days Transdermal Patch,Transdermal 3 day [apply 1 patch by transdermal route to the hairless area behind 1 ear at least 4 hr before effect is required; reapply every 3 days as needed]    nebulizers kit  [use tid with albuterol DX J18.9]    albuterol sulfate 2.5 mg /3 mL (0.083 %) Inhalation Solution for Nebulization [inhale 3 milliliters (2.5 mg) by nebulization route 3 times per day Dx J18.9]    levothyroxine 100 mcg oral tablet [TAKE 1 TABLET EVERY DAY]        Assessment:         E11.9   Type 2 diabetes mellitus without complications       I10   Essential (primary) hypertension       K21.9   Gastro-esophageal reflux disease without esophagitis       E78.2   Mixed hyperlipidemia       E03.8   Other  specified hypothyroidism       R53.83   Other fatigue           ORDERS:         Meds Prescribed:       [Refilled] lisinopriL 10 mg oral tablet [TAKE 1 TABLET EVERY DAY], #90 (ninety) tablets, Refills: 1 (one)       [Refilled] pantoprazole 40 mg oral tablet, delayed release (enteric coated) [TAKE 1 TABLET EVERY DAY], #90 (ninety) tablets, Refills: 1 (one)       [Refilled] levothyroxine 100 mcg oral tablet [TAKE 1 TABLET EVERY DAY], #90 (ninety) tablets, Refills: 1 (one)         Radiology/Test Orders:       [id number]  Colorectal CA screen results documented and reviewed (PV)  (In-House)              Lab Orders:       [id number]  COMP - [facility] Comp. Metabolic Panel  (Send-Out)            80845  A1CEG - [facility] Hemoglobin A1C  (Send-Out)            04679  TSH - [facility] TSH  (Send-Out)            84450  BDCB2 - [facility] CBC w/o diff  (Send-Out)              Other Orders:         Screening mammogram results documented  (Send-Out)            1123F  Advance Care Planning discussed and doc; advance care plan or surrogate decision maker doc. in MR  (Send-Out)                      Plan:         Type 2 diabetes mellitus without complications    LABORATORY:  Labs ordered to be performed today include Comprehensive metabolic panel and HgbA1C.      RECOMMENDATIONS given include: Today, we have refilled Zoila's medications.  We will update labs on her as noted.  Consider statin therapy.  She may need treatment for diabetes.  We will see what her labs show and follow from there..  MIPS Vaccines Flu and Pneumonia updated in Shot record Screening mammomgram done within last 2 years and results in are chart Colorectal Cancer Screening is up to date and the results are in the chart ACP discussion: Advance Directive/Surrogate Decision Maker discussed and scanned into chart Total time spent was 11 minutes; [id number]--[id number] E/M 11-20 minutes           Prescriptions:       [Refilled] lisinopriL 10 mg oral tablet [TAKE 1 TABLET EVERY DAY], #90 (ninety) tablets,  Refills: 1 (one)       [Refilled] pantoprazole 40 mg oral tablet, delayed release (enteric coated) [TAKE 1 TABLET EVERY DAY], #90 (ninety) tablets, Refills: 1 (one)       [Refilled] levothyroxine 100 mcg oral tablet [TAKE 1 TABLET EVERY DAY], #90 (ninety) tablets, Refills: 1 (one)           Orders:       [id number]  COMP - [facility] Comp. Metabolic Panel  (Send-Out)            63005  A1CEG - [facility] Hemoglobin A1C  (Send-Out)              Screening mammogram results documented  (Send-Out)            3017F  Colorectal CA screen results documented and reviewed (PV)  (In-House)            1123F  Advance Care Planning discussed and doc; advance care plan or surrogate decision maker doc. in MR  (Send-Out)              Gastro-esophageal reflux disease without esophagitisAs above.        Mixed hyperlipidemiaAs above.        Other specified hypothyroidism    LABORATORY:  Labs ordered to be performed today include TSH.            Orders:       [id number]  TSH - [facility] TSH  (Send-Out)              Other fatigue    LABORATORY:  Labs ordered to be performed today include CBC W/O DIFF.            Orders:       [id number]  BDCB2 - [facility] CBC w/o diff  (Send-Out)                  Charge Capture:         Primary Diagnosis:     E11.9  Type 2 diabetes mellitus without complications           Orders:      [id number]  Colorectal CA screen results documented and reviewed (PV)  (In-House)            08701  Phys/QHP telephone evaluation 11-20 minutes  (In-House)              I10  Essential (primary) hypertension     K21.9  Gastro-esophageal reflux disease without esophagitis     E78.2  Mixed hyperlipidemia     E03.8  Other specified hypothyroidism     R53.83  Other fatigue

## 2021-05-18 NOTE — PROGRESS NOTES
Zoila Whipple 1940     Office/Outpatient Visit    Visit Date: Thu, Jul 19, 2018 03:36 pm    Provider: Jewel Chavez MD (Assistant: Aziza Whipple MA)    Location: [facility]        Electronically signed by Jewel Chavez MD on  07/20/2018 05:37:45 PM                             SUBJECTIVE:        CC: UTI, medication refills         HPI:         Zoila Dixon presents with dysuria.  This began within the last 3 days.  Associated symptoms include urgency and urinary frequency.  Zoila Dixon states that she had multiple prior episodes of similar discomfort.          Type 2 diabetes details; current meds include an oral hypoglycemic ( Glucophage XR ).  Most recent lab results include LDL:  22 (mg/dL) (01/22/2018), Hemoglobin A1c:  7.4 (%) (09/11/2017),  5.9 (%) (01/22/2018).          Acquired hypothyroidism details; she is currently taking Levothyroid, 75 mcg daily.  TSH was last checked 6 months ago.  The result was reported as normal.  She denies any related symptoms.          In regard to the hypercholesterolemia, current treatment includes Lipitor.  Compliance with treatment has been good; she takes her medication as directed and follows up as directed.  She denies experiencing any hypercholesterolemia related symptoms.      ROS:     CONSTITUTIONAL:  Negative for chills and fever.      CARDIOVASCULAR:  Negative for chest pain and palpitations.      RESPIRATORY:  Negative for recent cough and dyspnea.      GASTROINTESTINAL:  Negative for abdominal pain, nausea and vomiting.          PMH/FMH/SH:     Last Reviewed on 2/15/2018 10:54 AM by Jewel Chavez    Past Medical History:                 PAST MEDICAL HISTORY         Hypertension    Aortic Valve Replacement - mechanical     Gastroesophageal Reflux Disease     Hypothyroidism         CURRENT MEDICAL PROVIDERS:    Cardiologist: Sky Bowman    Urologist: Dr. Jensen         PREVENTIVE HEALTH MAINTENANCE             BONE DENSITY: was last done  2017 with the following abnormality noted-- osteopenia     COLORECTAL CANCER SCREENING: Up to date (colonoscopy q10y; sigmoidoscopy q5y; Cologuard q3y) was last done 2014, Results are in chart; colonoscopy with the following abnormalities noted-- pelvic fixation - limited exam     MAMMOGRAM: Done within last 2 years and results in are chart was last done 18 with normal results         Surgical History:         Appendectomy: at age 24;     Hysterectomy: at age 24; complete;      Hernia Repair: right inguinal;     Laminectomy: lumbar region; 1975;     Aortic Valve Replacement;    Back Surgery;    Skin Cancer Removal; Procedures: colonoscopy /normal heart cath          Family History:         Positive for Myocardial Infarction ( father ).  Father:  at age 49; Cause of death was MI     Mother:  at age 94         Social History:     Occupation: Retired (Prior occupation: [employer])     Marital Status:      Children: 5 children         Tobacco/Alcohol/Supplements:     Last Reviewed on 2018 03:39 PM by Aziza Whippel    Tobacco: She has never smoked.          Alcohol:  Does not drink alcohol and never has.          Substance Abuse History:     Last Reviewed on 2/15/2018 10:54 AM by Jewel Chavez    NEGATIVE         Mental Health History:     Last Reviewed on 2/15/2018 10:54 AM by Jewel Chavez        Communicable Diseases (eg STDs):     Last Reviewed on 2/15/2018 10:54 AM by Jewel Chavez            Current Problems:     Last Reviewed on 2/15/2018 10:54 AM by Jewel Chavez    Moderate depression     Type 2 diabetes     Hypercholesterolemia     Use of high risk medications     Hypertension     Acquired hypothyroidism     Heart valve replacement patient     GERD     Dysuria         Immunizations:     Prevnar 13 (Pneumococcal PCV 13) 2016     Flulaval 2011     Fluzone pf (3+ years dose) 10/8/2013     Fluzone High-Dose pf (>=65 yr)  10/13/2014     Fluzone High-Dose pf (>=65 yr) 10/26/2015     Fluzone High-Dose pf (>=65 yr) 9/21/2016     Fluzone High-Dose pf (>=65 yr) 9/21/2017     PNEUMOVAX 23 (Pneumococcal PPV23) 9/21/2017         Allergies:     Last Reviewed on 7/19/2018 03:37 PM by Aziza Whipple    Sulfonamides: rash    Niacin (Nicotinic Acid):        Current Medications:     Last Reviewed on 7/19/2018 03:37 PM by Aziza Whipple    Pantoprazole 40mg Tablets, Delayed Release Take 1 tablet(s) by mouth daily     Synthroid 0.075mg Tablet Take 1 tablet(s) by mouth daily     Metformin HCl 500mg Tablets, Extended Release 2 po daily     Accu-Chek Lana Plus Test Strips  Reagent Strips check blood sugar once daily  DXE11.9     Accu-Chek Softclix  Lancet Check blood sugar 1 x per day. DX: E11.9     Metoprolol Succinate 25mg Tablets, Extended Release     Atorvastatin Calcium 40mg Tablet Take 1 tablet(s) by mouth daily     Potassium Chloride 20mEq Tablets, Extended Release Take 1 tablet(s) by mouth bid     Lexapro 10mg Tablet Take 1 tablet(s) by mouth daily         OBJECTIVE:        Vitals:         Current: 7/19/2018 3:38:29 PM    Ht:  5 ft, 6 in;  Wt: 159.8 lbs;  BMI: 25.8    T: 96 F (oral);  BP: 148/67 mm Hg (left arm, sitting);  P: 68 bpm (left arm (BP Cuff), sitting);  sCr: 0.65 mg/dL;  GFR: 73.93        Exams:     PHYSICAL EXAM:     GENERAL: vital signs recorded - well developed, well nourished;  no apparent distress;     EYES: extraocular movements intact; conjunctiva and cornea are normal; PERRLA;     E/N/T:  normal EACs, TMs, nasal/oral mucosa, teeth, gingiva, and oropharynx;     NECK: range of motion is normal; thyroid is non-palpable;     RESPIRATORY: normal respiratory rate and pattern with no distress; normal breath sounds with no rales, rhonchi, wheezes or rubs;     CARDIOVASCULAR: normal rate; rhythm is regular;  no systolic murmur; no edema;     GASTROINTESTINAL: nontender; normal bowel sounds; no organomegaly;     BREAST/INTEGUMENT:  SKIN: no significant rashes or lesions; no suspicious moles;         ASSESSMENT           788.1   R30.0  Dysuria              DDx:     250.00   E11.9  Type 2 diabetes              DDx:     244.8   E03.8  Acquired hypothyroidism              DDx:     272.0   E78.4  Hypercholesterolemia              DDx:         ORDERS:         Meds Prescribed:       Refill of: Pantoprazole 40mg Tablets, Delayed Release Take 1 tablet(s) by mouth daily  #90 (Ninety) tablet(s) Refills: 1       Refill of: Synthroid (Levothyroxine Sodium) 0.075mg Tablet Take 1 tablet(s) by mouth daily  #90 (Ninety) tablet(s) Refills: 1       Ciprofloxacin HCl 500mg Tablet Take 1 tablet(s) by mouth q12h for 10 days  #20 (Twenty) tablet(s) Refills: 0       Nystatin/Triamcinolone 100,000U/1gm/0.1% Cream Apply thin film to affected area bid  #60 (Sixty) gm Refills: 2         Lab Orders:       [id number]  BDUAM - [facility] Urinalysis, automated, with micro  (Send-Out)         [id number]  TSH - [facility] TSH  (Send-Out)         29724  CK - [facility]- CK total  (Send-Out)  (CC DR. MAJOR)       50163  COMP - [facility] Comp. Metabolic Panel  (Send-Out)         56992  A1CEG - [facility] Hemoglobin A1C  (Send-Out)         72468  MG - [facility] Magnesium, Serum  (Send-Out)                   PLAN:          Dysuria         RECOMMENDATIONS given include: Zoila Dixon has significant UTI again.  We will cover for this as noted below pending culture.  Risk of low sugars with cipro discussed.  We will contact her about this and the labs when the culture comes back.  No other changes are anticipated..            Prescriptions:       Ciprofloxacin HCl 500mg Tablet Take 1 tablet(s) by mouth q12h for 10 days  #20 (Twenty) tablet(s) Refills: 0           Orders:       [id number]  BDUAM - [facility] Urinalysis, automated, with micro  (Send-Out)             Patient Education Handouts:       Urinary Tract Infection (UTI)           Type 2 diabetes     LABORATORY:  Labs ordered to be performed today include CK, total, Comprehensive metabolic  panel, HgbA1C, and Magnesium level.            Orders:       [id number]  CK - H- CK total  (Send-Out)  (CC DR. MAJOR)       [id number]  COMP - [facility] Comp. Metabolic Panel  (Send-Out)         02155  A1CEG - H Hemoglobin A1C  (Send-Out)         07262  MG - [facility] Magnesium, Serum  (Send-Out)            Acquired hypothyroidism     LABORATORY:  Labs ordered to be performed today include TSH.            Prescriptions:       Refill of: Synthroid (Levothyroxine Sodium) 0.075mg Tablet Take 1 tablet(s) by mouth daily  #90 (Ninety) tablet(s) Refills: 1           Orders:       [id number]  TSH - [facility] TSH  (Send-Out)            Hypercholesterolemia As above.             Other Prescriptions:       Refill of: Pantoprazole 40mg Tablets, Delayed Release Take 1 tablet(s) by mouth daily  #90 (Ninety) tablet(s) Refills: 1       Nystatin/Triamcinolone 100,000U/1gm/0.1% Cream Apply thin film to affected area bid  #60 (Sixty) gm Refills: 2         CHARGE CAPTURE           **Please note: ICD descriptions below are intended for billing purposes only and may not represent clinical diagnoses**        Primary Diagnosis:         788.1 Dysuria            R30.0    Dysuria              Orders:          [id number]   Office/outpatient visit; established patient, level 4  (In-House)           250.00 Type 2 diabetes            E11.9    Type 2 diabetes mellitus without complications    244.8 Acquired hypothyroidism            E03.8    Other specified hypothyroidism    272.0 Hypercholesterolemia            E78.4    Other hyperlipidemia

## 2021-05-18 NOTE — PROGRESS NOTES
Zoila Whipple  1940     Office/Outpatient Visit    Visit Date: Tue, Jan 7, 2020 09:29 am    Provider: Jewel Chavez MD (Assistant: Breann Chandra RN)    Location: [facility]        Electronically signed by Jewel Chavez MD on  01/08/2020 09:08:57 AM                             Subjective:        CC: diarrhea    HPI:       Zoila Dixon is concerned about her bowels.  She has had diarrhea for the last 6 months.  She says that she will have this at least two days per week.  She will run to the bathroom all day with loose stool and sometimes will lose control of her bowels.  She does have cramping when this is happening.  She has her gall bladder.  She does take cephalexin on a regular basis to help decrease the frequency of bladder infections, but this is not new.  She is not on probiotic at this time.  She will have normal formed stools on some days though.  She is not sure what is happening.  She is not aware of obvious triggers that might cause this, but she is not sure.  She did have colonoscopy about 6 years ago, but she does not want to do that again due to difficulty with the last one.          In regard to the other specified hypothyroidism, she is currently taking Levothyroid, 75 mcg daily.  TSH was last checked several months ago.  The result was reported as normal.  She denies any related symptoms.            With regard to the essential (primary) hypertension, her current cardiac medication regimen includes a diuretic ( Lasix ), a beta-blocker ( Toprol-XL ), and an ACE inhibitor ( Zestril ).  She did not bring her blood pressure diary, but says that pressures have been too high.  She is tolerating the medication well without side effects.  Compliance with treatment has been good; she takes her medication as directed and follows up as directed.            In regard to the mixed hyperlipidemia, current treatment includes a low cholesterol/low fat diet.  Compliance with treatment  has been good; she maintains her low cholesterol diet and follows up as directed.            Concerning type 2 diabetes mellitus without complications, current meds include an oral hypoglycemic ( Glucophage XR ).  Most recent lab results include LDL:  72 (mg/dL) (07/29/2019), Hemoglobin A1c:  6.3 (%) (07/29/2019).      ROS:     CONSTITUTIONAL:  Negative for chills and fever.      CARDIOVASCULAR:  Negative for chest pain and palpitations.      RESPIRATORY:  Negative for recent cough and dyspnea.      GASTROINTESTINAL:  Negative for abdominal pain, nausea and vomiting.      INTEGUMENTARY/BREAST:  Negative for atypical mole(s) and rash.          Past Medical History / Family History / Social History:         Last Reviewed on 1/07/2020 09:55 AM by Jewel Chavez    Past Medical History:                 PAST MEDICAL HISTORY         Hypertension    Aortic Valve Replacement - mechanical     Gastroesophageal Reflux Disease     Hypothyroidism         CURRENT MEDICAL PROVIDERS:    Cardiologist: Sky Bowman    Urologist: Dr. Jensen             ADVANCE DIRECTIVES: Living will - Her children would make decisions for her if needed.          PREVENTIVE HEALTH MAINTENANCE             BONE DENSITY: was last done 6/27/19 with the following abnormality noted-- Osteopenia     COLORECTAL CANCER SCREENING: Up to date (colonoscopy q10y; sigmoidoscopy q5y; Cologuard q3y) was last done 04/2014, Results are in chart; colonoscopy with the following abnormalities noted-- pelvic fixation - limited exam     EYE EXAM: Diabetic Eye Exam during this calendar year and results are in chart was last done 08/2018     MAMMOGRAM: Done within last 2 years and results in are chart was last done 6/12/19 with normal results         Surgical History:         Appendectomy: at age 24;     Hysterectomy: at age 24; complete;     Hernia Repair: right inguinal;     Laminectomy: lumbar region; 1975;     Aortic Valve Replacement;    Back Surgery;    Skin  Cancer Removal; Procedures: colonoscopy /normal heart cath          Family History:         Positive for Myocardial Infarction ( father ).  Father:  at age 49; Cause of death was MI     Mother:  at age 94         Social History:     Occupation: Retired (Prior occupation: [employer])     Marital Status:       Children: 5 children         Tobacco/Alcohol/Supplements:     Last Reviewed on 2020 09:55 AM by Jewel Chavez    Tobacco: She has never smoked.          Alcohol:  Does not drink alcohol and never has.          Substance Abuse History:     Last Reviewed on 2020 09:55 AM by Jewel Chavez    NEGATIVE         Mental Health History:     Last Reviewed on 2020 09:55 AM by Jewel Chavez        Communicable Diseases (eg STDs):     Last Reviewed on 2020 09:55 AM by Jewel Chavez        Current Problems:     Last Reviewed on 2020 09:55 AM by Jewel Chavez    Gastro-esophageal reflux disease without esophagitis    Presence of prosthetic heart valve    Other specified hypothyroidism    Essential (primary) hypertension    Long term (current) use of anticoagulants    Mixed hyperlipidemia    Type 2 diabetes mellitus without complications    Dysuria    Major depressive disorder, single episode, moderate    Urinary tract infection, site not specified    Syncope and collapse    Other fatigue    Impacted cerumen, right ear    Acute upper respiratory infection, unspecified    Pain in left arm    Pain in left hand    Laceration without foreign body of scalp, initial encounter    Diarrhea, unspecified        Immunizations:     Havrix -adult dose (HepA) 2018    Havrix -adult dose (HepA) 2018    Prevnar 13 (Pneumococcal PCV 13) 2016    Flulaval 2011    Fluzone pf (3+ years dose) 10/8/2013    Fluzone High-Dose pf (>=65 yr) 10/13/2014    Fluzone High-Dose pf (>=65 yr) 10/26/2015    Fluzone High-Dose pf (>=65 yr)  9/21/2016    Fluzone High-Dose pf (>=65 yr) 9/21/2017    Fluzone High-Dose pf (>=65 yr) 10/5/2018    Fluzone High-Dose pf (>=65 yr) 9/30/2019    PNEUMOVAX 23 (Pneumococcal PPV23) 9/21/2017        Allergies:     Last Reviewed on 1/07/2020 09:55 AM by Jewel Chavez    medrol dose pack: Rash,chills  (Adverse Reaction)    Sulfonamides: Rash     Bactrim:      Niacin (Nicotinic Acid):          Current Medications:     Last Reviewed on 1/07/2020 09:55 AM by Jewel Chavez    Vitamin C     Vitamin E 1,000IU Capsules [1 capsule daily]    Lisinopril 10mg Tablet [Take 1 tablet(s) by mouth daily]    Synthroid 0.075mg Tablet [Take 1 tablet(s) by mouth daily]    Pantoprazole 40mg Tablets, Delayed Release [Take 1 tablet(s) by mouth daily]    Fish Oil 1,000mg Softgel capsule [1 / day]    Atorvastatin Calcium 20mg Tablet [1 tab daily]    Accu-Chek Lana Plus Test Strips  Reagent Strips [check blood sugar once daily  DXE11.9]    Accu-Chek Softclix  Lancet [Check blood sugar 1 x per day. DX: E11.9]    Metoprolol Succinate 25mg Tablets, Extended Release [1 tab PO daily]    Cephalexin 250mg Capsules [1 capsule daily]    Metformin HCl 500mg Tablets, Extended Release [Take 2 tablet(s) by mouth daily]    Vitamin D3     CoQ10 daily    Uribel 118mg/40.8mg/36mg/10mg/0.12mg Capsules [take 1 bid prn ]    Furosemide 20mg Tablets [1 tab daily]    potassium chloride 10 mEq oral capsule, extended release [one BID]        Objective:        Vitals:         Current: 1/7/2020 9:38:30 AM    Ht:  5 ft, 6 in;  Wt: 168.8 lbs;  BMI: 27.2T: 98.1 F (oral);  BP: 120/60 mm Hg (left arm, sitting);  P: 64 bpm (left arm (BP Cuff), sitting);  sCr: 0.78 mg/dL;  GFR: 61.07        Exams:     PHYSICAL EXAM:     GENERAL: vital signs recorded - well developed, well nourished;  no apparent distress;     EYES: extraocular movements intact; conjunctiva and cornea are normal; PERRL;     NECK: range of motion is normal; thyroid is non-palpable;      RESPIRATORY: normal respiratory rate and pattern with no distress; normal breath sounds with no rales, rhonchi, wheezes or rubs;     CARDIOVASCULAR: normal rate; rhythm is regular;  no systolic murmur; no edema;     GASTROINTESTINAL: nontender; normal bowel sounds; no organomegaly;     LYMPHATIC: no enlargement of cervical or facial nodes; no supraclavicular nodes;     BREAST/INTEGUMENT: SKIN: no significant rashes or lesions; no suspicious moles;     NEUROLOGIC: mental status: alert and oriented x 3; cranial nerves II-XII grossly intact;     PSYCHIATRIC: appropriate affect and demeanor; normal psychomotor function;     Foot exam performed.      Left foot exam    Protective sensation using Monofilament test: NORMAL sensation. Patient detects .07 grams of force which is considered normal.    Vascular status: normal peripheral vascular exam with palpable dorsal pedal and posterior tibal pulses and brisk digital capillary refill    Skin is intact without sores or ulcers    Right foot exam    Protective sensation using Monofilament test: NORMAL sensation. Patient detects .07 grams of force which is considered normal.    Vascular status: normal peripheral vascular exam with palpable dorsal pedal and posterior tibal pulses and brisk digital capillary refill    Skin is intact without sores or ulcers         Assessment:         R19.7   Diarrhea, unspecified       Z95.2   Presence of prosthetic heart valve       E03.8   Other specified hypothyroidism       I10   Essential (primary) hypertension       E78.2   Mixed hyperlipidemia       E11.9   Type 2 diabetes mellitus without complications           ORDERS:         Meds Prescribed:       [Refilled] Pantoprazole 40 mg oral tablet, delayed release (enteric coated) [Take 1 tablet(s) by mouth daily], #90 (ninety) tablets, Refills: 1 (one)       [Refilled] Synthroid 75 mcg oral tablet [Take 1 tablet(s) by mouth daily], #90 (ninety) tablets, Refills: 1 (one)       [Refilled]  lisinopriL 10 mg oral tablet [Take 1 tablet(s) by mouth daily], #90 (ninety) tablets, Refills: 1 (one)       [New Rx] Transderm-Scop 1 mg over 3 days Transdermal Patch,Transdermal 3 day [apply 1 patch by transdermal route to the hairless area behind 1 ear at least 4 hr before effect is required; reapply every 3 days as needed], #4 (four) patches, Refills: 0 (zero)         Radiology/Test Orders:       3017F  Colorectal CA screen results documented and reviewed (PV)  (In-House)            2022F  Dilated retinal eye exam w/interpret by ophthalmologist/optometrist documented/reviewed (DM)4  (In-House)              Lab Orders:       12664  COMP - [facility] Comp. Metabolic Panel  (Send-Out)            13459  A1CEG - [facility] Hemoglobin A1C  (Send-Out)            06724  TSH - H TSH  (Send-Out)            66467  BDCB2 - [facility] CBC w/o diff  (Send-Out)            95651  SED - [facility] Sedimentation rate, non-automated ESR  (Send-Out)            58653  CDTEX - [facility] Clostridium Difficile Toxins A & B; dna probe  (Send-Out)            41572  STLC - [facility] Stool Culture  (Send-Out)            99419  LACFE - [facility] White Blood Cells (WBC), stool  (Send-Out)            35938  GIAR - [facility] Ova and parasite smear, stool  (Send-Out)              Other Orders:       2028F  Foot examination performed (includes examination through visual inspection, sensory exam with monofilament, and pulse exam - report when any of the three components are completed) (DM)4  (In-House)              Depression screen negative  (In-House)            1101F  Pt screen for fall risk; document no falls in past year or only 1 fall w/o injury in past year (FREDIS)  (In-House)              Screening mammogram results documented  (In-House)                      Plan:         Diarrhea, unspecified    LABORATORY:  Labs ordered to be performed today include CBC W/O DIFF, Diarrhea testing C diff Stool culture WBC Stool, and ESR.      RECOMMENDATIONS given include: Today, we will  evaluate Zoila Dixon as noted below.  Consider additional evaluation.  Consider stopping metformin - use of probiotic.  We will be back in touch with her..  MIPS PHQ-9 Depression Screening: Completed form scanned and in chart; Total Score 0; Negative Depression Screen           Orders:       87383  BDCB2 - [facility] CBC w/o diff  (Send-Out)            38044  SED - [facility] Sedimentation rate, non-automated ESR  (Send-Out)            34938  CDTEX - [facility] Clostridium Difficile Toxins A & B; dna probe  (Send-Out)            87817  STLC - [facility] Stool Culture  (Send-Out)            61232  LACFE - [facility] White Blood Cells (WBC), stool  (Send-Out)            86703  GIAR - [facility] Ova and parasite smear, stool  (Send-Out)              Depression screen negative  (In-House)            1101F  Pt screen for fall risk; document no falls in past year or only 1 fall w/o injury in past year (FREDIS)  (In-House)              Screening mammogram results documented  (In-House)            3017F  Colorectal CA screen results documented and reviewed (PV)  (In-House)            2022F  Dilated retinal eye exam w/interpret by ophthalmologist/optometrist documented/reviewed (DM)4  (In-House)              Presence of prosthetic heart valveAs above.        Other specified hypothyroidism    LABORATORY:  Labs ordered to be performed today include TSH.            Prescriptions:       [Refilled] Synthroid 75 mcg oral tablet [Take 1 tablet(s) by mouth daily], #90 (ninety) tablets, Refills: 1 (one)           Orders:       10738  TSH - [facility] TSH  (Send-Out)              Essential (primary) hypertension          Prescriptions:       [Refilled] lisinopriL 10 mg oral tablet [Take 1 tablet(s) by mouth daily], #90 (ninety) tablets, Refills: 1 (one)         Mixed hyperlipidemiaAs above.        Type 2 diabetes mellitus without complications    LABORATORY:  Labs ordered to be performed today include Comprehensive metabolic panel and HgbA1C.            Orders:       2028F  Foot  examination performed (includes examination through visual inspection, sensory exam with monofilament, and pulse exam - report when any of the three components are completed) (DM)4  (In-House)            67620  COMP [facility] Comp. Metabolic Panel  (Send-Out)            38762  A1CEG [facility] Hemoglobin A1C  (Send-Out)                  Other Prescriptions:       [Refilled] Pantoprazole 40 mg oral tablet, delayed release (enteric coated) [Take 1 tablet(s) by mouth daily], #90 (ninety) tablets, Refills: 1 (one)       [New Rx] Transderm-Scop 1 mg over 3 days Transdermal Patch,Transdermal 3 day [apply 1 patch by transdermal route to the hairless area behind 1 ear at least 4 hr before effect is required; reapply every 3 days as needed], #4 (four) patches, Refills: 0 (zero)         Charge Capture:         Primary Diagnosis:     R19.7  Diarrhea, unspecified           Orders:      16662  Office/outpatient visit; established patient, level 4  (In-House)              Depression screen negative  (In-House)            1101F  Pt screen for fall risk; document no falls in past year or only 1 fall w/o injury in past year (FREDIS)  (In-House)              Screening mammogram results documented  (In-House)            3017F  Colorectal CA screen results documented and reviewed (PV)  (In-House)            2022F  Dilated retinal eye exam w/interpret by ophthalmologist/optometrist documented/reviewed (DM)4  (In-House)              Z95.2  Presence of prosthetic heart valve     E03.8  Other specified hypothyroidism     I10  Essential (primary) hypertension     E78.2  Mixed hyperlipidemia     E11.9  Type 2 diabetes mellitus without complications           Orders:      2028F  Foot examination performed (includes examination through visual inspection, sensory exam with monofilament, and pulse exam - report when any of the three components are completed) (DM)4  (In-House)

## 2021-05-18 NOTE — PROGRESS NOTES
Sarabjit Zoila BOO 1940     Office/Outpatient Visit    Visit Date: Fri, Oct 5, 2018 10:52 am    Provider: Ely Joseph N.P. (Assistant: Carlyn Shi MA)    Location: [facility]        Electronically signed by Ely Joseph N.P. on  10/05/2018 01:09:29 PM                             SUBJECTIVE:        CC:     Zoila Dixon is a 77 year old White female.  presents today due to complaints of urinary frequency and abdominal pain X 3 days         HPI:         Patient to be evaluated for urinary frequency.  This began within the last 3 days.  Associated symptoms include bilateral flank pain, urgency, urinary frequency and dysuria.  She denies associated fever or hematuria.  Zoila Dixon states that she had multiple prior episodes of similar discomfort, which were diagnosed as simple UTI.  Treatments tried thus far include Increasing fluid intake.  Sees urology. Self caths herself daily.     ROS:     CONSTITUTIONAL:  Negative for chills and fever.      CARDIOVASCULAR:  Negative for chest pain and palpitations.      RESPIRATORY:  Negative for dyspnea and frequent wheezing.      GASTROINTESTINAL:  Negative for abdominal bloating and vomiting.      GENITOURINARY:  Positive for dysuria and frequent urination.   Negative for vaginal discharge.          [facility]/:     Last Reviewed on 8/02/2018 11:31 AM by Sadia Whipple    Past Medical History:                 PAST MEDICAL HISTORY         Hypertension    Aortic Valve Replacement - mechanical     Gastroesophageal Reflux Disease     Hypothyroidism         CURRENT MEDICAL PROVIDERS:    Cardiologist: Sky Bowman    Urologist: Dr. Jensen         PREVENTIVE HEALTH MAINTENANCE             BONE DENSITY: was last done 05/2017 with the following abnormality noted-- osteopenia     COLORECTAL CANCER SCREENING: Up to date (colonoscopy q10y; sigmoidoscopy q5y; Cologuard q3y) was last done 04/2014, Results are in chart; colonoscopy with the following abnormalities noted--  pelvic fixation - limited exam     MAMMOGRAM: Done within last 2 years and results in are chart was last done 18 with normal results         Surgical History:         Appendectomy: at age 24;     Hysterectomy: at age 24; complete;      Hernia Repair: right inguinal;     Laminectomy: lumbar region; 1975;     Aortic Valve Replacement;    Back Surgery;    Skin Cancer Removal; Procedures: colonoscopy /normal heart cath          Family History:         Positive for Myocardial Infarction ( father ).  Father:  at age 49; Cause of death was MI     Mother:  at age 94         Social History:     Occupation: Retired (Prior occupation: [employer])     Marital Status:       Children: 5 children         Tobacco/Alcohol/Supplements:     Last Reviewed on 2018 11:12 AM by Radha Walden    Tobacco: She has never smoked.          Alcohol:  Does not drink alcohol and never has.          Substance Abuse History:     Last Reviewed on 2/15/2018 10:54 AM by Jewel Chavez    NEGATIVE         Mental Health History:     Last Reviewed on 2/15/2018 10:54 AM by Jewel Chavez        Communicable Diseases (eg STDs):     Last Reviewed on 2/15/2018 10:54 AM by Jewel Chavez            Current Problems:     Last Reviewed on 2/15/2018 10:54 AM by Jewel Chavez    Moderate depression     Type 2 diabetes     Hypercholesterolemia     Use of high risk medications     Hypertension     Acquired hypothyroidism     Heart valve replacement patient     GERD     Dysuria         Immunizations:     Prevnar 13 (Pneumococcal PCV 13) 2016     Flulaval 2011     Fluzone pf (3+ years dose) 10/8/2013     Fluzone High-Dose pf (>=65 yr) 10/13/2014     Fluzone High-Dose pf (>=65 yr) 10/26/2015     Fluzone High-Dose pf (>=65 yr) 2016     Fluzone High-Dose pf (>=65 yr) 2017     PNEUMOVAX 23 (Pneumococcal PPV23) 2017         Allergies:     Last Reviewed on 2018  11:12 AM by Radha Walden    Sulfonamides: rash    Niacin (Nicotinic Acid):    medrol dose pack: chills, rash (Adverse Reaction)        Current Medications:     Last Reviewed on 8/02/2018 11:15 AM by Radha Walden    Metformin HCl 500mg Tablets, Extended Release 2 po daily     Nystatin/Triamcinolone 100,000U/1gm/0.1% Cream Apply thin film to affected area bid     Pantoprazole 40mg Tablets, Delayed Release Take 1 tablet(s) by mouth daily     Synthroid 0.075mg Tablet Take 1 tablet(s) by mouth daily     Accu-Chek Lana Plus Test Strips  Reagent Strips check blood sugar once daily  DXE11.9     Accu-Chek Softclix  Lancet Check blood sugar 1 x per day. DX: E11.9     Atorvastatin Calcium 40mg Tablet Take 1 tablet(s) by mouth daily     Potassium Chloride 20mEq Tablets, Extended Release Take 1 tablet(s) by mouth bid     CoQ10 daily     Fish Oil 1,000mg Softgel capsule 1 / day     Vitamin C     Vitamin D3     Vitamin E 1,000IU Capsules 1 capsule daily     Metoprolol Succinate 25mg Tablets, Extended Release 1 tab PO daily         OBJECTIVE:        Vitals:         Historical:     08/02/2018  BP:   131/51 mm Hg ( (left arm, , sitting, );)     07/19/2018  BP:   148/67 mm Hg ( (left arm, , sitting, );)         Current: 10/5/2018 10:57:12 AM    Ht:  5 ft, 6 in;  Wt: 160.8 lbs;  BMI: 26.0    T: 97.6 F (oral);  BP: 171/56 mm Hg (left arm, sitting);  P: 60 bpm (left arm (BP Cuff), sitting);  sCr: 0.67 mg/dL;  GFR: 71.91        Repeat:     11:16:57 AM     BP:   138/76mm Hg        Exams:     PHYSICAL EXAM:     GENERAL: well developed, well nourished;  no apparent distress;     RESPIRATORY: normal respiratory rate and pattern with no distress; normal breath sounds with no rales, rhonchi, wheezes or rubs;     CARDIOVASCULAR: normal rate; rhythm is regular;     GASTROINTESTINAL: mild suprapubic tenderness;  normal bowel sounds; no organomegaly;     MUSCULOSKELETAL: normal gait;     NEUROLOGIC: mental status: alert and  oriented x 3; GROSSLY INTACT     PSYCHIATRIC: appropriate affect and demeanor;         Lab/Test Results:             Glucose, Urine:  Neg (10/05/2018),     Bilirubin, urine:  Negative (10/05/2018),     Ketones, Urine Strip:  Negative (10/05/2018),     Specific Gravity, urine:  1.020 (10/05/2018),     Blood in Urine:  large (10/05/2018),     pH, urine:  6.5 (10/05/2018),     Protein Urine QL:  100 mg (10/05/2018),     Urobilinogen, urine:  0.2 E.U./dL (10/05/2018),     Nitrite, Urine:  Positive (10/05/2018),     Leukoctyes, urine:  Large (10/05/2018),     Appearance:  Clear (10/05/2018),     collection source:  Cath (10/05/2018),     Color:  Yellow (10/05/2018),     Performed by::  AS (10/05/2018),             Procedures:     Influenza vaccination     1. Influenza high dose 0.5 ml unit dose, AS, ABN signed given IM in the left upper arm; administered by AS;  lot number [id number]; expires 05/03/19 Regarding contraindications to an Influenza vaccine: Denies moderate/severe illness with/without fever; serious reaction to eggs, egg proteins, gentamicin, gelatin, arginine, neomycin or polymixin; serious reaction after recieving previous influenza vaccines; and history of Guillain-Varnell Syndrome.              ASSESSMENT           599.0   N39.0  Urinary Tract Infection              DDx:     V04.81   Z23  Influenza vaccination              DDx:         ORDERS:         Meds Prescribed:       Ciprofloxacin HCl 500mg Tablet Take 1 tablet(s) by mouth q12h for 7 days  #14 (Fourteen) tablet(s) Refills: 0         Lab Orders:       [id number]  Urinalysis, automated, without microscopy  (In-House)         97449  [facility] - [facility] Urine Culture  (Send-Out)           Procedures Ordered:       02399  Fluzone High Dose  (In-House)           Other Orders:         Catheterization for collection of specimen(s), single patient (x1)         Administration of influenza virus vaccine (x1)                 PLAN:          Urinary Tract Infection  Patient self cathed to obtain urine sample. Await urine culture.     LABORATORY:  Labs ordered to be performed today include Urine culture.      RECOMMENDATIONS given include: Push Fluids, Rest, Follow up if no improvement or worsening symptoms like high fevers, vomiting, weakness, or increasing shortness of air.    .      FOLLOW-UP: Schedule follow-up appointments on a p.r.n. basis. Chronic visit follow up           Prescriptions:       Ciprofloxacin HCl 500mg Tablet Take 1 tablet(s) by mouth q12h for 7 days  #14 (Fourteen) tablet(s) Refills: 0           Orders:       [id number]  Urinalysis, automated, without microscopy  (In-House)                     Catheterization for collection of specimen(s), single patient (x1)       42819  [facility] - [facility] Urine Culture  (Send-Out)            Influenza vaccination         IMMUNIZATIONS given today: Influenza HIGH Dose.            Orders:       [id number]  Fluzone High Dose  (In-House)                     Administration of influenza virus vaccine (x1)             Patient Recommendations:        For  Urinary Tract Infection:     Schedule follow-up appointments as needed.              CHARGE CAPTURE           **Please note: ICD descriptions below are intended for billing purposes only and may not represent clinical diagnoses**        Primary Diagnosis:         599.0 Urinary Tract Infection            N39.0    Urinary tract infection, site not specified              Orders:          73658   Office/outpatient visit; established patient, level 3  (In-House)             03244   Urinalysis, automated, without microscopy  (In-House)                                           Catheterization for collection of specimen(s), single patient (x1)         V04.81 Influenza vaccination            Z23    Encounter for immunization              Orders:          [id number]   Fluzone High Dose  (In-House)                                           Administration of influenza virus vaccine (x1)              ADDENDUMS:      ____________________________________    Addendum: 10/05/2018 03:09 PM - Carlyn Shi        no charge for urine dip, sent out for culture.

## 2021-05-18 NOTE — PROGRESS NOTES
Zoila Whipple 1940     Office/Outpatient Visit    Visit Date: Mon, Feb 18, 2019 09:47 am    Provider: Jewel Chavez MD (Assistant: Breann Chandra RN)    Location: [facility]        Electronically signed by Jewel Chavez MD on  02/19/2019 05:33:39 PM                             SUBJECTIVE:        CC: dizziness         HPI:     Zoila Dixon is worried about dizziness.  She has noted some dizzy spells over the last 3 weeks.  She is worried about her granddaughter, but she says that she feels like she may black out.  She has had four different episodes.  Duration - brief - a few second.  The last episode scared her though.  She is not sure what is causing this.  She denies speech or swallowing difficulty.  She denies numbness on either side.  She does not have pacemaker.     ROS:     CONSTITUTIONAL:  Negative for chills and fever.      CARDIOVASCULAR:  Negative for chest pain and palpitations.      RESPIRATORY:  Negative for recent cough and dyspnea.      GASTROINTESTINAL:  Negative for abdominal pain, nausea and vomiting.      INTEGUMENTARY/BREAST:  Negative for atypical mole(s) and rash.      PSYCHIATRIC:  Positive for anxiety.   Negative for depression.          [facility]/FM/SH:     Last Reviewed on 2/18/2019 10:13 AM by Jewel Chavez    Past Medical History:                 PAST MEDICAL HISTORY         Hypertension    Aortic Valve Replacement - mechanical     Gastroesophageal Reflux Disease     Hypothyroidism         CURRENT MEDICAL PROVIDERS:    Cardiologist: Sky Bowman    Urologist: Dr. Jensen         PREVENTIVE HEALTH MAINTENANCE             BONE DENSITY: was last done 05/2017 with the following abnormality noted-- osteopenia     COLORECTAL CANCER SCREENING: Up to date (colonoscopy q10y; sigmoidoscopy q5y; Cologuard q3y) was last done 04/2014, Results are in chart; colonoscopy with the following abnormalities noted-- pelvic fixation - limited exam     EYE EXAM: Diabetic Eye Exam during  this calendar year and results are in chart was last done 2018     MAMMOGRAM: Done within last 2 years and results in are chart was last done 18 with normal results         Surgical History:         Appendectomy: at age 24;     Hysterectomy: at age 24; complete;      Hernia Repair: right inguinal;     Laminectomy: lumbar region; 1975;     Aortic Valve Replacement;    Back Surgery;    Skin Cancer Removal; Procedures: colonoscopy /normal heart cath          Family History:         Positive for Myocardial Infarction ( father ).  Father:  at age 49; Cause of death was MI     Mother:  at age 94         Social History:     Occupation: Retired (Prior occupation: [employer])     Marital Status:       Children: 5 children         Tobacco/Alcohol/Supplements:     Last Reviewed on 2019 10:13 AM by Jewel Chavez    Tobacco: She has never smoked.          Alcohol:  Does not drink alcohol and never has.          Substance Abuse History:     Last Reviewed on 2019 10:13 AM by Jewel Chavez    NEGATIVE         Mental Health History:     Last Reviewed on 2019 10:13 AM by Jewel Chavez        Communicable Diseases (eg STDs):     Last Reviewed on 2019 10:13 AM by Jewel Chavez            Current Problems:     Last Reviewed on 2019 10:13 AM by Jewel Chavez    Near-syncope     History of recurrent UTI's     Moderate depression     Type 2 diabetes     Hypercholesterolemia     Use of high risk medications     Hypertension     Acquired hypothyroidism     Heart valve replacement patient     GERD     Dysuria         Immunizations:     Havrix -adult dose (HepA) 2018     Havrix -adult dose (HepA) 2018     Prevnar 13 (Pneumococcal PCV 13) 2016     Flulaval 2011     Fluzone pf (3+ years dose) 10/8/2013     Fluzone High-Dose pf (>=65 yr) 10/13/2014     Fluzone High-Dose pf (>=65 yr) 10/26/2015     Fluzone High-Dose pf  (>=65 yr) 9/21/2016     Fluzone High-Dose pf (>=65 yr) 9/21/2017     Fluzone High-Dose pf (>=65 yr) 10/5/2018     PNEUMOVAX 23 (Pneumococcal PPV23) 9/21/2017         Allergies:     Last Reviewed on 2/18/2019 10:13 AM by Jewel Chavez    Sulfonamides: rash    Niacin (Nicotinic Acid):    Bactrim:        Current Medications:     Last Reviewed on 2/18/2019 10:13 AM by Jewel Chavez    Metformin HCl 500mg Tablets, Extended Release Take 2 tablet(s) by mouth daily     Pantoprazole 40mg Tablets, Delayed Release Take 1 tablet(s) by mouth daily     Synthroid 0.075mg Tablet Take 1 tablet(s) by mouth daily     Nystatin/Triamcinolone 100,000U/1gm/0.1% Cream Apply thin film to affected area bid     Accu-Chek Lana Plus Test Strips  Reagent Strips check blood sugar once daily  DXE11.9     Accu-Chek Softclix  Lancet Check blood sugar 1 x per day. DX: E11.9     Atorvastatin Calcium 20mg Tablet 1 tab daily     Potassium Chloride 20mEq Tablets, Extended Release Take 1 tablet(s) by mouth bid     CoQ10 daily     Fish Oil 1,000mg Softgel capsule 1 / day     Vitamin C     Vitamin D3     Vitamin E 1,000IU Capsules 1 capsule daily     Metoprolol Succinate 25mg Tablets, Extended Release 1 tab PO daily     Cephalexin 250mg Capsules 1 capsule daily     Furosemide 20mg Tablets 1 tab daily     hemp oil 2500mg daily     Uribel Capsules take 1 bid prn         OBJECTIVE:        Vitals:         Current: 2/18/2019 9:54:06 AM    Ht:  5 ft, 6 in;  Wt: 169.2 lbs;  BMI: 27.3    T: 97.6 F (oral);  BP: 167/45 mm Hg (left arm, sitting);  P: 65 bpm (left arm (BP Cuff), sitting);  sCr: 0.8 mg/dL;  GFR: 60.57        Exams:     PHYSICAL EXAM:     GENERAL: vital signs recorded - well developed, well nourished;  no apparent distress;     EYES: extraocular movements intact; conjunctiva and cornea are normal; PERRLA;     E/N/T:  normal EACs, TMs, nasal/oral mucosa, teeth, gingiva, and oropharynx;     NECK: range of motion is normal; thyroid is  non-palpable;     RESPIRATORY: normal respiratory rate and pattern with no distress; normal breath sounds with no rales, rhonchi, wheezes or rubs;     CARDIOVASCULAR: normal rate; rhythm is regular;  a systolic murmur is noted: it is grade 3/6;  no edema;     GASTROINTESTINAL: nontender; normal bowel sounds; no organomegaly;     BREAST/INTEGUMENT: SKIN: no significant rashes or lesions; no suspicious moles;     PSYCHIATRIC: affect/demeanor: anxious;         Lab/Test Results:         LABORATORY RESULTS: EKG performed by tls         Procedures:     Near-syncope        Holter Monitor: Holter monitor was hooked up, Zoila Dixon was given instructions on use.  Patient informed to return with device. 48 hour monitor             ASSESSMENT           780.2   R55  Near-syncope              DDx:     244.8   E03.8  Acquired hypothyroidism              DDx:     250.00   E11.9  Type 2 diabetes              DDx:     272.0   E78.2  Hypercholesterolemia              DDx:     719.47   M25.571  Joint pain, ankle and foot              DDx:         ORDERS:         Radiology/Test Orders:       [id number]  Right radiologic examination, foot; complete, minimum of three views  (Send-Out)         10110  Electrocardiogram, routine with at least 12 leads; with interpretation and report  (In-House)         90211  Holter monitor connection and disconnection  (In-House)         3014F  Screening mammography results documented and reviewed (PV)1  (In-House)         3017F  Colorectal CA screen results documented and reviewed (PV)  (In-House)         2022F  Dilated retinal eye exam w/interpret by ophthalmologist/optometrist documented/reviewed (DM)4  (In-House)           Lab Orders:       [id number]  BDCB2 - [facility] CBC w/o diff  (Send-Out)         85661  COMP - [facility] Comp. Metabolic Panel  (Send-Out)         96253  MG - [facility] Magnesium, Serum  (Send-Out)         56930  SED - [facility] Sedimentation rate, non-automated ESR  (Send-Out)         36040  URIC - [facility] Uric Acid,  Serum  (Send-Out)         34957  TSH - [facility] TSH  (Send-Out)           Other Orders:         Calculated BMI above the upper parameter and a follow-up plan was documented in the medical record  (In-House)                   PLAN:          Near-syncope    LABORATORY:  Labs ordered to be performed today include CBC W/O DIFF, Comprehensive metabolic panel, and Magnesium level.      TESTS/PROCEDURES:  Will proceed with an ECG and Holter Monitor 48 hour to be performed/scheduled now.      RECOMMENDATIONS given include: Zoila Dixon is concerned about these episodes.  We will evaluate her as noted below.  She apparently had normal echo recently.  We will see what this testing shows and be back in touch with her..  MIPS Vaccines Flu and Pneumonia updated in Shot record    DIABETIC EYE EXAM: Diabetic Eye Exam during this calendar year and results are in chart     MAMMOGRAM: Done within last 2 years and results in are chart     COLORECTAL CANCER SCREENING: Results are in chart     BMI Elevated - Follow-Up Plan: She was provided education on weight loss strategies           Orders:       29000  BDCB2 - [facility] CBC w/o diff  (Send-Out)         54686  COMP - [facility] Comp. Metabolic Panel  (Send-Out)         26215  MG - [facility] Magnesium, Serum  (Send-Out)         42651  Electrocardiogram, routine with at least 12 leads; with interpretation and report  (In-House)         00697  Holter monitor connection and disconnection  (In-House)         3014F  Screening mammography results documented and reviewed (PV)1  (In-House)         3017F  Colorectal CA screen results documented and reviewed (PV)  (In-House)           Calculated BMI above the upper parameter and a follow-up plan was documented in the medical record  (In-House)         2022F  Dilated retinal eye exam w/interpret by ophthalmologist/optometrist documented/reviewed (DM)4  (In-House)             Patient Education Handouts:       Fainting (Syncope)           Acquired hypothyroidism      LABORATORY:  Labs ordered to be performed today include TSH.            Orders:       21659  TSH - [facility] TSH  (Send-Out)            Type 2 diabetes As above.          Hypercholesterolemia As above.          Joint pain, ankle and foot     LABORATORY:  Labs ordered to be performed today include ESR and uric acid.      RADIOLOGY:  I have ordered a right foot x-ray to be done today.            Orders:       [id number]  Right radiologic examination, foot; complete, minimum of three views  (Send-Out)         92685  SED - [facility] Sedimentation rate, non-automated ESR  (Send-Out)         92763  URIC - [facility] Uric Acid, Serum  (Send-Out)               CHARGE CAPTURE           **Please note: ICD descriptions below are intended for billing purposes only and may not represent clinical diagnoses**        Primary Diagnosis:         780.2 Near-syncope            R55    Syncope and collapse              Orders:          89858   Office/outpatient visit; established patient, level 4  (In-House)             50408   Electrocardiogram, routine with at least 12 leads; with interpretation and report  (In-House)             09042   Holter monitor connection and disconnection  (In-House)             3014F   Screening mammography results documented and reviewed (PV)1  (In-House)             3017F   Colorectal CA screen results documented and reviewed (PV)  (In-House)                Calculated BMI above the upper parameter and a follow-up plan was documented in the medical record  (In-House)             2022F   Dilated retinal eye exam w/interpret by ophthalmologist/optometrist documented/reviewed (DM)4  (In-House)           244.8 Acquired hypothyroidism            E03.8    Other specified hypothyroidism    250.00 Type 2 diabetes            E11.9    Type 2 diabetes mellitus without complications    272.0 Hypercholesterolemia            E78.2    Mixed hyperlipidemia    719.47 Joint pain, ankle and foot            M25.571    Pain in right ankle and  joints of right foot        ADDENDUMS:      ____________________________________    Addendum: 02/20/2019 01:33 PM - Anya Solis        48 hr holter returned and data up loaded to [facility]./pr

## 2021-05-18 NOTE — PROGRESS NOTES
Zoila Whipple  1940     Office/Outpatient Visit    Visit Date: Thu, Dec 12, 2019 01:07 pm    Provider: Jewel Chavez MD (Assistant: Jossie Clemente, )    Location: [facility]        Electronically signed by Jewel Chavez MD on  12/14/2019 07:03:19 AM                             Subjective:        CC:     ROS:     CONSTITUTIONAL:  Negative for chills and fever.      CARDIOVASCULAR:  Negative for chest pain and palpitations.      RESPIRATORY:  Negative for recent cough and dyspnea.      GASTROINTESTINAL:  Negative for abdominal pain, nausea and vomiting.      INTEGUMENTARY/BREAST:  Negative for atypical mole(s) and rash.          Past Medical History / Family History / Social History:         Last Reviewed on 12/12/2019 02:24 PM by Jewel Chavez    Past Medical History:                 PAST MEDICAL HISTORY         Hypertension    Aortic Valve Replacement - mechanical     Gastroesophageal Reflux Disease     Hypothyroidism         CURRENT MEDICAL PROVIDERS:    Cardiologist: Sky Bowman    Urologist: Dr. Jensen             ADVANCE DIRECTIVES: Living will - Her children would make decisions for her if needed.          PREVENTIVE HEALTH MAINTENANCE             BONE DENSITY: was last done 6/27/19 with the following abnormality noted-- Osteopenia     COLORECTAL CANCER SCREENING: Up to date (colonoscopy q10y; sigmoidoscopy q5y; Cologuard q3y) was last done 04/2014, Results are in chart; colonoscopy with the following abnormalities noted-- pelvic fixation - limited exam     EYE EXAM: Diabetic Eye Exam during this calendar year and results are in chart was last done 08/2018     MAMMOGRAM: Done within last 2 years and results in are chart was last done 6/12/19 with normal results         Surgical History:         Appendectomy: at age 24;     Hysterectomy: at age 24; complete;     Hernia Repair: right inguinal;     Laminectomy: lumbar region; 1975;     Aortic Valve Replacement;     Back Surgery;    Skin Cancer Removal; Procedures: colonoscopy /normal heart cath          Family History:         Positive for Myocardial Infarction ( father ).  Father:  at age 49; Cause of death was MI     Mother:  at age 94         Social History:     Occupation: Retired (Prior occupation: [employer])     Marital Status:       Children: 5 children         Tobacco/Alcohol/Supplements:     Last Reviewed on 2019 02:24 PM by Jewel Chavez    Tobacco: She has never smoked.          Alcohol:  Does not drink alcohol and never has.          Substance Abuse History:     Last Reviewed on 2019 02:24 PM by Jewel Chavez    NEGATIVE         Mental Health History:     Last Reviewed on 2019 02:24 PM by Jewel Chavez        Communicable Diseases (eg STDs):     Last Reviewed on 2019 02:24 PM by Jewel Chavez        Current Problems:     Last Reviewed on 2019 02:24 PM by Jewel Chavez    Gastro-esophageal reflux disease without esophagitis    Presence of prosthetic heart valve    Other specified hypothyroidism    Essential (primary) hypertension    Long term (current) use of anticoagulants    Mixed hyperlipidemia    Type 2 diabetes mellitus without complications    Dysuria    Major depressive disorder, single episode, moderate    Urinary tract infection, site not specified    Syncope and collapse    Other fatigue    Impacted cerumen, right ear    Acute upper respiratory infection, unspecified    Pain in left arm    Pain in left hand    Laceration without foreign body of scalp, initial encounter        Immunizations:     Havrix -adult dose (HepA) 2018    Havrix -adult dose (HepA) 2018    Prevnar 13 (Pneumococcal PCV 13) 2016    Flulaval 2011    Fluzone pf (3+ years dose) 10/8/2013    Fluzone High-Dose pf (>=65 yr) 10/13/2014    Fluzone High-Dose pf (>=65 yr) 10/26/2015    Fluzone High-Dose pf (>=65 yr)  9/21/2016    Fluzone High-Dose pf (>=65 yr) 9/21/2017    Fluzone High-Dose pf (>=65 yr) 10/5/2018    Fluzone High-Dose pf (>=65 yr) 9/30/2019    PNEUMOVAX 23 (Pneumococcal PPV23) 9/21/2017        Allergies:     Last Reviewed on 12/12/2019 02:24 PM by Jewel Chavez    medrol dose pack: chills, rash  (Adverse Reaction)    Sulfonamides: rash     Bactrim:      Niacin (Nicotinic Acid):          Current Medications:     Last Reviewed on 12/12/2019 02:24 PM by Jewel Chavez    Vitamin C     Vitamin E 1,000IU Capsules [1 capsule daily]    Lisinopril 10mg Tablet [Take 1 tablet(s) by mouth daily]    Synthroid 0.075mg Tablet [Take 1 tablet(s) by mouth daily]    Pantoprazole 40mg Tablets, Delayed Release [Take 1 tablet(s) by mouth daily]    Fish Oil 1,000mg Softgel capsule [1 / day]    Potassium Chloride 20mEq Tablets, Extended Release [Take 1 tablet(s) by mouth bid]    Atorvastatin Calcium 20mg Tablet [1 tab daily]    Accu-Chek Lana Plus Test Strips  Reagent Strips [check blood sugar once daily  DXE11.9]    Accu-Chek Softclix  Lancet [Check blood sugar 1 x per day. DX: E11.9]    Metoprolol Succinate 25mg Tablets, Extended Release [1 tab PO daily]    Cephalexin 250mg Capsules [1 capsule daily]    Metformin HCl 500mg Tablets, Extended Release [Take 2 tablet(s) by mouth daily]    Vitamin D3     CoQ10 daily    Uribel 118mg/40.8mg/36mg/10mg/0.12mg Capsules [take 1 bid prn ]    Furosemide 20mg Tablets [1 tab daily]    hemp oil 2500mg daily        Objective:        Vitals:         Current: 12/12/2019 1:16:18 PM    Ht:  5 ft, 6 in;  Wt: 169.6 lbs;  BMI: 27.4T: 97.4 F (oral);  BP: 137/51 mm Hg (right arm, sitting);  P: 61 bpm (right arm (BP Cuff), sitting);  sCr: 0.78 mg/dL;  GFR: 61.19        Exams:     PHYSICAL EXAM:     GENERAL: vital signs recorded - well developed, well nourished;  no apparent distress;  there is a small laceration in the L parietal region - no active bleeding is noted though;     EYES:  extraocular movements intact; conjunctiva and cornea are normal; PERRL;     E/N/T:  normal EACs, TMs, nasal/oral mucosa, teeth, gingiva, and oropharynx;     NECK: range of motion is normal; thyroid is non-palpable;     RESPIRATORY: normal respiratory rate and pattern with no distress; normal breath sounds with no rales, rhonchi, wheezes or rubs;     CARDIOVASCULAR: normal rate; rhythm is regular;  no systolic murmur; no edema;     GASTROINTESTINAL: nontender; normal bowel sounds; no organomegaly;     LYMPHATIC: no enlargement of cervical or facial nodes; no supraclavicular nodes;     BREAST/INTEGUMENT: SKIN: no significant rashes or lesions; no suspicious moles;     MUSCULOSKELETAL: there is fairly good ROM in the L elbow - bruising is noted in the L upper arm just proximal to the elbow - there is some tenderness in the fifth metacarpal - no bony stepoff is noted;     NEUROLOGIC: no focal weakness is apparent;         Assessment:         S01.01XA   Laceration without foreign body of scalp, initial encounter       M79.642   Pain in left hand       M79.602   Pain in left arm           ORDERS:         Radiology/Test Orders:       [id number]  Left radiologic examination; hand, minimum of three views  (Send-Out)            [id number]  Left radiologic examination; humerus, minimum of two views  (Send-Out)                      Plan:         Pain in left hand        RADIOLOGY:  I have ordered a Left hand hand x-ray to be done today.            Orders:       [id number]  Left radiologic examination; hand, minimum of three views  (Send-Out)              Pain in left arm        RADIOLOGY:  I have ordered a Left Humerous x-ray to be done today.            Orders:       [id number]  Left radiologic examination; humerus, minimum of two views  (Send-Out)                  Charge Capture:         Primary Diagnosis:     S01.01XA  Laceration without foreign body of scalp, initial encounter     M79.642  Pain in left hand     M79.602  Pain in left  arm         ADDENDUMS:      ____________________________________    Addendum: 12/16/2019 08:09 PM - Jewel Chavez        CHIEF COMPLAINT:   Fall, hand and arm pain.         HISTORY OF PRESENT ILLNESS:  Zoila Dixon walked in today after a     fall.  She fell about 30 minutes prior to coming to the visit.  She actually     has an elevator in her home between the first and second floors.  She     states a cable broke on the elevator, and she fell because of it.  She hit     her head and landed on her left arm and hand.  With regard to the head,     she noted some bleeding initially, but that has stopped.  She did not lose     consciousness or feel an altered mental state.  She was somewhat     scared by what happened.  She denies neck pain with this.         She has noted pain in the left hand and states that is probably the     area that is bothering her the most.  She has pain in the outside portion     of the hand, proximal to the pinky finger.  She has not had problems with     this specific area before.         She also complains of pain in the left arm proximal to the elbow.      She hit that area fairly hard and has noted some bruising.  It is tender to     touch as well.              ASSESSMENT/PLAN:      1)Today we will go ahead and get x-rays and see what they show.  I     don't think there is going to be fracture, but certainly she had a     significant injury.      2)The scalp laceration does not seem to be significant.  She will keep     an eye on that.      3)I did recommend she monitor her overall mental status.  I have asked     her family to call her every couple of hours and just make sure she is     doing okay.  If there is an obvious change in her status, we would     recommend she seek additional attention.      4)Otherwise, we will be back in touch with her regarding the x-rays.                     Addendum: 12/16/2019 08:09 PM - Jewel Chavez        Please add [id number].

## 2021-05-18 NOTE — PROGRESS NOTES
Zoila Whipple  1940     Office/Outpatient Visit    Visit Date: Wed, Feb 5, 2020 09:40 am    Provider: Smooth Alejandre MD (Assistant: Brooke Cuellar MA)    Location: [facility]        Electronically signed by Smooth Alejandre MD on  02/05/2020 10:52:09 AM                             Subjective:        CC: (PT IS NOT TAKING POSTASSIUM, METFORMIN)Zoila Dixon is a 79 year old White female.  She presents with cough, body aches, headache. Pt just got back from a cruise on Monday to [place] & [place].          HPI:           Patient to be evaluated for acute upper respiratory infection, unspecified.  These have been present for the past one to two days.  The symptoms include body aches, chest congestion, Chills, cough and headache.  She denies fever, nasal congestion, nasal discharge or sinus pain/pressure.  She denies exposure to ill contacts.  She has already tried to relieve the symptoms with acetaminophen.  Medical history is significant for diabetes.  Of note, patient reports that she was on a cruise to [place] in [place].  She just returned home on Monday.  As far she knows, she was not exposed to anybody from the [place] area of [place] or anybody who is been exposed/indirect contact with someone with coronavirus.      Patient reports that her blood pressure, though elevated today, has been under good control at home.  She reports that she checks it periodically with average readings of 130s systolic over 60s diastolic.  Her current regimen includes Toprol-XL 25 mg daily, Lasix 20 mg daily and lisinopril 10 mg daily.  She denies blurry vision, chest pain, shortness of breath, edema or palpitations. She does endorse headache as above.    ROS:     CONSTITUTIONAL:  Positive for chills.   Negative for fatigue or fever.      E/N/T:  Negative for ear pain, tinnitus, nasal congestion, frequent rhinorrhea, sinus pressure and sore tongue.      CARDIOVASCULAR:  Negative for chest pain, dizziness, palpitations and  edema.      RESPIRATORY:  Positive for cough and chest congestion.   Negative for dyspnea.      GASTROINTESTINAL:  Negative for abdominal pain, diarrhea, nausea and vomiting.      MUSCULOSKELETAL:  Positive for arthralgias and myalgias.      INTEGUMENTARY/BREAST:  Negative for rash, breast mass and skin changes of breast.      NEUROLOGICAL:  Positive for headaches.   Negative for weakness.          Past Medical History / Family History / Social History:         Last Reviewed on 2020 10:51 AM by Smooth Alejandre    Past Medical History:                 PAST MEDICAL HISTORY         Hypertension    Aortic Valve Replacement - mechanical     Gastroesophageal Reflux Disease     Hypothyroidism         CURRENT MEDICAL PROVIDERS:    Cardiologist: Sky Bowman    Urologist: Dr. Jensen             ADVANCE DIRECTIVES: Living will - Her children would make decisions for her if needed.          PREVENTIVE HEALTH MAINTENANCE             BONE DENSITY: was last done 19 with the following abnormality noted-- Osteopenia     COLORECTAL CANCER SCREENING: Up to date (colonoscopy q10y; sigmoidoscopy q5y; Cologuard q3y) was last done 2014, Results are in chart; colonoscopy with the following abnormalities noted-- pelvic fixation - limited exam     EYE EXAM: Diabetic Eye Exam during this calendar year and results are in chart was last done 2019     MAMMOGRAM: Done within last 2 years and results in are chart was last done 19 with normal results         Surgical History:         Appendectomy: at age 24;     Hysterectomy: at age 24; complete;     Hernia Repair: right inguinal;     Laminectomy: lumbar region; 1975;     Aortic Valve Replacement;    Back Surgery;    Skin Cancer Removal; Procedures: colonoscopy /normal heart cath          Family History:         Positive for Myocardial Infarction ( father ).  Father:  at age 49; Cause of death was MI     Mother:  at age 94         Social History:      Occupation: Retired (Prior occupation: [employer])     Marital Status:       Children: 5 children         Tobacco/Alcohol/Supplements:     Last Reviewed on 2/05/2020 10:51 AM by Smooth Alejandre    Tobacco: She has never smoked.          Alcohol:  Does not drink alcohol and never has.          Substance Abuse History:     Last Reviewed on 2/05/2020 10:51 AM by Smooth Alejandre    NEGATIVE         Mental Health History:     Last Reviewed on 2/05/2020 10:51 AM by Smooth Alejandre        Communicable Diseases (eg STDs):     Last Reviewed on 2/05/2020 10:51 AM by Smooth Alejandre        Current Problems:     Last Reviewed on 2/05/2020 10:51 AM by Smooth Alejandre    Gastro-esophageal reflux disease without esophagitis    Presence of prosthetic heart valve    Other specified hypothyroidism    Essential (primary) hypertension    Long term (current) use of anticoagulants    Mixed hyperlipidemia    Type 2 diabetes mellitus without complications    Dysuria    Major depressive disorder, single episode, moderate    Urinary tract infection, site not specified    Syncope and collapse    Other fatigue    Impacted cerumen, right ear    Acute upper respiratory infection, unspecified    Pain in left arm    Pain in left hand    Laceration without foreign body of scalp, initial encounter    Diarrhea, unspecified        Immunizations:     Havrix -adult dose (HepA) 6/18/2018    Havrix -adult dose (HepA) 12/19/2018    Prevnar 13 (Pneumococcal PCV 13) 9/21/2016    Flulaval 9/16/2011    Fluzone pf (3+ years dose) 10/8/2013    Fluzone High-Dose pf (>=65 yr) 10/13/2014    Fluzone High-Dose pf (>=65 yr) 10/26/2015    Fluzone High-Dose pf (>=65 yr) 9/21/2016    Fluzone High-Dose pf (>=65 yr) 9/21/2017    Fluzone High-Dose pf (>=65 yr) 10/5/2018    Fluzone High-Dose pf (>=65 yr) 9/30/2019    PNEUMOVAX 23 (Pneumococcal PPV23) 9/21/2017        Allergies:     Last Reviewed on 2/05/2020 10:51 AM by Smooth Alejandre    medrol dose pack: Rash,chills   (Adverse Reaction)    Sulfonamides: Rash     Bactrim:      Niacin (Nicotinic Acid):          Current Medications:     Last Reviewed on 2/05/2020 10:51 AM by Smooth Alejandre with mary jo hips     Vitamin E 1,000 unit oral capsule [1 capsule daily]    lisinopriL 10 mg oral tablet [Take 1 tablet(s) by mouth daily]    Synthroid 75 mcg oral tablet [Take 1 tablet(s) by mouth daily]    Pantoprazole 40 mg oral tablet, delayed release (enteric coated) [Take 1 tablet(s) by mouth daily]    Fish Oil 300-1,000 mg oral capsule [1 / day]    atorvastatin 20 mg oral tablet [1 tab daily]    Accu-Chek Lana Plus Test Strips  Reagent Strips [check blood sugar once daily  DXE11.9]    Accu-Chek Softclix  Lancet [Check blood sugar 1 x per day. DX: E11.9]    Metoprolol Succinate 25 mg oral Tablet, Extended Release 24 hr [1 tab PO daily]    Cephalexin 250 mg oral capsule [1 capsule daily]    metFORMIN 500 mg oral Tablet, Extended Release 24 hr [Take 2 tablet(s) by mouth daily]    Kids Vitamin D3     CoQ10 daily     Uribel 118-10-40.8-36 mg oral capsule [take 1 bid prn ]    Furosemide 20 mg oral tablet [1 tab daily]    potassium chloride 10 mEq oral capsule, extended release [one BID]    Transderm-Scop 1 mg over 3 days Transdermal Patch,Transdermal 3 day [apply 1 patch by transdermal route to the hairless area behind 1 ear at least 4 hr before effect is required; reapply every 3 days as needed]        Objective:        Vitals:         Current: 2/5/2020 9:48:21 AM    Ht:  5 ft, 6 in;  Wt: 172.2 lbs;  BMI: 27.8T: 98.1 F (oral);  BP: 156/36 mm Hg (left arm, sitting);  P: 68 bpm (left arm (BP Cuff), sitting);  sCr: 0.8 mg/dL;  GFR: 60.05O2 Sat: 96 % (room air)        Repeat:     9:55:29 AM  BP:   156/62mm Hg (right arm, sitting)     Exams:     PHYSICAL EXAM:     GENERAL: vital signs recorded - well developed, well nourished;  no apparent distress;     EYES: conjunctiva and cornea are normal;     E/N/T: EARS: bilateral TMs are normal;   "NOSE: nasal mucosa is erythematous;  bilateral maxillary and bilateral frontal sinus tenderness present; OROPHARYNX: posterior pharynx shows no exudate and erythema;     NECK: trachea is midline; thyroid is non-palpable;     RESPIRATORY: Clear to auscultation bilaterally; no rales (\"crackles\") present; no rhonchi; no wheezes;     CARDIOVASCULAR: normal rate; rhythm is regular;  No murmurs. clicks, gallops or rubs appreciated; no edema;     LYMPHATIC: no enlargement of cervical or facial nodes; no supraclavicular nodes;     BREAST/INTEGUMENT: No significant rashes, lesions or suspicious moles within limits of examination;     NEUROLOGIC: Grossly intact; mental status: alert and oriented x 3;     PSYCHIATRIC: appropriate affect and demeanor; normal speech pattern; Normal behavior;         Lab/Test Results:         Influenza A and B: Negative (02/05/2020),     Performed by:: antionette (02/05/2020),             Assessment:         J06.9   Acute upper respiratory infection, unspecified       I10   Essential (primary) hypertension           ORDERS:         Meds Prescribed:       [New Rx] promethazine-DM 6.25-15 mg/5 mL oral Syrup [take 5 milliliters by oral route every 4 hours as needed, not to exceed 30 mL in 24 hours], #118 (one hundred and eighteen) milliliters, Refills: 0 (zero)       [New Rx] predniSONE 20 mg oral tablet [Take 2 tabs daily], #10 (ten) tablets, Refills: 0 (zero)         Radiology/Test Orders:       [id number]  Radiologic exam chest 2 views  (Send-Out)              Lab Orders:       [id number]  Infectious agent antigen detection by immunoassay; Influenza  (In-House)            [id number]  Infectious agent antigen detection by immunoassay; Influenza  (In-House)                      Plan:         Acute upper respiratory infection, unspecified- Appears to be viral at this time.  Rapid flu negative in office today.  However, there is some concern for pneumonia and acute sinusitis given patient's productive cough/chills and " sinus tenderness.  Will order chest x-ray to further evaluate for pneumonia and treat if indicated.  Will start Promethazine DM for cough suppression/decongestant and prednisone 40 mg daily for 5 days. I have provided the patient with a delayed antibiotic prescription for Augmentin To be filled if she develops overt Bacterial symptoms (spiking fever, increased sputum purulence, etc.) or if her symptoms persist to 7 to 10 days total duration despite symptomatic treatment. ED/return precautions given.         RADIOLOGY:  I have ordered a chest x-ray (PA and lateral) to be done today.            Prescriptions:       [New Rx] promethazine-DM 6.25-15 mg/5 mL oral Syrup [take 5 milliliters by oral route every 4 hours as needed, not to exceed 30 mL in 24 hours], #118 (one hundred and eighteen) milliliters, Refills: 0 (zero)       [New Rx] predniSONE 20 mg oral tablet [Take 2 tabs daily], #10 (ten) tablets, Refills: 0 (zero)           Orders:       [id number]  Infectious agent antigen detection by immunoassay; Influenza  (In-House)            [id number]  Infectious agent antigen detection by immunoassay; Influenza  (In-House)            [id number]  Radiologic exam chest 2 views  (Send-Out)              Essential (primary) hypertension- Not controlled today but reportedly controlled at home.  Patient declines changes to her regimen as she thinks that her elevated blood pressure today is due to her acute illness.  Continue Toprol-XL 25 mg daily, Lasix 20 mg daily and lisinopril 10 mg daily.            Charge Capture:         Primary Diagnosis:     J06.9  Acute upper respiratory infection, unspecified           Orders:      [id number]  Office/outpatient visit; established patient, level 4  (In-House)            [id number]  Infectious agent antigen detection by immunoassay; Influenza  (In-House)            [id number]  Infectious agent antigen detection by immunoassay; Influenza  (In-House)              I10  Essential (primary) hypertension

## 2021-06-18 RX ORDER — FERROUS SULFATE 325(65) MG
TABLET ORAL
COMMUNITY
End: 2021-06-21

## 2021-06-18 RX ORDER — MECLIZINE HYDROCHLORIDE 25 MG/1
TABLET ORAL
COMMUNITY
End: 2021-06-21

## 2021-06-18 RX ORDER — ERYTHROMYCIN 5 MG/G
OINTMENT OPHTHALMIC
COMMUNITY
End: 2021-06-21

## 2021-06-18 RX ORDER — LISINOPRIL 20 MG/1
20 TABLET ORAL DAILY
COMMUNITY
End: 2021-07-29 | Stop reason: SDUPTHER

## 2021-06-18 RX ORDER — WARFARIN SODIUM 5 MG/1
TABLET ORAL
COMMUNITY
End: 2021-06-21

## 2021-06-18 RX ORDER — POTASSIUM CHLORIDE 20 MEQ/1
20 TABLET, EXTENDED RELEASE ORAL 2 TIMES DAILY
COMMUNITY
End: 2022-01-24 | Stop reason: SDUPTHER

## 2021-06-18 RX ORDER — ATORVASTATIN CALCIUM 40 MG/1
40 TABLET, FILM COATED ORAL DAILY
COMMUNITY
End: 2022-01-25

## 2021-06-18 RX ORDER — GENTAMICIN SULFATE 40 MG/ML
INJECTION, SOLUTION INTRAMUSCULAR; INTRAVENOUS
COMMUNITY
End: 2021-06-21

## 2021-06-18 RX ORDER — MULTIVIT WITH MINERALS/LUTEIN
1000 TABLET ORAL DAILY
COMMUNITY

## 2021-06-18 RX ORDER — CIPROFLOXACIN 500 MG/5ML
KIT ORAL
COMMUNITY
End: 2021-06-21

## 2021-06-18 RX ORDER — LEVOTHYROXINE SODIUM 0.07 MG/1
75 TABLET ORAL DAILY
COMMUNITY
End: 2021-06-21 | Stop reason: SDUPTHER

## 2021-06-18 RX ORDER — CIPROFLOXACIN 500 MG/1
500 TABLET, FILM COATED ORAL DAILY
COMMUNITY
End: 2021-06-21

## 2021-06-21 ENCOUNTER — OFFICE VISIT (OUTPATIENT)
Dept: FAMILY MEDICINE CLINIC | Age: 81
End: 2021-06-21

## 2021-06-21 VITALS
BODY MASS INDEX: 26.84 KG/M2 | HEIGHT: 66 IN | TEMPERATURE: 97.8 F | WEIGHT: 167 LBS | DIASTOLIC BLOOD PRESSURE: 54 MMHG | HEART RATE: 67 BPM | SYSTOLIC BLOOD PRESSURE: 132 MMHG

## 2021-06-21 DIAGNOSIS — E11.9 TYPE 2 DIABETES MELLITUS WITHOUT COMPLICATION, WITHOUT LONG-TERM CURRENT USE OF INSULIN (HCC): ICD-10-CM

## 2021-06-21 DIAGNOSIS — Z12.31 BREAST CANCER SCREENING BY MAMMOGRAM: ICD-10-CM

## 2021-06-21 DIAGNOSIS — Z78.0 ASYMPTOMATIC POSTMENOPAUSAL STATE: ICD-10-CM

## 2021-06-21 DIAGNOSIS — E03.9 ACQUIRED HYPOTHYROIDISM: ICD-10-CM

## 2021-06-21 DIAGNOSIS — Z00.00 PHYSICAL EXAM: Primary | ICD-10-CM

## 2021-06-21 LAB
EXPIRATION DATE: NORMAL
HBA1C MFR BLD: 6.7 %
Lab: NORMAL

## 2021-06-21 PROCEDURE — G0439 PPPS, SUBSEQ VISIT: HCPCS | Performed by: FAMILY MEDICINE

## 2021-06-21 PROCEDURE — 99213 OFFICE O/P EST LOW 20 MIN: CPT | Performed by: FAMILY MEDICINE

## 2021-06-21 PROCEDURE — 83036 HEMOGLOBIN GLYCOSYLATED A1C: CPT | Performed by: FAMILY MEDICINE

## 2021-06-21 RX ORDER — PANTOPRAZOLE SODIUM 40 MG/1
40 TABLET, DELAYED RELEASE ORAL
COMMUNITY
End: 2021-10-11

## 2021-06-21 RX ORDER — LEVOTHYROXINE SODIUM 0.1 MG/1
100 TABLET ORAL DAILY
COMMUNITY
End: 2021-06-21 | Stop reason: SDUPTHER

## 2021-06-21 RX ORDER — METFORMIN HYDROCHLORIDE 500 MG/1
1000 TABLET, EXTENDED RELEASE ORAL NIGHTLY
COMMUNITY
Start: 2021-03-23 | End: 2021-07-29

## 2021-06-21 RX ORDER — TORSEMIDE 20 MG/1
20 TABLET ORAL DAILY
COMMUNITY

## 2021-06-21 RX ORDER — MELATONIN
1000 DAILY
COMMUNITY

## 2021-06-21 RX ORDER — CEPHALEXIN 250 MG/1
250 CAPSULE ORAL DAILY
COMMUNITY
End: 2022-03-29

## 2021-06-21 RX ORDER — ASCORBIC ACID 250 MG
250 TABLET ORAL DAILY
COMMUNITY

## 2021-06-21 RX ORDER — CHLORAL HYDRATE 500 MG
1 CAPSULE ORAL DAILY
COMMUNITY

## 2021-06-21 RX ORDER — LEVOTHYROXINE SODIUM 0.1 MG/1
100 TABLET ORAL DAILY
Qty: 90 TABLET | Refills: 1 | Status: SHIPPED | OUTPATIENT
Start: 2021-06-21 | End: 2021-10-11

## 2021-06-21 NOTE — PATIENT INSTRUCTIONS

## 2021-06-21 NOTE — PROGRESS NOTES
Subsequent Medicare Wellness Visit  The ABC's of Medicare Wellness Visit    Chief Complaint:  Medicare wellness exam, follow-up on diabetes, cholesterol, hypothyroidism, multiple issues    Subjective   History of Present Illness      Zoila Whipple is a 80 y.o. female who presents   for a Subsequent Medicare Wellness Visit.    Does the patient have evidence of cognitive impairment?   No    Asprin use counseling:Does not need ASA (and currently is not on it)    Recent Hospitalizations:  No hospitalization(s) within the last year.    Advanced Care Planning:  ACP discussion was held with the patient during this visit. Patient has an advance directive in EMR which is still valid.     The following portions of the patient's history were reviewed   and updated as appropriate: allergies, current medications, past family history, past medical history, past social history, past surgical history and problem list.    Compared to one year ago, the patient feels her   physical health is the same.  Compared to one year ago, the patient feels her   mental health is the same.    Reviewed chart for potential of high risk medication in the elderly:  yes  Reviewed chart for potential of harmful drug interactions in the elderly:  yes    Patient's Body mass index is 26.97 kg/m². indicating that she is overweight (BMI 25-29.9). Obesity-related health conditions include the following: hypertension, diabetes mellitus and dyslipidemias. Obesity is unchanged. BMI is is above average; no BMI management plan is appropriate. We discussed portion control and increasing exercise..       HEALTH RISK ASSESSMENT BEGIN  Fall Risk Screen:  STEADI Fall Risk Assessment has not been completed.    Depression Screen:   PHQ-2/PHQ-9 Depression Screening 6/21/2021   Little interest or pleasure in doing things 0   Feeling down, depressed, or hopeless 0   Total Score 0       Current Medical Providers:  Patient Care Team:  Jewel Chavez MD as PCP -  General (Family Medicine)    Smoking Status:  Social History     Tobacco Use   Smoking Status Never Smoker       Alcohol Consumption:  Social History     Substance and Sexual Activity   Alcohol Use Never       Health Habits and Functional and Cognitive Screening:  Functional & Cognitive Status 6/21/2021   Do you have difficulty preparing food and eating? No   Do you have difficulty bathing yourself, getting dressed or grooming yourself? No   Do you have difficulty using the toilet? No   Do you have difficulty moving around from place to place? No   Do you have trouble with steps or getting out of a bed or a chair? No   Current Diet Well Balanced Diet   Dental Exam Up to date   Eye Exam Up to date   Exercise (times per week) 0 times per week   Current Exercises Include No Regular Exercise   Do you need help using the phone?  No   Are you deaf or do you have serious difficulty hearing?  No   Do you need help with transportation? No   Do you need help shopping? No   Do you need help preparing meals?  No   Do you need help with housework?  No   Do you need help with laundry? No   Do you need help taking your medications? No   Do you need help managing money? No   Do you ever drive or ride in a car without wearing a seat belt? No   Have you felt unusual stress, anger or loneliness in the last month? No   Who do you live with? Alone   If you need help, do you have trouble finding someone available to you? No   Have you been bothered in the last four weeks by sexual problems? No   Do you have difficulty concentrating, remembering or making decisions? No       Age-appropriate Screening Schedule:  Refer to the list below for future screening recommendations based on patient's age, sex and/or medical conditions. Orders for these recommended tests are listed in the plan section. The patient has been provided with a written plan.    Health Maintenance   Topic Date Due   • TDAP/TD VACCINES (1 - Tdap) Never done   • ZOSTER  VACCINE (1 of 2) Never done   • URINE MICROALBUMIN  05/28/2021   • DIABETIC EYE EXAM  06/18/2021   • DXA SCAN  06/27/2021   • INFLUENZA VACCINE  08/01/2021   • HEMOGLOBIN A1C  09/10/2021   • LIPID PANEL  03/10/2022     HEALTH RISK ASSESSMENT END    Outpatient Medications Prior to Visit   Medication Sig Dispense Refill   • ascorbic acid (VITAMIN C) 250 MG tablet Take 250 mg by mouth Daily.     • cephalexin (KEFLEX) 250 MG capsule Take 250 mg by mouth Daily.     • cholecalciferol (Cholecalciferol) 25 MCG (1000 UT) tablet Take 1,000 Units by mouth Daily.     • lisinopril (PRINIVIL,ZESTRIL) 20 MG tablet Take 20 mg by mouth Daily.     • metFORMIN ER (GLUCOPHAGE-XR) 500 MG 24 hr tablet Take 1,000 mg by mouth Every Night.     • metoprolol tartrate (LOPRESSOR) 25 MG tablet Take 25 mg by mouth 2 (Two) Times a Day.     • Omega-3 Fatty Acids (fish oil) 1000 MG capsule capsule Take 1 capsule by mouth Daily.     • pantoprazole (PROTONIX) 40 MG EC tablet Take 40 mg by mouth.     • potassium chloride (K-DUR,KLOR-CON) 20 MEQ CR tablet Take 20 mEq by mouth 2 (Two) Times a Day.     • torsemide (DEMADEX) 20 MG tablet Take 20 mg by mouth Daily.     • vitamin E 1000 UNIT capsule      • levothyroxine (SYNTHROID, LEVOTHROID) 100 MCG tablet Take 100 mcg by mouth Daily.     • atorvastatin (LIPITOR) 40 MG tablet Take 40 mg by mouth Daily.     • ciprofloxacin (CIPRO) 500 MG tablet Take 500 mg by mouth Daily.     • ciprofloxacin (CIPRO) 500 MG/5ML (10%) suspension      • erythromycin (ROMYCIN) 5 MG/GM ophthalmic ointment      • ferrous sulfate 325 (65 FE) MG tablet      • gentamicin (GARAMYCIN) 40 MG/ML injection      • levothyroxine (Synthroid) 75 MCG tablet Take 75 mcg by mouth Daily.     • meclizine (ANTIVERT) 25 MG tablet      • warfarin (COUMADIN) 5 MG tablet        No facility-administered medications prior to visit.       Patient Active Problem List   Diagnosis   • Type 2 diabetes mellitus (CMS/HCC)   • S/P aortic valve replacement with  "prosthetic valve   • Mixed hyperlipidemia   • GERD without esophagitis   • Acquired hypothyroidism   • Essential hypertension   • H/O aortic valve replacement with porcine valve       In addition to the above, Zoila Dixon is in today for follow-up on her diabetes.  She is currently taking Metformin 1000 mg daily.  She does check her sugar twice daily and is noted it to be between roughly 110 and 130.  She has not had any dropping of the blood sugar.  Her most recent A1c is noted.    She also has a history of hypothyroidism as noted.  She does remain on treatment for this and is doing well.    She also has a history of hypertension and remains on treatment for this.    She does describe having an episode of facial numbness in the last 10 days.  She says that the right side of the face including the upper lip and up into the cheek was numb for 15 to 20 minutes.  This resolved spontaneously.  She has no stroke history.  She has had fairly recent carotid testing that is in the last 3 years.  There was not any evidence of significant blockage at that time.      Review of Systems:  Review of Systems   Constitutional: Negative for chills and fever.   Respiratory: Negative for cough and shortness of breath.    Cardiovascular: Negative for chest pain and palpitations.   Gastrointestinal: Negative for abdominal pain, nausea and vomiting.        Objective      Vitals:    06/21/21 1059   BP: 132/54   BP Location: Right arm   Patient Position: Sitting   Pulse: 67   Temp: 97.8 °F (36.6 °C)   TempSrc: Oral   Weight: 75.8 kg (167 lb)   Height: 167.6 cm (65.98\")       Physical Exam:  Physical Exam  Vitals and nursing note reviewed.   Constitutional:       General: She is not in acute distress.     Appearance: She is not ill-appearing.   HENT:      Right Ear: Tympanic membrane and ear canal normal.      Left Ear: Tympanic membrane and ear canal normal.      Ears:      Comments: Her hearing is very good with aids in place.     " Mouth/Throat:      Mouth: Mucous membranes are moist.      Comments: Pharynx appears normal  Eyes:      Extraocular Movements: Extraocular movements intact.      Pupils: Pupils are equal, round, and reactive to light.      Comments: Binocular vision is 20/25.   Neck:      Thyroid: No thyromegaly.   Cardiovascular:      Rate and Rhythm: Normal rate and regular rhythm.      Pulses:           Dorsalis pedis pulses are 2+ on the right side and 2+ on the left side.      Heart sounds: Murmur (4+ systolic murmur is noted) heard.     Pulmonary:      Effort: Pulmonary effort is normal.      Breath sounds: Normal breath sounds.   Abdominal:      General: There is no distension.      Palpations: Abdomen is soft. There is no mass.      Tenderness: There is no abdominal tenderness.   Musculoskeletal:      Cervical back: Normal range of motion.   Feet:      Right foot:      Protective Sensation: 3 sites tested. 3 sites sensed.      Skin integrity: Skin integrity normal.      Left foot:      Protective Sensation: 3 sites tested. 3 sites sensed.      Skin integrity: Skin integrity normal.   Skin:     Findings: No lesion or rash.   Neurological:      General: No focal deficit present.      Mental Status: She is oriented to person, place, and time.      Cranial Nerves: No cranial nerve deficit.   Psychiatric:         Mood and Affect: Mood normal.         Diabetic Foot Exam Performed and Monofilament Test Performed    Result Review :           The data below has been reviewed by Jewel Chavez MD on 06/21/2021.    Lab Results   Component Value Date    BUN 15 03/10/2021    CREATININE 0.64 03/10/2021    BCR 23 (H) 03/10/2021    K 4.4 03/10/2021    CO2 26 03/10/2021    CALCIUM 9.2 03/10/2021    ALBUMIN 3.9 03/10/2021    LABIL2 1.4 03/10/2021    AST 18 03/10/2021    ALT 13 03/10/2021     Lab Results   Component Value Date    HGBA1C 8.3 (H) 03/10/2021     Lab Results   Component Value Date    CHLPL 119 03/10/2021    TRIG 119  03/10/2021    HDL 39 (L) 03/10/2021    LDL 56 (L) 03/10/2021     Lab Results   Component Value Date    WBC 5.94 03/22/2021    HGB 13.10 03/22/2021    HCT 39.8 03/22/2021    MCV 85.8 03/22/2021    .00 03/22/2021     Lab Results   Component Value Date    TSH 0.353 03/10/2021                 Assessment/Plan   Assessment and Plan:   Today, we have reviewed Zoila zee.  Please see above and below for detailed information.  Overall she is doing very well.  This recent episode of facial numbness is a bit concerning.  She has had fairly recent carotid testing though.  She is also had some evaluation of her heart done.  After considering this, I am going to recommend she take 81 mg enteric-coated aspirin on a daily basis.  She has recurrent episodes of this facial numbness and we may need to consider additional testing such as MRI.  However, her exam is very reassuring.  Otherwise, we will repeat testing related to her diabetes.  Her thyroid medication will be refilled.  Additional screenings will be advised as noted below.         Diagnoses and all orders for this visit:    1. Physical exam (Primary)    2. Type 2 diabetes mellitus without complication, without long-term current use of insulin (CMS/[facility])  -     POC Glycosylated Hemoglobin (Hb A1C)    3. Breast cancer screening by mammogram  -     Mammo Screening Bilateral With CAD; Future    4. Asymptomatic postmenopausal state  -     DEXA Bone Density Axial; Future    5. Acquired hypothyroidism  -     levothyroxine (SYNTHROID, LEVOTHROID) 100 MCG tablet; Take 1 tablet by mouth Daily.  Dispense: 90 tablet; Refill: 1        Medicare Risks and Personalized Health Plan  CMS Preventative Services Quick Reference  Cardiovascular risk  Fall Risk  Osteoporosis Risk    The above risks/problems have been discussed with the patient.  Pertinent information has been shared with the patient in the   After Visit Summary. Follow up plans and orders are seen above   in the  Assessment/Plan Section.        Follow Up    Return in about 3 months (around 9/21/2021).     An After Visit Summary and PPPS were given to the patient.

## 2021-07-01 VITALS
HEART RATE: 67 BPM | SYSTOLIC BLOOD PRESSURE: 136 MMHG | DIASTOLIC BLOOD PRESSURE: 55 MMHG | WEIGHT: 166.8 LBS | BODY MASS INDEX: 26.81 KG/M2 | HEIGHT: 66 IN | TEMPERATURE: 97.6 F

## 2021-07-01 VITALS
HEART RATE: 64 BPM | TEMPERATURE: 97.6 F | BODY MASS INDEX: 25.76 KG/M2 | HEIGHT: 66 IN | WEIGHT: 160.3 LBS | SYSTOLIC BLOOD PRESSURE: 131 MMHG | DIASTOLIC BLOOD PRESSURE: 51 MMHG

## 2021-07-01 VITALS
BODY MASS INDEX: 26.55 KG/M2 | WEIGHT: 165.2 LBS | DIASTOLIC BLOOD PRESSURE: 52 MMHG | TEMPERATURE: 97.3 F | HEIGHT: 66 IN | SYSTOLIC BLOOD PRESSURE: 164 MMHG | HEART RATE: 64 BPM

## 2021-07-01 VITALS
HEIGHT: 66 IN | HEART RATE: 68 BPM | TEMPERATURE: 98.3 F | DIASTOLIC BLOOD PRESSURE: 53 MMHG | BODY MASS INDEX: 27.55 KG/M2 | WEIGHT: 171.4 LBS | SYSTOLIC BLOOD PRESSURE: 146 MMHG

## 2021-07-01 VITALS
HEART RATE: 57 BPM | SYSTOLIC BLOOD PRESSURE: 148 MMHG | DIASTOLIC BLOOD PRESSURE: 48 MMHG | WEIGHT: 172.4 LBS | HEIGHT: 66 IN | TEMPERATURE: 97.5 F | BODY MASS INDEX: 27.71 KG/M2

## 2021-07-01 VITALS
SYSTOLIC BLOOD PRESSURE: 120 MMHG | WEIGHT: 171.8 LBS | DIASTOLIC BLOOD PRESSURE: 40 MMHG | HEART RATE: 70 BPM | HEIGHT: 66 IN | BODY MASS INDEX: 27.61 KG/M2 | TEMPERATURE: 97.7 F

## 2021-07-01 VITALS
TEMPERATURE: 97.6 F | BODY MASS INDEX: 27.19 KG/M2 | DIASTOLIC BLOOD PRESSURE: 45 MMHG | WEIGHT: 169.2 LBS | HEART RATE: 65 BPM | SYSTOLIC BLOOD PRESSURE: 167 MMHG | HEIGHT: 66 IN

## 2021-07-01 VITALS
TEMPERATURE: 97.6 F | DIASTOLIC BLOOD PRESSURE: 63 MMHG | WEIGHT: 161 LBS | HEART RATE: 76 BPM | BODY MASS INDEX: 25.88 KG/M2 | SYSTOLIC BLOOD PRESSURE: 134 MMHG | HEIGHT: 66 IN

## 2021-07-01 VITALS
HEART RATE: 68 BPM | WEIGHT: 159.8 LBS | SYSTOLIC BLOOD PRESSURE: 148 MMHG | TEMPERATURE: 96 F | DIASTOLIC BLOOD PRESSURE: 67 MMHG | HEIGHT: 66 IN | BODY MASS INDEX: 25.68 KG/M2

## 2021-07-01 VITALS
SYSTOLIC BLOOD PRESSURE: 137 MMHG | DIASTOLIC BLOOD PRESSURE: 56 MMHG | HEART RATE: 69 BPM | TEMPERATURE: 97.5 F | BODY MASS INDEX: 27.1 KG/M2 | WEIGHT: 168.6 LBS | HEIGHT: 66 IN

## 2021-07-01 VITALS
SYSTOLIC BLOOD PRESSURE: 137 MMHG | TEMPERATURE: 97.4 F | WEIGHT: 169.6 LBS | DIASTOLIC BLOOD PRESSURE: 51 MMHG | HEIGHT: 66 IN | HEART RATE: 61 BPM | BODY MASS INDEX: 27.26 KG/M2

## 2021-07-01 VITALS
HEIGHT: 66 IN | BODY MASS INDEX: 25.99 KG/M2 | DIASTOLIC BLOOD PRESSURE: 68 MMHG | HEART RATE: 83 BPM | TEMPERATURE: 97.5 F | WEIGHT: 161.7 LBS | SYSTOLIC BLOOD PRESSURE: 129 MMHG

## 2021-07-01 VITALS
DIASTOLIC BLOOD PRESSURE: 76 MMHG | HEIGHT: 66 IN | HEART RATE: 60 BPM | TEMPERATURE: 97.6 F | BODY MASS INDEX: 25.84 KG/M2 | WEIGHT: 160.8 LBS | SYSTOLIC BLOOD PRESSURE: 138 MMHG

## 2021-07-02 VITALS
DIASTOLIC BLOOD PRESSURE: 62 MMHG | WEIGHT: 178.4 LBS | TEMPERATURE: 97.9 F | BODY MASS INDEX: 28.67 KG/M2 | SYSTOLIC BLOOD PRESSURE: 150 MMHG | HEIGHT: 66 IN

## 2021-07-02 VITALS
TEMPERATURE: 98.1 F | DIASTOLIC BLOOD PRESSURE: 62 MMHG | HEIGHT: 66 IN | HEART RATE: 68 BPM | SYSTOLIC BLOOD PRESSURE: 156 MMHG | BODY MASS INDEX: 27.68 KG/M2 | OXYGEN SATURATION: 96 % | WEIGHT: 172.2 LBS

## 2021-07-02 VITALS
TEMPERATURE: 98.1 F | WEIGHT: 168.8 LBS | SYSTOLIC BLOOD PRESSURE: 120 MMHG | DIASTOLIC BLOOD PRESSURE: 60 MMHG | HEART RATE: 64 BPM | HEIGHT: 66 IN | BODY MASS INDEX: 27.13 KG/M2

## 2021-07-02 VITALS
HEART RATE: 72 BPM | HEIGHT: 66 IN | DIASTOLIC BLOOD PRESSURE: 60 MMHG | SYSTOLIC BLOOD PRESSURE: 120 MMHG | WEIGHT: 170.8 LBS | BODY MASS INDEX: 27.45 KG/M2 | OXYGEN SATURATION: 96 % | TEMPERATURE: 98.5 F

## 2021-07-02 VITALS
HEIGHT: 66 IN | BODY MASS INDEX: 26.93 KG/M2 | DIASTOLIC BLOOD PRESSURE: 62 MMHG | SYSTOLIC BLOOD PRESSURE: 118 MMHG | WEIGHT: 167.6 LBS | TEMPERATURE: 97.5 F | HEART RATE: 67 BPM

## 2021-07-02 VITALS
HEIGHT: 66 IN | BODY MASS INDEX: 27.9 KG/M2 | SYSTOLIC BLOOD PRESSURE: 160 MMHG | HEART RATE: 70 BPM | TEMPERATURE: 97.6 F | DIASTOLIC BLOOD PRESSURE: 68 MMHG | WEIGHT: 173.6 LBS

## 2021-07-02 VITALS
HEIGHT: 66 IN | TEMPERATURE: 97.8 F | HEART RATE: 64 BPM | BODY MASS INDEX: 27.61 KG/M2 | SYSTOLIC BLOOD PRESSURE: 130 MMHG | DIASTOLIC BLOOD PRESSURE: 70 MMHG | WEIGHT: 171.8 LBS

## 2021-07-06 ENCOUNTER — HOSPITAL ENCOUNTER (OUTPATIENT)
Dept: BONE DENSITY | Facility: HOSPITAL | Age: 81
Discharge: HOME | End: 2021-07-06

## 2021-07-06 ENCOUNTER — HOSPITAL ENCOUNTER (OUTPATIENT)
Dept: MAMMOGRAPHY | Facility: HOSPITAL | Age: 81
Discharge: HOME | End: 2021-07-06

## 2021-07-06 DIAGNOSIS — Z78.0 ASYMPTOMATIC POSTMENOPAUSAL STATE: ICD-10-CM

## 2021-07-06 DIAGNOSIS — Z12.31 BREAST CANCER SCREENING BY MAMMOGRAM: ICD-10-CM

## 2021-07-06 PROCEDURE — 77067 SCR MAMMO BI INCL CAD: CPT

## 2021-07-06 PROCEDURE — 77080 DXA BONE DENSITY AXIAL: CPT | Performed by: RADIOLOGY

## 2021-07-06 PROCEDURE — 77080 DXA BONE DENSITY AXIAL: CPT

## 2021-07-06 PROCEDURE — 77063 BREAST TOMOSYNTHESIS BI: CPT

## 2021-07-29 RX ORDER — METFORMIN HYDROCHLORIDE 500 MG/1
TABLET, EXTENDED RELEASE ORAL
Qty: 180 TABLET | Refills: 1 | Status: SHIPPED | OUTPATIENT
Start: 2021-07-29 | End: 2021-12-27

## 2021-07-29 RX ORDER — LISINOPRIL 20 MG/1
20 TABLET ORAL DAILY
Qty: 90 TABLET | Refills: 1 | Status: SHIPPED | OUTPATIENT
Start: 2021-07-29 | End: 2022-01-24 | Stop reason: SDUPTHER

## 2021-09-29 ENCOUNTER — CLINICAL SUPPORT (OUTPATIENT)
Dept: FAMILY MEDICINE CLINIC | Age: 81
End: 2021-09-29

## 2021-09-29 DIAGNOSIS — Z23 NEED FOR INFLUENZA VACCINATION: Primary | ICD-10-CM

## 2021-09-29 PROCEDURE — 90662 IIV NO PRSV INCREASED AG IM: CPT | Performed by: FAMILY MEDICINE

## 2021-09-29 PROCEDURE — G0008 ADMIN INFLUENZA VIRUS VAC: HCPCS | Performed by: FAMILY MEDICINE

## 2021-10-10 DIAGNOSIS — E03.9 ACQUIRED HYPOTHYROIDISM: ICD-10-CM

## 2021-10-11 RX ORDER — LEVOTHYROXINE SODIUM 0.1 MG/1
TABLET ORAL
Qty: 90 TABLET | Refills: 1 | Status: SHIPPED | OUTPATIENT
Start: 2021-10-11 | End: 2022-01-24 | Stop reason: SDUPTHER

## 2021-10-11 RX ORDER — PANTOPRAZOLE SODIUM 40 MG/1
TABLET, DELAYED RELEASE ORAL
Qty: 90 TABLET | Refills: 0 | Status: SHIPPED | OUTPATIENT
Start: 2021-10-11 | End: 2021-12-27

## 2021-10-21 RX ORDER — ROSUVASTATIN CALCIUM 5 MG/1
TABLET, COATED ORAL
Qty: 90 TABLET | Refills: 0 | Status: SHIPPED | OUTPATIENT
Start: 2021-10-21 | End: 2022-03-22 | Stop reason: SDUPTHER

## 2021-12-03 ENCOUNTER — CLINICAL SUPPORT (OUTPATIENT)
Dept: FAMILY MEDICINE CLINIC | Age: 81
End: 2021-12-03

## 2021-12-03 DIAGNOSIS — Z23 ENCOUNTER FOR IMMUNIZATION: Primary | ICD-10-CM

## 2021-12-03 PROCEDURE — 0003A COVID-19 (PFIZER): CPT | Performed by: FAMILY MEDICINE

## 2021-12-03 PROCEDURE — 91300 COVID-19 (PFIZER): CPT | Performed by: FAMILY MEDICINE

## 2021-12-27 RX ORDER — PANTOPRAZOLE SODIUM 40 MG/1
TABLET, DELAYED RELEASE ORAL
Qty: 90 TABLET | Refills: 0 | Status: SHIPPED | OUTPATIENT
Start: 2021-12-27 | End: 2022-01-24 | Stop reason: SDUPTHER

## 2021-12-27 RX ORDER — METFORMIN HYDROCHLORIDE 500 MG/1
1000 TABLET, EXTENDED RELEASE ORAL
Qty: 180 TABLET | Refills: 0 | Status: SHIPPED | OUTPATIENT
Start: 2021-12-27 | End: 2022-01-24 | Stop reason: SDUPTHER

## 2022-01-24 ENCOUNTER — OFFICE VISIT (OUTPATIENT)
Dept: FAMILY MEDICINE CLINIC | Age: 82
End: 2022-01-24

## 2022-01-24 ENCOUNTER — HOSPITAL ENCOUNTER (OUTPATIENT)
Dept: GENERAL RADIOLOGY | Facility: HOSPITAL | Age: 82
Discharge: HOME | End: 2022-01-24

## 2022-01-24 ENCOUNTER — LAB (OUTPATIENT)
Dept: LAB | Facility: HOSPITAL | Age: 82
End: 2022-01-24

## 2022-01-24 VITALS
DIASTOLIC BLOOD PRESSURE: 64 MMHG | BODY MASS INDEX: 25.94 KG/M2 | TEMPERATURE: 97.5 F | WEIGHT: 161.4 LBS | HEIGHT: 66 IN | SYSTOLIC BLOOD PRESSURE: 134 MMHG | HEART RATE: 70 BPM

## 2022-01-24 DIAGNOSIS — E11.9 TYPE 2 DIABETES MELLITUS WITHOUT COMPLICATION, WITHOUT LONG-TERM CURRENT USE OF INSULIN: Primary | ICD-10-CM

## 2022-01-24 DIAGNOSIS — E03.9 ACQUIRED HYPOTHYROIDISM: ICD-10-CM

## 2022-01-24 DIAGNOSIS — I10 ESSENTIAL HYPERTENSION: ICD-10-CM

## 2022-01-24 DIAGNOSIS — E78.2 MIXED HYPERLIPIDEMIA: ICD-10-CM

## 2022-01-24 DIAGNOSIS — E11.9 TYPE 2 DIABETES MELLITUS WITHOUT COMPLICATION, WITHOUT LONG-TERM CURRENT USE OF INSULIN: ICD-10-CM

## 2022-01-24 DIAGNOSIS — R07.89 CHEST WALL PAIN: ICD-10-CM

## 2022-01-24 LAB
ALBUMIN SERPL-MCNC: 3.8 G/DL (ref 3.5–5.2)
ALBUMIN UR-MCNC: 3.1 MG/DL
ALBUMIN/GLOB SERPL: 1.3 G/DL
ALP SERPL-CCNC: 118 U/L (ref 39–117)
ALT SERPL W P-5'-P-CCNC: 14 U/L (ref 1–33)
ANION GAP SERPL CALCULATED.3IONS-SCNC: 10.1 MMOL/L (ref 5–15)
AST SERPL-CCNC: 17 U/L (ref 1–32)
BILIRUB SERPL-MCNC: 0.3 MG/DL (ref 0–1.2)
BUN SERPL-MCNC: 20 MG/DL (ref 8–23)
BUN/CREAT SERPL: 22.7 (ref 7–25)
CALCIUM SPEC-SCNC: 9.2 MG/DL (ref 8.6–10.5)
CHLORIDE SERPL-SCNC: 104 MMOL/L (ref 98–107)
CHOLEST SERPL-MCNC: 106 MG/DL (ref 0–200)
CO2 SERPL-SCNC: 25.9 MMOL/L (ref 22–29)
CREAT SERPL-MCNC: 0.88 MG/DL (ref 0.57–1)
DEPRECATED RDW RBC AUTO: 41.9 FL (ref 37–54)
ERYTHROCYTE [DISTWIDTH] IN BLOOD BY AUTOMATED COUNT: 13.4 % (ref 12.3–15.4)
GFR SERPL CREATININE-BSD FRML MDRD: 61 ML/MIN/1.73
GLOBULIN UR ELPH-MCNC: 3 GM/DL
GLUCOSE SERPL-MCNC: 112 MG/DL (ref 65–99)
HBA1C MFR BLD: 7.61 % (ref 4.8–5.6)
HCT VFR BLD AUTO: 36.7 % (ref 34–46.6)
HDLC SERPL-MCNC: 31 MG/DL (ref 40–60)
HGB BLD-MCNC: 11.9 G/DL (ref 12–15.9)
LDLC SERPL CALC-MCNC: 34 MG/DL (ref 0–100)
LDLC/HDLC SERPL: 0.66 {RATIO}
MCH RBC QN AUTO: 27.4 PG (ref 26.6–33)
MCHC RBC AUTO-ENTMCNC: 32.4 G/DL (ref 31.5–35.7)
MCV RBC AUTO: 84.4 FL (ref 79–97)
PLATELET # BLD AUTO: 168 10*3/MM3 (ref 140–450)
PMV BLD AUTO: 10.9 FL (ref 6–12)
POTASSIUM SERPL-SCNC: 4.4 MMOL/L (ref 3.5–5.2)
PROT SERPL-MCNC: 6.8 G/DL (ref 6–8.5)
RBC # BLD AUTO: 4.35 10*6/MM3 (ref 3.77–5.28)
SODIUM SERPL-SCNC: 140 MMOL/L (ref 136–145)
T4 FREE SERPL-MCNC: 1.63 NG/DL (ref 0.93–1.7)
TRIGL SERPL-MCNC: 272 MG/DL (ref 0–150)
TSH SERPL DL<=0.05 MIU/L-ACNC: 0.04 UIU/ML (ref 0.27–4.2)
VLDLC SERPL-MCNC: 41 MG/DL (ref 5–40)
WBC NRBC COR # BLD: 6.31 10*3/MM3 (ref 3.4–10.8)

## 2022-01-24 PROCEDURE — 84443 ASSAY THYROID STIM HORMONE: CPT

## 2022-01-24 PROCEDURE — 80053 COMPREHEN METABOLIC PANEL: CPT

## 2022-01-24 PROCEDURE — 71046 X-RAY EXAM CHEST 2 VIEWS: CPT

## 2022-01-24 PROCEDURE — 82043 UR ALBUMIN QUANTITATIVE: CPT

## 2022-01-24 PROCEDURE — 99214 OFFICE O/P EST MOD 30 MIN: CPT | Performed by: FAMILY MEDICINE

## 2022-01-24 PROCEDURE — 80061 LIPID PANEL: CPT

## 2022-01-24 PROCEDURE — 83036 HEMOGLOBIN GLYCOSYLATED A1C: CPT

## 2022-01-24 PROCEDURE — 84439 ASSAY OF FREE THYROXINE: CPT

## 2022-01-24 PROCEDURE — 85027 COMPLETE CBC AUTOMATED: CPT

## 2022-01-24 PROCEDURE — 36415 COLL VENOUS BLD VENIPUNCTURE: CPT

## 2022-01-24 RX ORDER — POTASSIUM CHLORIDE 750 MG/1
10 TABLET, FILM COATED, EXTENDED RELEASE ORAL DAILY
COMMUNITY
Start: 2021-07-29

## 2022-01-24 RX ORDER — LEVOTHYROXINE SODIUM 0.1 MG/1
100 TABLET ORAL DAILY
Qty: 90 TABLET | Refills: 1 | Status: SHIPPED | OUTPATIENT
Start: 2022-01-24 | End: 2022-01-25 | Stop reason: SDUPTHER

## 2022-01-24 RX ORDER — METFORMIN HYDROCHLORIDE 500 MG/1
1000 TABLET, EXTENDED RELEASE ORAL
Qty: 180 TABLET | Refills: 0 | Status: SHIPPED | OUTPATIENT
Start: 2022-01-24 | End: 2022-08-04 | Stop reason: SDUPTHER

## 2022-01-24 RX ORDER — LISINOPRIL 20 MG/1
20 TABLET ORAL DAILY
Qty: 90 TABLET | Refills: 1 | Status: SHIPPED | OUTPATIENT
Start: 2022-01-24 | End: 2022-08-04 | Stop reason: SDUPTHER

## 2022-01-24 RX ORDER — PANTOPRAZOLE SODIUM 40 MG/1
40 TABLET, DELAYED RELEASE ORAL DAILY
Qty: 90 TABLET | Refills: 1 | Status: SHIPPED | OUTPATIENT
Start: 2022-01-24 | End: 2022-08-04 | Stop reason: SDUPTHER

## 2022-01-24 NOTE — PATIENT INSTRUCTIONS
Diabetes Mellitus Basics    Diabetes mellitus, or diabetes, is a long-term (chronic) disease. It occurs when the body does not properly use sugar (glucose) that is released from food after you eat.  Diabetes mellitus may be caused by one or both of these problems:  · Your pancreas does not make enough of a hormone called insulin.  · Your body does not react in a normal way to the insulin that it makes.  Insulin lets glucose enter cells in your body. This gives you energy. If you have diabetes, glucose cannot get into cells. This causes high blood glucose (hyperglycemia).  How to treat and manage diabetes  You may need to take insulin or other diabetes medicines daily to keep your glucose in balance. If you are prescribed insulin, you will learn how to give yourself insulin by injection. You may need to adjust the amount of insulin you take based on the foods that you eat.  You will need to check your blood glucose levels using a glucose monitor as told by your health care provider. The readings can help determine if you have low or high blood glucose.  Generally, you should have these blood glucose levels:  · Before meals (preprandial):  mg/dL (4.4-7.2 mmol/L).  · After meals (postprandial): below 180 mg/dL (10 mmol/L).  · Hemoglobin A1c (HbA1c) level: less than 7%.  Your health care provider will set treatment goals for you.  Keep all follow-up visits. This is important.  Follow these instructions at home:  Diabetes medicines  Take your diabetes medicines every day as told by your health care provider. List your diabetes medicines here:  · Name of medicine: ______________________________  ? Amount (dose): _______________ Time (a.m./p.m.): _______________ Notes: ___________________________________  · Name of medicine: ______________________________  ? Amount (dose): _______________ Time (a.m./p.m.): _______________ Notes: ___________________________________  · Name of medicine:  ______________________________  ? Amount (dose): _______________ Time (a.m./p.m.): _______________ Notes: ___________________________________  Insulin  If you use insulin, list the types of insulin you use here:  · Insulin type: ______________________________  ? Amount (dose): _______________ Time (a.m./p.m.): _______________Notes: ___________________________________  · Insulin type: ______________________________  ? Amount (dose): _______________ Time (a.m./p.m.): _______________ Notes: ___________________________________  · Insulin type: ______________________________  ? Amount (dose): _______________ Time (a.m./p.m.): _______________ Notes: ___________________________________  · Insulin type: ______________________________  ? Amount (dose): _______________ Time (a.m./p.m.): _______________ Notes: ___________________________________  · Insulin type: ______________________________  ? Amount (dose): _______________ Time (a.m./p.m.): _______________ Notes: ___________________________________  Managing blood glucose    Check your blood glucose levels using a glucose monitor as told by your health care provider.  Write down the times that you check your glucose levels here:  · Time: _______________ Notes: ___________________________________  · Time: _______________ Notes: ___________________________________  · Time: _______________ Notes: ___________________________________  · Time: _______________ Notes: ___________________________________  · Time: _______________ Notes: ___________________________________  · Time: _______________ Notes: ___________________________________    Low blood glucose  Low blood glucose (hypoglycemia) is when glucose is at or below 70 mg/dL (3.9 mmol/L). Symptoms may include:  · Feeling:  ? Hungry.  ? Sweaty and clammy.  ? Irritable or easily upset.  ? Dizzy.  ? Sleepy.  · Having:  ? A fast heartbeat.  ? A headache.  ? A change in your vision.  ? Numbness around the mouth, lips, or  tongue.  · Having trouble with:  ? Moving (coordination).  ? Sleeping.  Treating low blood glucose  To treat low blood glucose, eat or drink something containing sugar right away. If you can think clearly and swallow safely, follow the 15:15 rule:  · Take 15 grams of a fast-acting carb (carbohydrate), as told by your health care provider.  · Some fast-acting carbs are:  ? Glucose tablets: take 3-4 tablets.  ? Hard candy: eat 3-5 pieces.  ? Fruit juice: drink 4 oz (120 mL).  ? Regular (not diet) soda: drink 4-6 oz (120-180 mL).  ? Honey or sugar: eat 1 Tbsp (15 mL).  · Check your blood glucose levels 15 minutes after you take the carb.  · If your glucose is still at or below 70 mg/dL (3.9 mmol/L), take 15 grams of a carb again.  · If your glucose does not go above 70 mg/dL (3.9 mmol/L) after 3 tries, get help right away.  · After your glucose goes back to normal, eat a meal or a snack within 1 hour.  Treating very low blood glucose  If your glucose is at or below 54 mg/dL (3 mmol/L), you have very low blood glucose (severe hypoglycemia).  This is an emergency. Do not wait to see if the symptoms will go away. Get medical help right away. Call your local emergency services (911 in the [place] Do not drive yourself to the hospital.  Questions to ask your health care provider  · Should I talk with a diabetes educator?  · What equipment will I need to care for myself at home?  · What diabetes medicines do I need? When should I take them?  · How often do I need to check my blood glucose levels?  · What number can I call if I have questions?  · When is my follow-up visit?  · Where can I find a support group for people with diabetes?  Where to find more information  · American Diabetes Association: www.diabetes.org  · Association of Diabetes Care and Education Specialists: www.diabeteseducator.org  Contact a health care provider if:  · Your blood glucose is at or above 240 mg/dL (13.3 mmol/L) for 2 days in a row.  · You have  been sick or have had a fever for 2 days or more, and you are not getting better.  · You have any of these problems for more than 6 hours:  ? You cannot eat or drink.  ? You feel nauseous.  ? You vomit.  ? You have diarrhea.  Get help right away if:  · Your blood glucose is lower than 54 mg/dL (3 mmol/L).  · You get confused.  · You have trouble thinking clearly.  · You have trouble breathing.  These symptoms may represent a serious problem that is an emergency. Do not wait to see if the symptoms will go away. Get medical help right away. Call your local emergency services (911 in the [place] Do not drive yourself to the hospital.  Summary  · Diabetes mellitus is a chronic disease that occurs when the body does not properly use sugar (glucose) that is released from food after you eat.  · Take insulin and diabetes medicines as told.  · Check your blood glucose every day, as often as told.  · Keep all follow-up visits. This is important.  This information is not intended to replace advice given to you by your health care provider. Make sure you discuss any questions you have with your health care provider.  Document Revised: 04/20/2021 Document Reviewed: 04/20/2021  [employer] Patient Education © 2021 [employer]

## 2022-01-24 NOTE — PROGRESS NOTES
Zoila Whipple presents to [facility].    Chief Complaint:  Diabetes, thyroid, cholesterol, blood pressure    Subjective       History of Present Illness:  Zoila Dixon is in today for follow up on her usual care.  She does have a history of type 2 diabetes for which she currently takes Metformin.  She does check her sugar most days.  It typically runs in the 120s on average.  She is due for blood work today.    She also has a history of hypertension, hypothyroidism, elevated cholesterol.  She remains on medication for these problems.    She also has a spot in her right chest that is painful.  This has been bothering her for about the last month.  She is unaware of any specific injury that might of caused this.  She denies any difficulty breathing associated with this.  This pain does come and go.  When it hurts, she does have to hold it.  She denies any palpable mass.      Review of Systems:  Review of Systems   Constitutional: Negative for chills and fever.   Respiratory: Negative for cough and shortness of breath.    Cardiovascular: Positive for chest pain. Negative for palpitations.   Gastrointestinal: Negative for abdominal pain, nausea and vomiting.        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   • Mixed hyperlipidemia   • Type 2 diabetes mellitus (HCC)     Past Surgical History:   • AORTIC VALVE REPAIR/REPLACEMENT   • APPENDECTOMY   • HYSTERECTOMY   • INGUINAL HERNIA REPAIR   • LAMINECTOMY      Family History   Problem Relation Age of Onset   • Heart attack Father      Social History     Tobacco Use   • Smoking status: Never Smoker   • Smokeless tobacco: Never Used   Substance Use Topics   • Alcohol use: Never       Health Maintenance Due   Topic Date Due   • TDAP/TD VACCINES (1 - Tdap) Never done   • ZOSTER VACCINE (1 of 2) Never done    • URINE MICROALBUMIN  05/28/2021   • HEMOGLOBIN A1C  12/21/2021        Immunization History   Administered Date(s) Administered   • COVID-19 (MODERNA) 1st, 2nd, 3rd Dose Only 02/12/2021, 03/12/2021   • COVID-19 (PFIZER) PURPLE CAP 12/03/2021   • Fluzone High Dose =>65 Years ([employer] ONLY) 09/21/2017   • Fluzone High-Dose 65+yrs 09/29/2021   • Influenza Quad Vaccine (Inpatient) 10/08/2013   • Pneumococcal Polysaccharide (PPSV23) 09/21/2017       Allergies   Allergen Reactions   • Medrol [Methylprednisolone] Rash   • Niacin Rash   • Sulfamethoxazole-Trimethoprim Rash        Medications:  Current Outpatient Medications on File Prior to Visit   Medication Sig   • ascorbic acid (VITAMIN C) 250 MG tablet Take 250 mg by mouth Daily.   • atorvastatin (LIPITOR) 40 MG tablet Take 40 mg by mouth Daily.   • cephalexin (KEFLEX) 250 MG capsule Take 250 mg by mouth Daily.   • cholecalciferol (Cholecalciferol) 25 MCG (1000 UT) tablet Take 1,000 Units by mouth Daily.   • metoprolol tartrate (LOPRESSOR) 25 MG tablet Take 25 mg by mouth 2 (Two) Times a Day.   • Omega-3 Fatty Acids (fish oil) 1000 MG capsule capsule Take 1 capsule by mouth Daily.   • potassium chloride 10 MEQ CR tablet Take 10 mEq by mouth Daily.   • rosuvastatin (CRESTOR) 5 MG tablet TAKE 1 TABLET EVERY DAY   • torsemide (DEMADEX) 20 MG tablet Take 20 mg by mouth Daily.   • vitamin E 1000 UNIT capsule Take 1,000 Units by mouth Daily.   • [DISCONTINUED] levothyroxine (SYNTHROID, LEVOTHROID) 100 MCG tablet TAKE 1 TABLET EVERY DAY   • [DISCONTINUED] lisinopril (PRINIVIL,ZESTRIL) 20 MG tablet Take 1 tablet by mouth Daily.   • [DISCONTINUED] metFORMIN ER (GLUCOPHAGE-XR) 500 MG 24 hr tablet Take 2 tablets by mouth Daily With Dinner.   • [DISCONTINUED] pantoprazole (PROTONIX) 40 MG EC tablet TAKE 1 TABLET EVERY DAY   • [DISCONTINUED] potassium chloride (K-DUR,KLOR-CON) 20 MEQ CR tablet Take 20 mEq by mouth 2 (Two) Times a Day.     No current facility-administered  "medications on file prior to visit.       Vital Signs:   /52 (BP Location: Right arm, Patient Position: Sitting)   Pulse 66   Temp 97.5 °F (36.4 °C) (Oral)   Ht 167.6 cm (65.98\")   Wt 73.2 kg (161 lb 6.4 oz)   BMI 26.06 kg/m²       Physical Exam:  Physical Exam  Vitals reviewed.   Constitutional:       General: She is not in acute distress.     Appearance: She is not ill-appearing.   Eyes:      Pupils: Pupils are equal, round, and reactive to light.   Neck:      Comments: No thyromegaly  Cardiovascular:      Rate and Rhythm: Normal rate and regular rhythm.      Pulses:           Dorsalis pedis pulses are 2+ on the right side and 2+ on the left side.      Heart sounds: Murmur heard.       Pulmonary:      Effort: Pulmonary effort is normal.      Breath sounds: Normal breath sounds.   Abdominal:      General: There is no distension.      Palpations: Abdomen is soft.      Tenderness: There is no abdominal tenderness.   Musculoskeletal:      Cervical back: Neck supple.      Comments: There is an area of tenderness in the right mid chest near the midclavicular line.  Is actually just medial to that.  This is about the level of T2 or T3.   Feet:      Right foot:      Protective Sensation: 3 sites tested. 3 sites sensed.      Skin integrity: Skin integrity normal.      Left foot:      Protective Sensation: 3 sites tested. 3 sites sensed.      Skin integrity: Skin integrity normal.   Lymphadenopathy:      Cervical: No cervical adenopathy.   Skin:     Findings: No lesion or rash.   Neurological:      Mental Status: She is alert.         Result Review      The following data was reviewed by Jewel Chavez MD on 01/24/2022.  Lab Results   Component Value Date    WBC 5.94 03/22/2021    HGB 13.10 03/22/2021    HCT 39.8 03/22/2021    MCV 85.8 03/22/2021    .00 03/22/2021     Lab Results   Component Value Date    GLUCOSE 145 (H) 03/10/2021    BUN 15 03/10/2021    CREATININE 0.64 03/10/2021     " 03/10/2021    K 4.4 03/10/2021     03/10/2021    CO2 26 03/10/2021    CALCIUM 9.2 03/10/2021    PROTEINTOT 6.6 03/10/2021    ALBUMIN 3.9 03/10/2021    ALT 13 03/10/2021    AST 18 03/10/2021    ALKPHOS 98 03/10/2021    BILITOT 0.42 03/10/2021    GLOB 2.7 03/10/2021    BCR 23 (H) 03/10/2021    ANIONGAP 12 03/10/2021      Lab Results   Component Value Date    CHLPL 119 03/10/2021    TRIG 119 03/10/2021    HDL 39 (L) 03/10/2021    LDL 56 (L) 03/10/2021     Lab Results   Component Value Date    TSH 0.353 03/10/2021     Lab Results   Component Value Date    HGBA1C 6.7 06/21/2021            Assessment and Plan:   Today, we have reviewed her care.  Her usual problems seem relatively stable, but she is concerned about this area tenderness and pain in the right anterior chest.  We will move ahead with blood work and labs otherwise as noted.  Chest x-ray to be obtained today.  We will see what this testing shows and move forward from there.  I would consider moving ahead with a CT scan of the chest as a precaution depending.  Her blood pressure will be repeated before she leaves.       Diagnoses and all orders for this visit:    1. Type 2 diabetes mellitus without complication, without long-term current use of insulin (HCC) (Primary)  -     MicroAlbumin, Urine, Random - Urine, Clean Catch; Future  -     Hemoglobin A1c; Future  -     metFORMIN ER (GLUCOPHAGE-XR) 500 MG 24 hr tablet; Take 2 tablets by mouth Daily With Dinner.  Dispense: 180 tablet; Refill: 0    2. Mixed hyperlipidemia  -     Lipid Panel; Future  -     Comprehensive Metabolic Panel; Future    3. Essential hypertension  -     Comprehensive Metabolic Panel; Future  -     lisinopril (PRINIVIL,ZESTRIL) 20 MG tablet; Take 1 tablet by mouth Daily.  Dispense: 90 tablet; Refill: 1    4. Acquired hypothyroidism  -     TSH+Free T4; Future  -     levothyroxine (SYNTHROID, LEVOTHROID) 100 MCG tablet; Take 1 tablet by mouth Daily.  Dispense: 90 tablet; Refill: 1    5.  Chest wall pain  -     XR Chest 2 View; Future  -     CBC (No Diff); Future    Other orders  -     pantoprazole (PROTONIX) 40 MG EC tablet; Take 1 tablet by mouth Daily.  Dispense: 90 tablet; Refill: 1          Follow Up   Return in about 6 months (around 7/24/2022).  Patient was given instructions and counseling regarding her condition or for health maintenance advice. Please see specific information pulled into the AVS if appropriate.

## 2022-01-25 DIAGNOSIS — E03.9 ACQUIRED HYPOTHYROIDISM: ICD-10-CM

## 2022-01-25 DIAGNOSIS — R07.89 CHEST WALL PAIN: Primary | ICD-10-CM

## 2022-01-25 DIAGNOSIS — E11.9 TYPE 2 DIABETES MELLITUS WITHOUT COMPLICATION, WITHOUT LONG-TERM CURRENT USE OF INSULIN: ICD-10-CM

## 2022-01-25 DIAGNOSIS — D64.9 ANEMIA, UNSPECIFIED TYPE: ICD-10-CM

## 2022-01-25 RX ORDER — LEVOTHYROXINE SODIUM 88 UG/1
88 TABLET ORAL DAILY
Qty: 90 TABLET | Refills: 1 | Status: SHIPPED | OUTPATIENT
Start: 2022-01-25 | End: 2022-07-28

## 2022-02-03 ENCOUNTER — APPOINTMENT (OUTPATIENT)
Dept: CT IMAGING | Facility: HOSPITAL | Age: 82
End: 2022-02-03

## 2022-02-07 ENCOUNTER — TELEPHONE (OUTPATIENT)
Dept: FAMILY MEDICINE CLINIC | Age: 82
End: 2022-02-07

## 2022-02-07 DIAGNOSIS — R07.89 CHEST WALL PAIN: Primary | ICD-10-CM

## 2022-02-07 NOTE — TELEPHONE ENCOUNTER
Please let Zoila Dixon know that her insurance is not wanting to cover the CT that I ordered.  See if she is still having the pain in the chest wall.  If the pain is still present, then we could refer to pulmonology for second opinion.  If she would like to do that, please make it so.  I have canceled the order for the CT.  Thanks.

## 2022-02-08 ENCOUNTER — APPOINTMENT (OUTPATIENT)
Dept: CT IMAGING | Facility: HOSPITAL | Age: 82
End: 2022-02-08

## 2022-02-16 ENCOUNTER — TELEPHONE (OUTPATIENT)
Dept: FAMILY MEDICINE CLINIC | Age: 82
End: 2022-02-16

## 2022-02-16 NOTE — TELEPHONE ENCOUNTER
Caller: Zoila Whipple    Relationship: Self    Best call back number: [phone]    What is the best time to reach you: ANY     Who are you requesting to speak with (clinical staff, provider,  specific staff member): CLINICAL         What was the call regarding: PATIENT STATES THAT PCP DISCUSSED GETTING HER AN APPOINTMENT WITH A LUNG DOCTOR LAST WEEK AND SHE HAS NOT HEARD ANYTHING. SHE WAS JUST CALLING IN TO CHECK THE STATUS OF THAT VISIT.     Do you require a callback: YES

## 2022-02-25 ENCOUNTER — TELEPHONE (OUTPATIENT)
Dept: FAMILY MEDICINE CLINIC | Age: 82
End: 2022-02-25

## 2022-02-25 NOTE — TELEPHONE ENCOUNTER
Noted.  I would recommend she continue her current care.  We have labs scheduled in late April and we will recheck the sugar at that time.  Let me know if she has other concerns.  Thanks.

## 2022-02-25 NOTE — TELEPHONE ENCOUNTER
----- Message from Whitney Obregon LPN sent at 1/25/2022  9:13 AM EST -----  TICKLE to call her in about a month to see how her fasting sugars are running.

## 2022-03-21 ENCOUNTER — OFFICE VISIT (OUTPATIENT)
Dept: FAMILY MEDICINE CLINIC | Age: 82
End: 2022-03-21

## 2022-03-21 VITALS
SYSTOLIC BLOOD PRESSURE: 127 MMHG | BODY MASS INDEX: 25.88 KG/M2 | HEIGHT: 66 IN | WEIGHT: 161 LBS | TEMPERATURE: 97.5 F | DIASTOLIC BLOOD PRESSURE: 50 MMHG | HEART RATE: 73 BPM | OXYGEN SATURATION: 98 %

## 2022-03-21 DIAGNOSIS — J06.9 UPPER RESPIRATORY TRACT INFECTION, UNSPECIFIED TYPE: Primary | ICD-10-CM

## 2022-03-21 DIAGNOSIS — R05.9 COUGH: ICD-10-CM

## 2022-03-21 PROCEDURE — 96372 THER/PROPH/DIAG INJ SC/IM: CPT | Performed by: NURSE PRACTITIONER

## 2022-03-21 PROCEDURE — 99213 OFFICE O/P EST LOW 20 MIN: CPT | Performed by: NURSE PRACTITIONER

## 2022-03-21 RX ORDER — CEFTRIAXONE 1 G/1
1 INJECTION, POWDER, FOR SOLUTION INTRAMUSCULAR; INTRAVENOUS ONCE
Status: COMPLETED | OUTPATIENT
Start: 2022-03-21 | End: 2022-03-21

## 2022-03-21 RX ORDER — DOXYCYCLINE HYCLATE 100 MG/1
100 CAPSULE ORAL 2 TIMES DAILY
Qty: 14 CAPSULE | Refills: 0 | Status: SHIPPED | OUTPATIENT
Start: 2022-03-21 | End: 2022-03-21

## 2022-03-21 RX ORDER — DEXTROMETHORPHAN HYDROBROMIDE AND PROMETHAZINE HYDROCHLORIDE 15; 6.25 MG/5ML; MG/5ML
5 SYRUP ORAL 4 TIMES DAILY PRN
Qty: 118 ML | Refills: 0 | Status: SHIPPED | OUTPATIENT
Start: 2022-03-21 | End: 2022-04-04

## 2022-03-21 RX ADMIN — CEFTRIAXONE 1 G: 1 INJECTION, POWDER, FOR SOLUTION INTRAMUSCULAR; INTRAVENOUS at 15:47

## 2022-03-21 NOTE — PROGRESS NOTES
"Chief Complaint  Cough (Cough, congestion X 5 days (Neg COVID test))    Subjective    Patient is an 81 year old female in today with complaints of productive cough for five days. She reports this is a reoccurring issue for her when the weather changes. She reports a Z-pack usually does not work for her. She states she used to have a doctor who would give her a antibiotic shot and that would knock it out.           Zoila Whipple presents to [facility]  Cough  This is a new problem. The current episode started in the past 7 days. The cough is productive of sputum. Associated symptoms include chills, ear pain, headaches, nasal congestion, postnasal drip and shortness of breath. Pertinent negatives include no sore throat. The symptoms are aggravated by lying down. She has tried nothing for the symptoms. The treatment provided no relief.       Objective   Vital Signs:   /50 (BP Location: Left arm, Patient Position: Sitting)   Pulse 73   Temp 97.5 °F (36.4 °C) (Oral)   Ht 167.6 cm (65.98\")   Wt 73 kg (161 lb)   SpO2 98%   BMI 26.00 kg/m²     Physical Exam  HENT:      Head: Normocephalic.      Right Ear: A middle ear effusion is present.      Left Ear: A middle ear effusion is present.      Nose: Congestion present.      Right Sinus: Maxillary sinus tenderness and frontal sinus tenderness present.      Left Sinus: Maxillary sinus tenderness and frontal sinus tenderness present.   Eyes:      General: Allergic shiner present.   Cardiovascular:      Rate and Rhythm: Normal rate and regular rhythm.   Pulmonary:      Effort: Pulmonary effort is normal. No respiratory distress.      Breath sounds: Normal breath sounds. No stridor. No wheezing, rhonchi or rales.   Chest:      Chest wall: No tenderness.   Skin:     General: Skin is warm and dry.   Neurological:      Mental Status: She is alert and oriented to person, place, and time.        Result Review :                 Assessment " and Plan    Diagnoses and all orders for this visit:    1. Upper respiratory tract infection, unspecified type (Primary)  Comments:  Reoccuring, may be due to high pollen count, recommend OTC allergy medication during season change, follow up in two weeks if no improvement  Orders:  -     Discontinue: doxycycline (VIBRAMYCIN) 100 MG capsule; Take 1 capsule by mouth 2 (Two) Times a Day for 7 days.  Dispense: 14 capsule; Refill: 0  -     cefTRIAXone (ROCEPHIN) injection 1 g    2. Cough  -     promethazine-dextromethorphan (PROMETHAZINE-DM) 6.25-15 MG/5ML syrup; Take 5 mL by mouth 4 (Four) Times a Day As Needed for Cough.  Dispense: 118 mL; Refill: 0        Follow Up   Return in about 2 weeks (around 4/4/2022), or if symptoms worsen or fail to improve.  Patient was given instructions and counseling regarding her condition or for health maintenance advice. Please see specific information pulled into the AVS if appropriate.

## 2022-03-22 RX ORDER — ROSUVASTATIN CALCIUM 5 MG/1
5 TABLET, COATED ORAL DAILY
Qty: 90 TABLET | Refills: 0 | Status: SHIPPED | OUTPATIENT
Start: 2022-03-22 | End: 2022-06-21

## 2022-03-28 ENCOUNTER — TELEPHONE (OUTPATIENT)
Dept: FAMILY MEDICINE CLINIC | Age: 82
End: 2022-03-28

## 2022-03-28 NOTE — TELEPHONE ENCOUNTER
Pt is no better, coughing all the time, she did end up picking up the abx has 3 left, but did not get the cough med said you told her to cancel that, please advise

## 2022-03-28 NOTE — TELEPHONE ENCOUNTER
Caller: Zoila Whipple    Relationship: Self    Best call back number: [phone]    What is the best time to reach you: ANY    Who are you requesting to speak with (clinical staff, provider,  specific staff member): CLINICAL STAFF    What was the call regarding: PATIENT CALLED STATING THAT SHE WAS SEEN LAST WEEK AND HAS NOT GOTTEN ANY BETTER. SHE WOULD LIKE TO SPEAK TO A NURSE ABOUT WHAT SHE SHOULD DO OR IF THERE IS ANY WAY FOR HER TO SEE DR BECKER.    Do you require a callback: YES

## 2022-03-29 ENCOUNTER — OFFICE VISIT (OUTPATIENT)
Dept: FAMILY MEDICINE CLINIC | Age: 82
End: 2022-03-29

## 2022-03-29 ENCOUNTER — HOSPITAL ENCOUNTER (OUTPATIENT)
Dept: GENERAL RADIOLOGY | Facility: HOSPITAL | Age: 82
Discharge: HOME | End: 2022-03-29

## 2022-03-29 ENCOUNTER — LAB (OUTPATIENT)
Dept: LAB | Facility: HOSPITAL | Age: 82
End: 2022-03-29

## 2022-03-29 VITALS
WEIGHT: 157.8 LBS | HEART RATE: 74 BPM | DIASTOLIC BLOOD PRESSURE: 45 MMHG | TEMPERATURE: 97.6 F | OXYGEN SATURATION: 98 % | SYSTOLIC BLOOD PRESSURE: 142 MMHG | HEIGHT: 66 IN | BODY MASS INDEX: 25.36 KG/M2

## 2022-03-29 DIAGNOSIS — J18.9 PNEUMONIA OF LEFT LOWER LOBE DUE TO INFECTIOUS ORGANISM: Primary | ICD-10-CM

## 2022-03-29 DIAGNOSIS — J18.9 PNEUMONIA OF LEFT LOWER LOBE DUE TO INFECTIOUS ORGANISM: ICD-10-CM

## 2022-03-29 PROCEDURE — 99214 OFFICE O/P EST MOD 30 MIN: CPT | Performed by: FAMILY MEDICINE

## 2022-03-29 PROCEDURE — 71046 X-RAY EXAM CHEST 2 VIEWS: CPT

## 2022-03-29 PROCEDURE — U0004 COV-19 TEST NON-CDC HGH THRU: HCPCS | Performed by: FAMILY MEDICINE

## 2022-03-29 PROCEDURE — C9803 HOPD COVID-19 SPEC COLLECT: HCPCS

## 2022-03-29 RX ORDER — LEVOFLOXACIN 750 MG/1
750 TABLET ORAL DAILY
Qty: 7 TABLET | Refills: 0 | Status: SHIPPED | OUTPATIENT
Start: 2022-03-29 | End: 2022-08-04

## 2022-03-29 NOTE — PROGRESS NOTES
"Zoila Whipple presents to [facility].    Chief Complaint:  \"I am sick as a dog\", cough and congestion    Subjective       History of Present Illness:  Zoila Dixon is in today for follow-up on cough and congestion.  She has been struggling with this for the last 2 weeks.  She was actually seen here roughly 1 week ago and was given a Rocephin injection along with a prescription for doxycycline.  She says that she is not feeling better.  She is continuing to cough.  The cough is productive of \"old yellow stuff\".  She is unsure if she is running a fever.  The picture is somewhat complicated because Zoila also took a different antibiotic along with the doxycycline for a urine infection.  That was prescribed from her urologist.      Review of Systems:  Review of Systems   Constitutional: Negative for chills and fever.   Respiratory: Positive for cough and shortness of breath.    Cardiovascular: Negative for chest pain and palpitations.   Gastrointestinal: Positive for abdominal pain. Negative for nausea and vomiting.        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   • Mixed hyperlipidemia   • Type 2 diabetes mellitus (HCC)     Past Surgical History:   • AORTIC VALVE REPAIR/REPLACEMENT   • APPENDECTOMY   • HYSTERECTOMY   • INGUINAL HERNIA REPAIR   • LAMINECTOMY      Family History   Problem Relation Age of Onset   • Heart attack Father      Social History     Tobacco Use   • Smoking status: Never Smoker   • Smokeless tobacco: Never Used   Substance Use Topics   • Alcohol use: Never       Health Maintenance Due   Topic Date Due   • TDAP/TD VACCINES (1 - Tdap) Never done   • ZOSTER VACCINE (1 of 2) Never done        Immunization History   Administered Date(s) Administered   • COVID-19 (MODERNA) 1st, 2nd, 3rd Dose Only 02/12/2021, 03/12/2021   • COVID-19 " "(PFIZER) PURPLE CAP 12/03/2021   • Fluzone High Dose =>65 Years ([employer] ONLY) 09/21/2017   • Fluzone High-Dose 65+yrs 09/29/2021   • Influenza Quad Vaccine (Inpatient) 10/08/2013   • Pneumococcal Polysaccharide (PPSV23) 09/21/2017       Allergies   Allergen Reactions   • Medrol [Methylprednisolone] Rash   • Niacin Rash   • Sulfamethoxazole-Trimethoprim Rash        Medications:  Current Outpatient Medications on File Prior to Visit   Medication Sig   • ascorbic acid (VITAMIN C) 250 MG tablet Take 250 mg by mouth Daily.   • cholecalciferol (VITAMIN D3) 25 MCG (1000 UT) tablet Take 1,000 Units by mouth Daily.   • levothyroxine (SYNTHROID, LEVOTHROID) 88 MCG tablet Take 1 tablet by mouth Daily.   • lisinopril (PRINIVIL,ZESTRIL) 20 MG tablet Take 1 tablet by mouth Daily.   • metFORMIN ER (GLUCOPHAGE-XR) 500 MG 24 hr tablet Take 2 tablets by mouth Daily With Dinner.   • metoprolol tartrate (LOPRESSOR) 25 MG tablet Take 25 mg by mouth 2 (Two) Times a Day.   • Omega-3 Fatty Acids (fish oil) 1000 MG capsule capsule Take 1 capsule by mouth Daily.   • pantoprazole (PROTONIX) 40 MG EC tablet Take 1 tablet by mouth Daily.   • potassium chloride 10 MEQ CR tablet Take 10 mEq by mouth Daily.   • promethazine-dextromethorphan (PROMETHAZINE-DM) 6.25-15 MG/5ML syrup Take 5 mL by mouth 4 (Four) Times a Day As Needed for Cough.   • rosuvastatin (CRESTOR) 5 MG tablet Take 1 tablet by mouth Daily.   • torsemide (DEMADEX) 20 MG tablet Take 20 mg by mouth Daily.   • vitamin E 1000 UNIT capsule Take 1,000 Units by mouth Daily.   • [DISCONTINUED] cephalexin (KEFLEX) 250 MG capsule Take 250 mg by mouth Daily.     No current facility-administered medications on file prior to visit.       Vital Signs:   /45 (BP Location: Right arm, Patient Position: Sitting)   Pulse 74   Temp 97.6 °F (36.4 °C) (Oral)   Ht 167.6 cm (65.98\")   Wt 71.6 kg (157 lb 12.8 oz)   SpO2 98% Comment: RA  BMI 25.48 kg/m²       Physical Exam:  Physical " Exam  Vitals and nursing note reviewed.   Constitutional:       General: She is not in acute distress.     Appearance: She is not ill-appearing.   HENT:      Right Ear: Tympanic membrane and ear canal normal.      Left Ear: Tympanic membrane and ear canal normal.      Mouth/Throat:      Mouth: Mucous membranes are moist.      Comments: Pharynx appears normal  Eyes:      Extraocular Movements: Extraocular movements intact.      Pupils: Pupils are equal, round, and reactive to light.   Neck:      Thyroid: No thyromegaly.   Cardiovascular:      Rate and Rhythm: Normal rate and regular rhythm.      Heart sounds: No murmur heard.  Pulmonary:      Effort: Pulmonary effort is normal.      Breath sounds: Rales (left lower lobe) present.   Abdominal:      General: There is no distension.      Palpations: Abdomen is soft. There is no mass.      Tenderness: There is no abdominal tenderness.   Musculoskeletal:      Cervical back: Normal range of motion.   Skin:     Findings: No lesion or rash.   Neurological:      General: No focal deficit present.      Mental Status: She is oriented to person, place, and time.      Cranial Nerves: No cranial nerve deficit.   Psychiatric:         Mood and Affect: Mood normal.         Result Review      The following data was reviewed by Jewel Chavez MD on 03/29/2022.  Lab Results   Component Value Date    WBC 6.31 01/24/2022    HGB 11.9 (L) 01/24/2022    HCT 36.7 01/24/2022    MCV 84.4 01/24/2022     01/24/2022     Lab Results   Component Value Date    GLUCOSE 112 (H) 01/24/2022    BUN 20 01/24/2022    CREATININE 0.88 01/24/2022     01/24/2022    K 4.4 01/24/2022     01/24/2022    CO2 25.9 01/24/2022    CALCIUM 9.2 01/24/2022    PROTEINTOT 6.8 01/24/2022    ALBUMIN 3.80 01/24/2022    ALT 14 01/24/2022    AST 17 01/24/2022    ALKPHOS 118 (H) 01/24/2022    BILITOT 0.3 01/24/2022    EGFRIFNONA 61 01/24/2022    GLOB 3.0 01/24/2022    AGRATIO 1.3 01/24/2022    BCR 22.7  01/24/2022    ANIONGAP 10.1 01/24/2022      Lab Results   Component Value Date    CHOL 106 01/24/2022    CHLPL 119 03/10/2021    TRIG 272 (H) 01/24/2022    HDL 31 (L) 01/24/2022    LDL 34 01/24/2022     Lab Results   Component Value Date    TSH 0.036 (L) 01/24/2022     Lab Results   Component Value Date    HGBA1C 7.61 (H) 01/24/2022            Assessment and Plan:   Today, we have again reviewed her care.  She may very well have pneumonia.  She has crackles in the left lower lobe and ongoing cough.  We will cover her for this as noted below with oral Levaquin for a week.  Additionally, we will swab her for COVID-19 as a precaution.  Chest x-ray and sputum culture will be obtained if possible.  I have given her precautions for the rest of this week.  If she were to have worsening shortness of breath or fever after starting the antibiotic, then I would recommend going to the emergency department as a precaution.  She is already been on a couple of different antibiotics in the last week, and she has not seen improvement.  We will be back in touch with her tomorrow regarding the results of the testing.       Diagnoses and all orders for this visit:    1. Pneumonia of left lower lobe due to infectious organism (Primary)  -     XR Chest 2 View; Future  -     levoFLOXacin (LEVAQUIN) 750 MG tablet; Take 1 tablet by mouth Daily.  Dispense: 7 tablet; Refill: 0  -     COVID-19,APTIMA PANTHER(ST[facility]),[facility], NP/OP SWAB IN UTM/VTM/[facility]SP[facility] MEDIA,24 HR TAT - Swab, Nasopharynx  -     Respiratory Culture - Sputum, Cough; Future          Follow Up   Return if symptoms worsen or fail to improve.  Patient was given instructions and counseling regarding her condition or for health maintenance advice. Please see specific information pulled into the AVS if appropriate.

## 2022-03-30 LAB — SARS-COV-2 RNA PNL SPEC NAA+PROBE: NOT DETECTED

## 2022-04-04 ENCOUNTER — OFFICE VISIT (OUTPATIENT)
Dept: FAMILY MEDICINE CLINIC | Age: 82
End: 2022-04-04

## 2022-04-04 VITALS
BODY MASS INDEX: 25.39 KG/M2 | HEIGHT: 66 IN | WEIGHT: 158 LBS | SYSTOLIC BLOOD PRESSURE: 124 MMHG | DIASTOLIC BLOOD PRESSURE: 51 MMHG | TEMPERATURE: 97.5 F | HEART RATE: 72 BPM

## 2022-04-04 DIAGNOSIS — J18.9 PNEUMONIA OF LEFT LOWER LOBE DUE TO INFECTIOUS ORGANISM: Primary | ICD-10-CM

## 2022-04-04 PROCEDURE — 99213 OFFICE O/P EST LOW 20 MIN: CPT | Performed by: FAMILY MEDICINE

## 2022-04-04 NOTE — PROGRESS NOTES
Zoila Whipple presents to [facility].    Chief Complaint:  Presumed pneumonia    Subjective       History of Present Illness:  Zoila Dixon is back today for follow up.  Please see her last visit.  We diagnosed her with a presumed left lower lobe pneumonia last week at her visit.  She felt pretty bad at that time.  She did have crackles in the left lung base but chest x-ray was normal.  She says she feels 100% better as compared to last week.  She denies any fever or chills at this time.  She denies any ongoing cough presently.      Review of Systems:  Review of Systems   Constitutional: Negative for chills and fever.   Respiratory: Negative for cough and shortness of breath.    Cardiovascular: Negative for chest pain and palpitations.   Gastrointestinal: Negative for abdominal pain, nausea and vomiting.        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   • Mixed hyperlipidemia   • Type 2 diabetes mellitus (HCC)     Past Surgical History:   • AORTIC VALVE REPAIR/REPLACEMENT   • APPENDECTOMY   • HYSTERECTOMY   • INGUINAL HERNIA REPAIR   • LAMINECTOMY      Family History   Problem Relation Age of Onset   • Heart attack Father      Social History     Tobacco Use   • Smoking status: Never Smoker   • Smokeless tobacco: Never Used   Substance Use Topics   • Alcohol use: Never       Health Maintenance Due   Topic Date Due   • TDAP/TD VACCINES (1 - Tdap) Never done   • ZOSTER VACCINE (1 of 2) Never done        Immunization History   Administered Date(s) Administered   • COVID-19 (MODERNA) 1st, 2nd, 3rd Dose Only 02/12/2021, 03/12/2021   • COVID-19 (PFIZER) PURPLE CAP 12/03/2021   • Fluzone High Dose =>65 Years (Vaxcare ONLY) 09/21/2017   • Fluzone High-Dose 65+yrs 09/29/2021   • Influenza Quad Vaccine (Inpatient) 10/08/2013   • Pneumococcal Polysaccharide  "(PPSV23) 09/21/2017       Allergies   Allergen Reactions   • Medrol [Methylprednisolone] Rash   • Niacin Rash   • Sulfamethoxazole-Trimethoprim Rash        Medications:  Current Outpatient Medications on File Prior to Visit   Medication Sig   • ascorbic acid (VITAMIN C) 250 MG tablet Take 250 mg by mouth Daily.   • cholecalciferol (VITAMIN D3) 25 MCG (1000 UT) tablet Take 1,000 Units by mouth Daily.   • levoFLOXacin (LEVAQUIN) 750 MG tablet Take 1 tablet by mouth Daily.   • levothyroxine (SYNTHROID, LEVOTHROID) 88 MCG tablet Take 1 tablet by mouth Daily.   • lisinopril (PRINIVIL,ZESTRIL) 20 MG tablet Take 1 tablet by mouth Daily.   • metFORMIN ER (GLUCOPHAGE-XR) 500 MG 24 hr tablet Take 2 tablets by mouth Daily With Dinner.   • metoprolol tartrate (LOPRESSOR) 25 MG tablet Take 25 mg by mouth 2 (Two) Times a Day.   • Omega-3 Fatty Acids (fish oil) 1000 MG capsule capsule Take 1 capsule by mouth Daily.   • pantoprazole (PROTONIX) 40 MG EC tablet Take 1 tablet by mouth Daily.   • potassium chloride 10 MEQ CR tablet Take 10 mEq by mouth Daily.   • promethazine-dextromethorphan (PROMETHAZINE-DM) 6.25-15 MG/5ML syrup Take 5 mL by mouth 4 (Four) Times a Day As Needed for Cough.   • rosuvastatin (CRESTOR) 5 MG tablet Take 1 tablet by mouth Daily.   • torsemide (DEMADEX) 20 MG tablet Take 20 mg by mouth Daily.   • vitamin E 1000 UNIT capsule Take 1,000 Units by mouth Daily.     No current facility-administered medications on file prior to visit.       Vital Signs:   /51 (BP Location: Right arm, Patient Position: Sitting)   Pulse 72   Temp 97.5 °F (36.4 °C) (Oral)   Ht 167.6 cm (65.98\")   Wt 71.7 kg (158 lb)   BMI 25.51 kg/m²       Physical Exam:  Physical Exam  Vitals reviewed.   Constitutional:       General: She is not in acute distress.     Appearance: She is not ill-appearing.   Eyes:      Pupils: Pupils are equal, round, and reactive to light.   Neck:      Comments: No thyromegaly  Cardiovascular:      Rate " and Rhythm: Normal rate and regular rhythm.   Pulmonary:      Effort: Pulmonary effort is normal.      Breath sounds: Rales (mild crackles at bases but improved) present.   Abdominal:      General: There is no distension.      Palpations: Abdomen is soft.      Tenderness: There is no abdominal tenderness.   Musculoskeletal:      Cervical back: Neck supple.   Lymphadenopathy:      Cervical: No cervical adenopathy.   Skin:     Findings: No lesion or rash.   Neurological:      Mental Status: She is alert.         Result Review      The following data was reviewed by Jewel Chavez MD on 04/04/2022.  Lab Results   Component Value Date    WBC 6.31 01/24/2022    HGB 11.9 (L) 01/24/2022    HCT 36.7 01/24/2022    MCV 84.4 01/24/2022     01/24/2022     Lab Results   Component Value Date    GLUCOSE 112 (H) 01/24/2022    BUN 20 01/24/2022    CREATININE 0.88 01/24/2022     01/24/2022    K 4.4 01/24/2022     01/24/2022    CO2 25.9 01/24/2022    CALCIUM 9.2 01/24/2022    PROTEINTOT 6.8 01/24/2022    ALBUMIN 3.80 01/24/2022    ALT 14 01/24/2022    AST 17 01/24/2022    ALKPHOS 118 (H) 01/24/2022    BILITOT 0.3 01/24/2022    EGFRIFNONA 61 01/24/2022    GLOB 3.0 01/24/2022    AGRATIO 1.3 01/24/2022    BCR 22.7 01/24/2022    ANIONGAP 10.1 01/24/2022      Lab Results   Component Value Date    CHOL 106 01/24/2022    CHLPL 119 03/10/2021    TRIG 272 (H) 01/24/2022    HDL 31 (L) 01/24/2022    LDL 34 01/24/2022     Lab Results   Component Value Date    TSH 0.036 (L) 01/24/2022     Lab Results   Component Value Date    HGBA1C 7.61 (H) 01/24/2022     COVID-19,APTIMA PANTHER(STU),[facility], NP/OP SWAB IN UTM/VTM/[facility] TRANSPORT MEDIA,24 HR TAT - Swab, Nasopharynx (03/29/2022 14:54)  XR Chest 2 View (03/29/2022 15:09)           Assessment and Plan:   Zoila Dixon sounds much better today.  She looks well also.  I have reviewed her testing.  There was no worrisome finding on x-ray, and the COVID-19 testing was  negative.  For now, I have offered reassurance.  I would expect continued improvement.  If something changes, she will reach back out to us.  She will follow up in about 3 weeks for additional blood work as already ordered.       Diagnoses and all orders for this visit:    1. Pneumonia of left lower lobe due to infectious organism (Primary)          Follow Up   Return in about 4 months (around 8/4/2022) for as scheduled.  Patient was given instructions and counseling regarding her condition or for health maintenance advice. Please see specific information pulled into the AVS if appropriate.

## 2022-04-25 ENCOUNTER — TELEPHONE (OUTPATIENT)
Dept: FAMILY MEDICINE CLINIC | Age: 82
End: 2022-04-25

## 2022-04-25 NOTE — TELEPHONE ENCOUNTER
----- Message from Whitney Obregon LPN sent at 1/25/2022  9:13 AM EST -----  TICKLE for repeat labs as ordered in 90 days

## 2022-04-28 ENCOUNTER — LAB (OUTPATIENT)
Dept: LAB | Facility: HOSPITAL | Age: 82
End: 2022-04-28

## 2022-04-28 DIAGNOSIS — D64.9 ANEMIA, UNSPECIFIED TYPE: ICD-10-CM

## 2022-04-28 DIAGNOSIS — E11.9 TYPE 2 DIABETES MELLITUS WITHOUT COMPLICATION, WITHOUT LONG-TERM CURRENT USE OF INSULIN: ICD-10-CM

## 2022-04-28 DIAGNOSIS — E03.9 ACQUIRED HYPOTHYROIDISM: ICD-10-CM

## 2022-04-28 LAB
DEPRECATED RDW RBC AUTO: 45.1 FL (ref 37–54)
ERYTHROCYTE [DISTWIDTH] IN BLOOD BY AUTOMATED COUNT: 14.3 % (ref 12.3–15.4)
HBA1C MFR BLD: 6.4 % (ref 4.8–5.6)
HCT VFR BLD AUTO: 39.5 % (ref 34–46.6)
HGB BLD-MCNC: 12.5 G/DL (ref 12–15.9)
MCH RBC QN AUTO: 27.1 PG (ref 26.6–33)
MCHC RBC AUTO-ENTMCNC: 31.6 G/DL (ref 31.5–35.7)
MCV RBC AUTO: 85.5 FL (ref 79–97)
PLATELET # BLD AUTO: 184 10*3/MM3 (ref 140–450)
PMV BLD AUTO: 11.5 FL (ref 6–12)
RBC # BLD AUTO: 4.62 10*6/MM3 (ref 3.77–5.28)
TSH SERPL DL<=0.05 MIU/L-ACNC: 0.31 UIU/ML (ref 0.27–4.2)
WBC NRBC COR # BLD: 5.69 10*3/MM3 (ref 3.4–10.8)

## 2022-04-28 PROCEDURE — 83036 HEMOGLOBIN GLYCOSYLATED A1C: CPT

## 2022-04-28 PROCEDURE — 84443 ASSAY THYROID STIM HORMONE: CPT

## 2022-04-28 PROCEDURE — 36415 COLL VENOUS BLD VENIPUNCTURE: CPT

## 2022-04-28 PROCEDURE — 85027 COMPLETE CBC AUTOMATED: CPT

## 2022-05-19 ENCOUNTER — LAB (OUTPATIENT)
Dept: LAB | Facility: HOSPITAL | Age: 82
End: 2022-05-19

## 2022-05-19 ENCOUNTER — OFFICE VISIT (OUTPATIENT)
Dept: PULMONOLOGY | Facility: CLINIC | Age: 82
End: 2022-05-19

## 2022-05-19 VITALS
DIASTOLIC BLOOD PRESSURE: 53 MMHG | RESPIRATION RATE: 19 BRPM | SYSTOLIC BLOOD PRESSURE: 118 MMHG | WEIGHT: 156 LBS | BODY MASS INDEX: 25.99 KG/M2 | HEIGHT: 65 IN | TEMPERATURE: 97.3 F | OXYGEN SATURATION: 95 % | HEART RATE: 63 BPM

## 2022-05-19 DIAGNOSIS — K21.9 GERD WITHOUT ESOPHAGITIS: ICD-10-CM

## 2022-05-19 DIAGNOSIS — E11.9 TYPE 2 DIABETES MELLITUS WITHOUT COMPLICATION, WITHOUT LONG-TERM CURRENT USE OF INSULIN: ICD-10-CM

## 2022-05-19 DIAGNOSIS — Z95.3 H/O AORTIC VALVE REPLACEMENT WITH PORCINE VALVE: ICD-10-CM

## 2022-05-19 DIAGNOSIS — I10 ESSENTIAL HYPERTENSION: ICD-10-CM

## 2022-05-19 DIAGNOSIS — R07.89 CHEST PAIN, ATYPICAL: Primary | ICD-10-CM

## 2022-05-19 DIAGNOSIS — R07.89 CHEST PAIN, ATYPICAL: ICD-10-CM

## 2022-05-19 DIAGNOSIS — J15.9 COMMUNITY ACQUIRED BACTERIAL PNEUMONIA: ICD-10-CM

## 2022-05-19 DIAGNOSIS — R76.8 ANA POSITIVE: ICD-10-CM

## 2022-05-19 LAB — D DIMER PPP FEU-MCNC: 0.35 MCGFEU/ML (ref 0–0.57)

## 2022-05-19 PROCEDURE — 86235 NUCLEAR ANTIGEN ANTIBODY: CPT

## 2022-05-19 PROCEDURE — 99204 OFFICE O/P NEW MOD 45 MIN: CPT | Performed by: INTERNAL MEDICINE

## 2022-05-19 PROCEDURE — 36415 COLL VENOUS BLD VENIPUNCTURE: CPT

## 2022-05-19 PROCEDURE — 86225 DNA ANTIBODY NATIVE: CPT

## 2022-05-19 PROCEDURE — 86038 ANTINUCLEAR ANTIBODIES: CPT

## 2022-05-19 PROCEDURE — 85379 FIBRIN DEGRADATION QUANT: CPT

## 2022-05-19 RX ORDER — ASPIRIN 81 MG/1
81 TABLET ORAL DAILY
COMMUNITY
End: 2023-03-17

## 2022-05-19 RX ORDER — CEPHALEXIN 250 MG/1
250 CAPSULE ORAL DAILY
COMMUNITY
Start: 2022-05-11

## 2022-05-19 NOTE — PROGRESS NOTES
"Primary Care Provider  Jewel Chavez MD   Referring Provider  Jewel Chavez, *      Patient Complaint  Establish Care (New Patient ) and Chest Wall Pain       Subjective          Zoila Whipple presents to [facility]      History of Presenting Illness  Zoila Whipple is a 81 y.o. female  (2019) with NIDDM, hypertension and a history of positive VIKTOR and the recent left lower lobe pneumonia referred for evaluation of cough and chest pain.    12/2021: Acute chest congestion with coughing followed by chest discomfort/pain  Chest pain persisted with subsequent cardiology evaluation    3/2022: Cardiology consultation    4/2022: Echo and stress test reported okay    3/29/2022: Primary care evaluation for cough and congestion tx with Levaquin 750 mg/day for 7 days for possible left lower lobe pneumonia    4/4/2022: Family practice follow-up and feeling better but due to persistent chest discomfort pulmonary consultation recommended    5/19/2022: Cough has completely resolved.  No productive phlegm.  Mild intermittent dyspnea walking 2 flights of stairs and intermittently at rest  No significant dyspnea walking flat but inactive  Chest discomfort persists.  This began 12/2021 usually 2-3 times per day lasting between 5 to 10 minutes either at rest or with exertion.  Tender to touch.  Symptoms improved with heat and Tylenol  Currently chest discomfort has resolved  Chest discomfort is pleuritic when it occurs.  Never hemoptysis.  Not positional.  Better with \"holding\" or splinting the chest  Aspiration with liquids and foods absent    5 children, 15 grandchildren and 13 great-grandchildren    PPD n/a   Skin tests n/a       I have personally reviewed the review of systems, past social, family, medical and surgical histories; and agree with their findings.        Review of Systems  Constitutional symptoms:   Weight decreased 10 pounds in the last 1 year; " "appetite normal  Ear, nose, throat: Denied complaints  Eyes: Impaired vision   cardiovascular:  Denied complaints  Respiratory: Dyspnea and chest pain  Gastrointestinal: no N&V, diarrhea, acid reflux   Musculoskeletal: Arthritic feet pain  Genitourinary: Chronic urinary tract infections with E. coli currently doing self catheterization for the last 8 years once per day currently  Allergy / Immunology: Denied complaints  Hematologic: Denied complaints  Neurologic: Denied complaints  Skin: Denied complaints  Endocrine: Denied complaints  Psychiatric: Denied complaints  Sleep: Mild snoring, no witnessed apneas, no significant daytime somnolence \"Augie rabbit energy\"      Family History   Problem Relation Age of Onset   • No Known Problems Mother         Mother  94 years old \"old age\"   • Heart attack Father         Father  49 years old MI        Social History     Socioeconomic History   • Marital status:    • Number of children: 5   Tobacco Use   • Smoking status: Never Smoker   • Smokeless tobacco: Never Used   Vaping Use   • Vaping Use: Never used   Substance and Sexual Activity   • Alcohol use: Never        Past Medical History:   Diagnosis Date   • Acquired hypothyroidism    • Essential hypertension    • GERD without esophagitis    • H/O aortic valve replacement with porcine valve    • Mixed hyperlipidemia    • Type 2 diabetes mellitus (HCC)         Immunization History   Administered Date(s) Administered   • COVID-19 (MODERNA) 1st, 2nd, 3rd Dose Only 2021, 2021   • COVID-19 (PFIZER) PURPLE CAP 2021   • Fluzone High Dose =>65 Years (Vaxcare ONLY) 2017   • Fluzone High-Dose 65+yrs 2021   • Influenza Quad Vaccine (Inpatient) 10/08/2013   • Pneumococcal Polysaccharide (PPSV23) 2017         Allergies   Allergen Reactions   • Medrol [Methylprednisolone] Rash   • Niacin Rash   • Sulfamethoxazole-Trimethoprim Rash          Current Outpatient Medications:   •  ascorbic " "acid (VITAMIN C) 250 MG tablet, Take 250 mg by mouth Daily., Disp: , Rfl:   •  aspirin 81 MG EC tablet, Take 81 mg by mouth Daily., Disp: , Rfl:   •  cephalexin (KEFLEX) 250 MG capsule, , Disp: , Rfl:   •  cholecalciferol (VITAMIN D3) 25 MCG (1000 UT) tablet, Take 1,000 Units by mouth Daily., Disp: , Rfl:   •  levothyroxine (SYNTHROID, LEVOTHROID) 88 MCG tablet, Take 1 tablet by mouth Daily., Disp: 90 tablet, Rfl: 1  •  lisinopril (PRINIVIL,ZESTRIL) 20 MG tablet, Take 1 tablet by mouth Daily., Disp: 90 tablet, Rfl: 1  •  metFORMIN ER (GLUCOPHAGE-XR) 500 MG 24 hr tablet, Take 2 tablets by mouth Daily With Dinner., Disp: 180 tablet, Rfl: 0  •  metoprolol tartrate (LOPRESSOR) 25 MG tablet, Take 25 mg by mouth 2 (Two) Times a Day., Disp: , Rfl:   •  Omega-3 Fatty Acids (fish oil) 1000 MG capsule capsule, Take 1 capsule by mouth Daily., Disp: , Rfl:   •  pantoprazole (PROTONIX) 40 MG EC tablet, Take 1 tablet by mouth Daily., Disp: 90 tablet, Rfl: 1  •  potassium chloride 10 MEQ CR tablet, Take 10 mEq by mouth Daily., Disp: , Rfl:   •  rosuvastatin (CRESTOR) 5 MG tablet, Take 1 tablet by mouth Daily., Disp: 90 tablet, Rfl: 0  •  torsemide (DEMADEX) 20 MG tablet, Take 20 mg by mouth Daily., Disp: , Rfl:   •  vitamin E 1000 UNIT capsule, Take 1,000 Units by mouth Daily., Disp: , Rfl:   •  levoFLOXacin (LEVAQUIN) 750 MG tablet, Take 1 tablet by mouth Daily., Disp: 7 tablet, Rfl: 0         Objective     Vital Signs:   /53 (BP Location: Left arm, Patient Position: Sitting, Cuff Size: Adult)   Pulse 63   Temp 97.3 °F (36.3 °C) (Temporal)   Resp 19   Ht 165.1 cm (65\")   Wt 70.8 kg (156 lb)   SpO2 95% Comment: room air  BMI 25.96 kg/m²     Physical Exam  Vital Signs Reviewed   WDWN, Alert, NAD.    HEENT:   No significant oropharyngeal crowding with mallampati index 1/4; PERRL, EOMI.  OP, nares clear, no sinus tenderness  Neck:  Supple, no JVD, no thyromegaly  Lymph: no axillary, cervical, supraclavicular " lymphadenopathy noted bilaterally  Chest:   Scant atelectatic rales posterior lateral bases, no pleural friction rubs, no work of breathing noted; no prolonged expiration; no hyperinflation; definite tenderness right anterior rib to palpation  CV: RRR, no MGR, pulses 2+, equal.  Abd:  Soft, NT, ND, + BS, no HSM  EXT:  no clubbing, no cyanosis, no edema, no joint tenderness  Neuro:  A&O x 3, CN grossly intact, no focal deficits.  Skin: No rashes or lesions noted       Result Review :     Lab 4/26/2019: Positive VIKTOR    Lab 1/24/2022: Creatinine 0.88; bicarbonate 25; TSH 0.036; hemoglobin A1c 6.40 on 4/28/2022 with hematocrit 39% and WBC 5690    COVID test reported - 3/2022    CXR 3/29/2022: T8 wedge compression fracture but NAD    Echo 4/2022: Okay    Cardiac stress test 4/2022: Okay    I have personally reviewed the above    No results found for: CBCDIF, AMBIGCMP, TSHBASE, BNP, DDIMER, AFPTM     No results found for: SITE, ALLENTEST, PHART, ACU0YYE, PO2ART, PUO3PUJ, BASEEXCESS, A2XAGTSJ, HGBBG, HCTABG, OXYHEMOGLOBI, METHHGBN, CARBOXYHGB, CO2CT, BAROMETRIC, MODALITY, FIO2                         CARDIAC LABS:         Pulmonary Functions Testing Results:    No results found for: FEV1, FVC, CNS6MJX, TLC, DLCO              Assessment and Plan      Patient Active Problem List   Diagnosis   • Type 2 diabetes mellitus (HCC)   • S/P aortic valve replacement with prosthetic valve   • Mixed hyperlipidemia   • GERD without esophagitis   • Acquired hypothyroidism   • Essential hypertension   • H/O aortic valve replacement with porcine valve           Impression:    1.  Chronic chest discomfort: Favor musculoskeletal source, possibly a hairline rib fracture from coughing after acute bronchitis 12/2021  (+) VIKTOR intriguing suggesting the possibility of pleural pericarditis from autoimmune disease but more likely a false positive  Doubt pulmonary emboli  Cardiac work-up reported negative  Symptoms do not suggest reflux    2.   Pneumonia left lower lobe 3/29/2022: Clinical diagnosis treated with Levaquin but not documented on radiograph    3. (+) VIKTOR 2019    4.  NIDDM 2: Good control with A1c 6.4 and morning sugars average 102    5.  Hypertension    6.  GERD    Plan:    CT chest to evaluate chest discomfort, basilar rales, etc.    Repeat VIKTOR and D-dimer    COVID-vaccine x3 done  Pneumovax done 2020    Heat or ice as needed  Tylenol as needed  If no improvement Motrin as needed    6 weeks    Medications personally reviewed.      Follow Up   Return in about 6 weeks (around 6/30/2022).  Patient was given instructions and counseling regarding her condition or for health maintenance advice. Please see specific information pulled into the AVS if appropriate.

## 2022-05-21 LAB
ANA SER QL: POSITIVE
CENTROMERE B AB SER-ACNC: <0.2 AI (ref 0–0.9)
CHROMATIN AB SERPL-ACNC: 0.5 AI (ref 0–0.9)
DSDNA AB SER-ACNC: 1 IU/ML (ref 0–9)
ENA JO1 AB SER-ACNC: <0.2 AI (ref 0–0.9)
ENA RNP AB SER-ACNC: 0.8 AI (ref 0–0.9)
ENA SCL70 AB SER-ACNC: <0.2 AI (ref 0–0.9)
ENA SM AB SER-ACNC: 0.2 AI (ref 0–0.9)
ENA SS-A AB SER-ACNC: 0.2 AI (ref 0–0.9)
ENA SS-B AB SER-ACNC: <0.2 AI (ref 0–0.9)
Lab: ABNORMAL

## 2022-05-27 ENCOUNTER — HOSPITAL ENCOUNTER (OUTPATIENT)
Dept: CT IMAGING | Facility: HOSPITAL | Age: 82
Discharge: HOME | End: 2022-05-27
Admitting: INTERNAL MEDICINE

## 2022-05-27 DIAGNOSIS — R76.8 ANA POSITIVE: ICD-10-CM

## 2022-05-27 DIAGNOSIS — J15.9 COMMUNITY ACQUIRED BACTERIAL PNEUMONIA: ICD-10-CM

## 2022-05-27 DIAGNOSIS — I10 ESSENTIAL HYPERTENSION: ICD-10-CM

## 2022-05-27 DIAGNOSIS — E11.9 TYPE 2 DIABETES MELLITUS WITHOUT COMPLICATION, WITHOUT LONG-TERM CURRENT USE OF INSULIN: ICD-10-CM

## 2022-05-27 DIAGNOSIS — K21.9 GERD WITHOUT ESOPHAGITIS: ICD-10-CM

## 2022-05-27 DIAGNOSIS — Z95.3 H/O AORTIC VALVE REPLACEMENT WITH PORCINE VALVE: ICD-10-CM

## 2022-05-27 DIAGNOSIS — R07.89 CHEST PAIN, ATYPICAL: ICD-10-CM

## 2022-05-27 LAB
CREAT BLDA-MCNC: 0.9 MG/DL
EGFRCR SERPLBLD CKD-EPI 2021: 63.6 ML/MIN/1.73

## 2022-05-27 PROCEDURE — 71250 CT THORAX DX C-: CPT

## 2022-05-27 PROCEDURE — 71260 CT THORAX DX C+: CPT

## 2022-05-27 PROCEDURE — 71270 CT THORAX DX C-/C+: CPT

## 2022-05-27 PROCEDURE — 0 IOPAMIDOL PER 1 ML: Performed by: INTERNAL MEDICINE

## 2022-05-27 PROCEDURE — 82565 ASSAY OF CREATININE: CPT

## 2022-05-27 RX ADMIN — IOPAMIDOL 100 ML: 755 INJECTION, SOLUTION INTRAVENOUS at 14:42

## 2022-06-21 ENCOUNTER — IMMUNIZATION (OUTPATIENT)
Dept: FAMILY MEDICINE CLINIC | Age: 82
End: 2022-06-21

## 2022-06-21 DIAGNOSIS — Z23 NEED FOR VACCINATION: Primary | ICD-10-CM

## 2022-06-21 PROCEDURE — 0054A COVID-19 (PFIZER) 12+ YRS: CPT | Performed by: FAMILY MEDICINE

## 2022-06-21 PROCEDURE — 91305 COVID-19 (PFIZER) 12+ YRS: CPT | Performed by: FAMILY MEDICINE

## 2022-06-21 RX ORDER — ROSUVASTATIN CALCIUM 5 MG/1
TABLET, COATED ORAL
Qty: 90 TABLET | Refills: 0 | Status: SHIPPED | OUTPATIENT
Start: 2022-06-21 | End: 2022-08-04 | Stop reason: SDUPTHER

## 2022-06-30 ENCOUNTER — OFFICE VISIT (OUTPATIENT)
Dept: PULMONOLOGY | Facility: CLINIC | Age: 82
End: 2022-06-30

## 2022-06-30 VITALS
BODY MASS INDEX: 25.07 KG/M2 | HEART RATE: 60 BPM | RESPIRATION RATE: 14 BRPM | HEIGHT: 66 IN | TEMPERATURE: 97.1 F | SYSTOLIC BLOOD PRESSURE: 118 MMHG | DIASTOLIC BLOOD PRESSURE: 64 MMHG | OXYGEN SATURATION: 94 % | WEIGHT: 156 LBS

## 2022-06-30 DIAGNOSIS — R07.89 CHEST PAIN, ATYPICAL: Primary | ICD-10-CM

## 2022-06-30 DIAGNOSIS — Z95.3 H/O AORTIC VALVE REPLACEMENT WITH PORCINE VALVE: ICD-10-CM

## 2022-06-30 DIAGNOSIS — J15.9 COMMUNITY ACQUIRED BACTERIAL PNEUMONIA: ICD-10-CM

## 2022-06-30 DIAGNOSIS — K21.9 GERD WITHOUT ESOPHAGITIS: ICD-10-CM

## 2022-06-30 PROCEDURE — 99214 OFFICE O/P EST MOD 30 MIN: CPT | Performed by: INTERNAL MEDICINE

## 2022-06-30 NOTE — PROGRESS NOTES
Pulmonary Consultation    No ref. provider found,    Thank you for asking me to see Zoila Whipple for   Chief Complaint   Patient presents with   • Chest Pain     6 week f/u    .      History of Present Illness  Zoila Whipple is a 81 y.o. female with a PMH significant for pneumonia and lung nodule presents for follow-up she complains of sharp aching pain right chest in the infraclavicular region unchanged from past 2 to 3 months pain is worse on pressure and appears to be sore she feels her breathing has improved significantly and she denies any significant cough expectoration or wheezing       Tobacco use history: Never smoker    Review of Systems: History obtained from chart review and the patient.  Review of Systems   Cardiovascular: Positive for chest pain.   All other systems reviewed and are negative.    As described in the HPI. Otherwise, remainder of ROS (14 systems) were negative.    Patient Active Problem List   Diagnosis   • Type 2 diabetes mellitus (HCC)   • S/P aortic valve replacement with prosthetic valve   • Mixed hyperlipidemia   • GERD without esophagitis   • Acquired hypothyroidism   • Essential hypertension   • H/O aortic valve replacement with porcine valve         Current Outpatient Medications:   •  ascorbic acid (VITAMIN C) 250 MG tablet, Take 250 mg by mouth Daily., Disp: , Rfl:   •  aspirin 81 MG EC tablet, Take 81 mg by mouth Daily., Disp: , Rfl:   •  cephalexin (KEFLEX) 250 MG capsule, , Disp: , Rfl:   •  cholecalciferol (VITAMIN D3) 25 MCG (1000 UT) tablet, Take 1,000 Units by mouth Daily., Disp: , Rfl:   •  levoFLOXacin (LEVAQUIN) 750 MG tablet, Take 1 tablet by mouth Daily., Disp: 7 tablet, Rfl: 0  •  levothyroxine (SYNTHROID, LEVOTHROID) 88 MCG tablet, Take 1 tablet by mouth Daily., Disp: 90 tablet, Rfl: 1  •  lisinopril (PRINIVIL,ZESTRIL) 20 MG tablet, Take 1 tablet by mouth Daily., Disp: 90 tablet, Rfl: 1  •  metFORMIN ER (GLUCOPHAGE-XR) 500 MG 24 hr tablet, Take 2 tablets by  "mouth Daily With Dinner., Disp: 180 tablet, Rfl: 0  •  metoprolol tartrate (LOPRESSOR) 25 MG tablet, Take 25 mg by mouth 2 (Two) Times a Day., Disp: , Rfl:   •  Omega-3 Fatty Acids (fish oil) 1000 MG capsule capsule, Take 1 capsule by mouth Daily., Disp: , Rfl:   •  pantoprazole (PROTONIX) 40 MG EC tablet, Take 1 tablet by mouth Daily., Disp: 90 tablet, Rfl: 1  •  potassium chloride 10 MEQ CR tablet, Take 10 mEq by mouth Daily., Disp: , Rfl:   •  rosuvastatin (CRESTOR) 5 MG tablet, TAKE 1 TABLET DAILY, Disp: 90 tablet, Rfl: 0  •  torsemide (DEMADEX) 20 MG tablet, Take 20 mg by mouth Daily., Disp: , Rfl:   •  vitamin E 1000 UNIT capsule, Take 1,000 Units by mouth Daily., Disp: , Rfl:     Allergies   Allergen Reactions   • Medrol [Methylprednisolone] Rash   • Niacin Rash   • Sulfamethoxazole-Trimethoprim Rash       Past Medical History:   Diagnosis Date   • Acquired hypothyroidism    • Essential hypertension    • GERD without esophagitis    • H/O aortic valve replacement with porcine valve    • Mixed hyperlipidemia    • Type 2 diabetes mellitus (HCC)      Past Surgical History:   Procedure Laterality Date   • AORTIC VALVE REPAIR/REPLACEMENT     • APPENDECTOMY     • HYSTERECTOMY     • INGUINAL HERNIA REPAIR Right    • LAMINECTOMY       Social History     Socioeconomic History   • Marital status:    • Number of children: 5   Tobacco Use   • Smoking status: Never Smoker   • Smokeless tobacco: Never Used   Vaping Use   • Vaping Use: Never used   Substance and Sexual Activity   • Alcohol use: Never     Family History   Problem Relation Age of Onset   • No Known Problems Mother         Mother  94 years old \"old age\"   • Heart attack Father         Father  49 years old MI          Objective     Blood pressure 118/64, pulse 60, temperature 97.1 °F (36.2 °C), temperature source Infrared, resp. rate 14, height 166.4 cm (65.5\"), weight 70.8 kg (156 lb), SpO2 94 %.  Physical Exam  Vitals and nursing note reviewed. "   Constitutional:       Appearance: Normal appearance.   HENT:      Head: Normocephalic and atraumatic.      Nose: Nose normal.      Mouth/Throat:      Mouth: Mucous membranes are moist.      Pharynx: Oropharynx is clear.   Eyes:      Extraocular Movements: Extraocular movements intact.      Conjunctiva/sclera: Conjunctivae normal.      Pupils: Pupils are equal, round, and reactive to light.   Cardiovascular:      Rate and Rhythm: Normal rate and regular rhythm.      Pulses: Normal pulses.      Heart sounds: Murmur heard.   Pulmonary:      Effort: Pulmonary effort is normal.      Breath sounds: Normal breath sounds.      Comments: Chest wall tenderness right  Abdominal:      General: Abdomen is flat. Bowel sounds are normal.      Palpations: Abdomen is soft.   Musculoskeletal:         General: Normal range of motion.      Cervical back: Normal range of motion and neck supple.   Skin:     General: Skin is warm.      Capillary Refill: Capillary refill takes 2 to 3 seconds.   Neurological:      General: No focal deficit present.      Mental Status: She is alert and oriented to person, place, and time.   Psychiatric:         Mood and Affect: Mood normal.         Behavior: Behavior normal.       Immunization History   Administered Date(s) Administered   • COVID-19 (MODERNA) 1st, 2nd, 3rd Dose Only 02/12/2021, 03/12/2021   • COVID-19 (PFIZER) PURPLE CAP 12/03/2021   • Covid-19 (Pfizer) Gray Cap 06/21/2022   • Fluzone High Dose =>65 Years (Vaxcare ONLY) 09/21/2017   • Fluzone High-Dose 65+yrs 09/29/2021   • Influenza Quad Vaccine (Inpatient) 10/08/2013   • Pneumococcal Polysaccharide (PPSV23) 09/21/2017           Radiology (independently reviewed and interpreted by me): CT scan reviewed shows benign left lung nodule unchanged from before and no evidence of interstitial lung disease         Assessment & Plan     Diagnoses and all orders for this visit:    1. Chest pain, atypical (Primary)    2. Community acquired bacterial  pneumonia    3. GERD without esophagitis    4. H/O aortic valve replacement with porcine valve         Discussion/ Recommendations:   Patient reassured regarding her CT scan  Patient is advised to continue her home medications including lisinopril metoprolol and Demadex  Chest wall pain seems likely secondary to may be a hairline rib fracture  Discussed vaccination and recommended    BMI is >= 25 and <30. (Overweight) The following options were offered after discussion;: nutrition counseling/recommendations           Return in about 3 months (around 9/30/2022).      Thank you for allowing me to participate in the care of Zoila Whipple. Please do not hesitate to contact me with any questions.         This document has been electronically signed by Arben Aleman MD on June 30, 2022 13:19 EDT

## 2022-07-08 ENCOUNTER — TELEPHONE (OUTPATIENT)
Dept: FAMILY MEDICINE CLINIC | Age: 82
End: 2022-07-08

## 2022-07-28 DIAGNOSIS — E03.9 ACQUIRED HYPOTHYROIDISM: ICD-10-CM

## 2022-07-28 RX ORDER — LEVOTHYROXINE SODIUM 88 MCG
TABLET ORAL
Qty: 90 TABLET | Refills: 0 | Status: SHIPPED | OUTPATIENT
Start: 2022-07-28 | End: 2022-08-04 | Stop reason: SDUPTHER

## 2022-08-04 ENCOUNTER — OFFICE VISIT (OUTPATIENT)
Dept: FAMILY MEDICINE CLINIC | Age: 82
End: 2022-08-04

## 2022-08-04 VITALS
HEART RATE: 57 BPM | DIASTOLIC BLOOD PRESSURE: 83 MMHG | BODY MASS INDEX: 25.55 KG/M2 | WEIGHT: 159 LBS | TEMPERATURE: 98 F | HEIGHT: 66 IN | SYSTOLIC BLOOD PRESSURE: 164 MMHG

## 2022-08-04 DIAGNOSIS — E78.2 MIXED HYPERLIPIDEMIA: ICD-10-CM

## 2022-08-04 DIAGNOSIS — I10 ESSENTIAL HYPERTENSION: ICD-10-CM

## 2022-08-04 DIAGNOSIS — Z00.00 PHYSICAL EXAM: Primary | ICD-10-CM

## 2022-08-04 DIAGNOSIS — E11.9 TYPE 2 DIABETES MELLITUS WITHOUT COMPLICATION, WITHOUT LONG-TERM CURRENT USE OF INSULIN: ICD-10-CM

## 2022-08-04 DIAGNOSIS — Z12.31 BREAST CANCER SCREENING BY MAMMOGRAM: ICD-10-CM

## 2022-08-04 DIAGNOSIS — E03.9 ACQUIRED HYPOTHYROIDISM: ICD-10-CM

## 2022-08-04 PROCEDURE — 1126F AMNT PAIN NOTED NONE PRSNT: CPT | Performed by: FAMILY MEDICINE

## 2022-08-04 PROCEDURE — 1170F FXNL STATUS ASSESSED: CPT | Performed by: FAMILY MEDICINE

## 2022-08-04 PROCEDURE — 99214 OFFICE O/P EST MOD 30 MIN: CPT | Performed by: FAMILY MEDICINE

## 2022-08-04 PROCEDURE — G0439 PPPS, SUBSEQ VISIT: HCPCS | Performed by: FAMILY MEDICINE

## 2022-08-04 PROCEDURE — 1159F MED LIST DOCD IN RCRD: CPT | Performed by: FAMILY MEDICINE

## 2022-08-04 RX ORDER — ROSUVASTATIN CALCIUM 5 MG/1
5 TABLET, COATED ORAL DAILY
Qty: 90 TABLET | Refills: 1 | Status: SHIPPED | OUTPATIENT
Start: 2022-08-04 | End: 2023-01-13 | Stop reason: SDUPTHER

## 2022-08-04 RX ORDER — PANTOPRAZOLE SODIUM 40 MG/1
40 TABLET, DELAYED RELEASE ORAL DAILY
Qty: 90 TABLET | Refills: 1 | Status: SHIPPED | OUTPATIENT
Start: 2022-08-04 | End: 2023-01-13 | Stop reason: SDUPTHER

## 2022-08-04 RX ORDER — METFORMIN HYDROCHLORIDE 500 MG/1
1000 TABLET, EXTENDED RELEASE ORAL
Qty: 180 TABLET | Refills: 1 | Status: SHIPPED | OUTPATIENT
Start: 2022-08-04 | End: 2023-01-13 | Stop reason: SDUPTHER

## 2022-08-04 RX ORDER — LISINOPRIL 20 MG/1
20 TABLET ORAL DAILY
Qty: 90 TABLET | Refills: 1 | Status: SHIPPED | OUTPATIENT
Start: 2022-08-04 | End: 2023-01-13 | Stop reason: SDUPTHER

## 2022-08-04 RX ORDER — LEVOTHYROXINE SODIUM 88 MCG
88 TABLET ORAL
Qty: 90 TABLET | Refills: 1 | Status: SHIPPED | OUTPATIENT
Start: 2022-08-04 | End: 2023-01-13 | Stop reason: SDUPTHER

## 2022-08-04 NOTE — PROGRESS NOTES
The ABCs of the Annual Wellness Visit  Subsequent Medicare Wellness Visit    Chief Complaint:  Medicare wellness exam, diabetes, cholesterol, blood pressure, thyroid    Subjective    History of Present Illness:  Zoila Whipple is a 81 y.o. female who presents for a Subsequent Medicare Wellness Visit.    The following portions of the patient's history were reviewed and updated as appropriate: allergies, current medications, past family history, past medical history, past social history, past surgical history and problem list.     Compared to one year ago, the patient feels her physical health is the same.    Compared to one year ago, the patient feels her mental health is worse.  Her daughter is dying with cancer.    Recent Hospitalizations:  She was not admitted to the hospital during the last year.       Current Medical Providers:  Patient Care Team:  Jewel Chavez MD as PCP - General (Family Medicine)    Outpatient Medications Prior to Visit   Medication Sig Dispense Refill   • ascorbic acid (VITAMIN C) 250 MG tablet Take 250 mg by mouth Daily.     • aspirin 81 MG EC tablet Take 81 mg by mouth Daily.     • cephalexin (KEFLEX) 250 MG capsule Take 250 mg by mouth Daily.     • cholecalciferol (VITAMIN D3) 25 MCG (1000 UT) tablet Take 1,000 Units by mouth Daily.     • metoprolol tartrate (LOPRESSOR) 25 MG tablet Take 25 mg by mouth 2 (Two) Times a Day.     • Omega-3 Fatty Acids (fish oil) 1000 MG capsule capsule Take 1 capsule by mouth Daily.     • potassium chloride 10 MEQ CR tablet Take 10 mEq by mouth Daily.     • torsemide (DEMADEX) 20 MG tablet Take 20 mg by mouth Daily.     • vitamin E 1000 UNIT capsule Take 1,000 Units by mouth Daily.     • lisinopril (PRINIVIL,ZESTRIL) 20 MG tablet Take 1 tablet by mouth Daily. 90 tablet 1   • metFORMIN ER (GLUCOPHAGE-XR) 500 MG 24 hr tablet Take 2 tablets by mouth Daily With Dinner. 180 tablet 0   • pantoprazole (PROTONIX) 40 MG EC tablet Take 1 tablet by mouth  Daily. 90 tablet 1   • rosuvastatin (CRESTOR) 5 MG tablet TAKE 1 TABLET DAILY 90 tablet 0   • Synthroid 88 MCG tablet TAKE 1 TABLET DAILY        (REPLACES THE 100MCG DOSE) 90 tablet 0   • levoFLOXacin (LEVAQUIN) 750 MG tablet Take 1 tablet by mouth Daily. 7 tablet 0     No facility-administered medications prior to visit.       No opioid medication identified on active medication list. I have reviewed chart for other potential  high risk medication/s and harmful drug interactions in the elderly.          Aspirin is on active medication list. Aspirin use is indicated based on review of current medical condition/s. Pros and cons of this therapy have been discussed today. Benefits of this medication outweigh potential harm.  Patient has been encouraged to continue taking this medication.  .      Patient Active Problem List   Diagnosis   • Type 2 diabetes mellitus (HCC)   • S/P aortic valve replacement with prosthetic valve   • Mixed hyperlipidemia   • GERD without esophagitis   • Acquired hypothyroidism   • Essential hypertension   • H/O aortic valve replacement with porcine valve     Advance Care Planning   Advance Directive is not on file.  ACP discussion was held with the patient during this visit. Patient has an advance directive (not in EMR), copy requested.  She says one of her children would help make decisions if needed.    In addition to the above, Zoila is here for follow-up on her usual care.  She has type 2 diabetes for which she remains on metformin as noted above.  We have recommended she check her blood sugar once daily.  Her sugar has been around 100 when she is checked it most recently.    She also has hypothyroidism and remains on brand-name Synthroid.  Her most recent TSH was normal in April of this year.  She did miss a few days of the medication recently as she was out of it.    Her blood pressure is mildly elevated today.  She does not check it routinely at home.  She is under some increased stress  "with her daughter's transition to hospice care.  The most recent blood pressure readings here have been normal.    Review of Systems:  Review of Systems   Constitutional: Negative for chills and fever.   Respiratory: Negative for cough and shortness of breath.    Cardiovascular: Negative for chest pain and palpitations.   Gastrointestinal: Negative for abdominal pain, nausea and vomiting.         Objective       Vitals:    08/04/22 1046 08/04/22 1140   BP: 149/62 164/83   BP Location: Left arm Right arm   Patient Position: Sitting Sitting   Pulse: 56 57   Temp: 98 °F (36.7 °C)    TempSrc: Oral    Weight: 72.1 kg (159 lb)    Height: 166.4 cm (65.51\")    PainSc: 0-No pain        BMI Readings from Last 1 Encounters:   08/04/22 26.05 kg/m²   BMI is above normal parameters. Recommendations include: exercise counseling and nutrition counseling    Does the patient have evidence of cognitive impairment? No    Physical Exam  Vitals and nursing note reviewed.   Constitutional:       General: She is not in acute distress.     Appearance: She is not ill-appearing.   HENT:      Right Ear: Tympanic membrane and ear canal normal.      Left Ear: Tympanic membrane and ear canal normal.      Ears:      Comments: Hearing is normal with forced whisper.  Hearing aids are in place.     Mouth/Throat:      Mouth: Mucous membranes are moist.      Comments: Pharynx appears normal  Eyes:      Extraocular Movements: Extraocular movements intact.      Pupils: Pupils are equal, round, and reactive to light.      Comments: Binocular vision is 20/20 with correction.   Neck:      Thyroid: No thyromegaly.   Cardiovascular:      Rate and Rhythm: Normal rate and regular rhythm.      Heart sounds: Murmur heard.   Pulmonary:      Effort: Pulmonary effort is normal.      Breath sounds: Normal breath sounds.   Abdominal:      General: There is no distension.      Palpations: Abdomen is soft. There is no mass.      Tenderness: There is no abdominal " tenderness.   Musculoskeletal:      Cervical back: Normal range of motion.   Skin:     Findings: No lesion or rash.   Neurological:      General: No focal deficit present.      Mental Status: She is oriented to person, place, and time.      Cranial Nerves: No cranial nerve deficit.   Psychiatric:         Mood and Affect: Mood normal.       The following data was reviewed by Jewel Chavez MD on 08/04/2022.  Lab Results   Component Value Date    WBC 5.69 04/28/2022    HGB 12.5 04/28/2022    HCT 39.5 04/28/2022    MCV 85.5 04/28/2022     04/28/2022     Lab Results   Component Value Date    GLUCOSE 112 (H) 01/24/2022    BUN 20 01/24/2022    CREATININE 0.90 05/27/2022     01/24/2022    K 4.4 01/24/2022     01/24/2022    CO2 25.9 01/24/2022    CALCIUM 9.2 01/24/2022    PROTEINTOT 6.8 01/24/2022    ALBUMIN 3.80 01/24/2022    ALT 14 01/24/2022    AST 17 01/24/2022    ALKPHOS 118 (H) 01/24/2022    BILITOT 0.3 01/24/2022    EGFRIFNONA 61 01/24/2022    GLOB 3.0 01/24/2022    AGRATIO 1.3 01/24/2022    BCR 22.7 01/24/2022    ANIONGAP 10.1 01/24/2022      Lab Results   Component Value Date    CHOL 106 01/24/2022    CHLPL 119 03/10/2021    TRIG 272 (H) 01/24/2022    HDL 31 (L) 01/24/2022    LDL 34 01/24/2022     Lab Results   Component Value Date    TSH 0.311 04/28/2022     Lab Results   Component Value Date    HGBA1C 6.40 (H) 04/28/2022          HEALTH RISK ASSESSMENT    Smoking Status:  Social History     Tobacco Use   Smoking Status Never Smoker   Smokeless Tobacco Never Used     Alcohol Consumption:  Social History     Substance and Sexual Activity   Alcohol Use Never     Fall Risk Screen:    STEADI Fall Risk Assessment was completed, and patient is at LOW risk for falls.Assessment completed on:1/24/2022    Depression Screening:  PHQ-2/PHQ-9 Depression Screening 1/24/2022   Retired PHQ-9 Total Score 0   Retired Total Score 0       Health Habits and Functional and Cognitive Screening:  Functional &  Cognitive Status 8/4/2022   Do you have difficulty preparing food and eating? No   Do you have difficulty bathing yourself, getting dressed or grooming yourself? No   Do you have difficulty using the toilet? No   Do you have difficulty moving around from place to place? No   Do you have trouble with steps or getting out of a bed or a chair? No   Current Diet Well Balanced Diet   Dental Exam Not up to date   Eye Exam Up to date   Exercise (times per week) 0 times per week   Current Exercises Include No Regular Exercise   Do you need help using the phone?  No   Are you deaf or do you have serious difficulty hearing?  No   Do you need help with transportation? No   Do you need help shopping? No   Do you need help preparing meals?  No   Do you need help with housework?  No   Do you need help with laundry? No   Do you need help taking your medications? No   Do you need help managing money? No   Do you ever drive or ride in a car without wearing a seat belt? No   Have you felt unusual stress, anger or loneliness in the last month? Yes   Who do you live with? Alone   If you need help, do you have trouble finding someone available to you? No   Have you been bothered in the last four weeks by sexual problems? No   Do you have difficulty concentrating, remembering or making decisions? No       Age-appropriate Screening Schedule:  Refer to the list below for future screening recommendations based on patient's age, sex and/or medical conditions. Orders for these recommended tests are listed in the plan section. The patient has been provided with a written plan.    Health Maintenance   Topic Date Due   • TDAP/TD VACCINES (1 - Tdap) Never done   • ZOSTER VACCINE (1 of 2) Never done   • MAMMOGRAM  09/08/2022   • DIABETIC EYE EXAM  09/14/2022   • INFLUENZA VACCINE  10/01/2022   • HEMOGLOBIN A1C  10/28/2022   • LIPID PANEL  01/24/2023   • URINE MICROALBUMIN  01/24/2023   • DXA SCAN  07/06/2023                     Assessment and  Plan:       CMS Preventative Services Quick Reference  Risk Factors Identified During Encounter  Cardiovascular Disease  Hearing Problem  Immunizations Discussed/Encouraged (specific Immunizations; Tdap, Influenza and Shingrix    The above risks/problems have been discussed with the patient.  Follow up actions/plans if indicated are seen below in the Assessment/Plan Section.  Pertinent information has been shared with the patient in the After Visit Summary.    Today, we have reviewed her care.  With regard to the wellness exam, we will move ahead with mammogram in about 2 months.  She says that she is up-to-date on colonoscopy at this time.  We also discussed vaccines that she may want to consider.  Specifically, she may want to consider a COVID omicron booster in the fall if that is released.  She would also be due for a flu shot then.  We may consider a pneumonia shot in the next year or so.  She should consider a tetanus booster were she to have a cut or scrape, particularly with luisa metal.  Also, she may want to check with her pharmacy about the updated shingles vaccine, Shingrix.    We have also reviewed her other problems.  Her blood pressure is a little high today.  We will repeat it before she leaves.  Because she had a few days where she was without her thyroid medication, we will hold off on repeat blood work for about 2 months.  We will call her at that time.  Her usual medications are refilled as noted below, and we will move forward from there.  Tentatively, we will plan to see her back in about 6 months.    Addendum: Please let Zoila Dixon know that her blood pressure persisted in being elevated.  I do not want to be too quick to make changes or give additional medication, but I would recommend she follow-up on this in a couple of weeks.  Please TICKLE for her to stop by the allergy room in about 2 weeks for a blood pressure check.  If she has a home cuff, I would recommend she bring it at that time.   If we see that her blood pressure is persistently elevated, then we may consider additional treatment.  Let me know if she has other concerns.  Thanks.    Diagnoses and all orders for this visit:    1. Physical exam (Primary)    2. Type 2 diabetes mellitus without complication, without long-term current use of insulin (HCC)  -     metFORMIN ER (GLUCOPHAGE-XR) 500 MG 24 hr tablet; Take 2 tablets by mouth Daily With Dinner.  Dispense: 180 tablet; Refill: 1  -     Hemoglobin A1c; Future    3. Acquired hypothyroidism  -     Synthroid 88 MCG tablet; Take 1 tablet by mouth Every Morning. Brand name only.  Dispense: 90 tablet; Refill: 1  -     TSH; Future    4. Mixed hyperlipidemia  -     rosuvastatin (CRESTOR) 5 MG tablet; Take 1 tablet by mouth Daily.  Dispense: 90 tablet; Refill: 1  -     Comprehensive Metabolic Panel; Future    5. Essential hypertension  -     lisinopril (PRINIVIL,ZESTRIL) 20 MG tablet; Take 1 tablet by mouth Daily.  Dispense: 90 tablet; Refill: 1  -     Comprehensive Metabolic Panel; Future    Other orders  -     pantoprazole (PROTONIX) 40 MG EC tablet; Take 1 tablet by mouth Daily.  Dispense: 90 tablet; Refill: 1        Follow Up:   Return in about 6 months (around 2/4/2023) for Recheck.     An After Visit Summary and PPPS were given to the patient.

## 2022-08-19 ENCOUNTER — TELEPHONE (OUTPATIENT)
Dept: FAMILY MEDICINE CLINIC | Age: 82
End: 2022-08-19

## 2022-08-19 NOTE — TELEPHONE ENCOUNTER
----- Message from Marley Arreola LPN sent at 8/5/2022  9:51 AM EDT -----  Please TICKLE for her to stop by the allergy room in about 2 weeks for a blood pressure check.

## 2022-08-23 ENCOUNTER — CLINICAL SUPPORT (OUTPATIENT)
Dept: FAMILY MEDICINE CLINIC | Age: 82
End: 2022-08-23

## 2022-08-23 VITALS — SYSTOLIC BLOOD PRESSURE: 133 MMHG | DIASTOLIC BLOOD PRESSURE: 61 MMHG | HEART RATE: 63 BPM

## 2022-08-23 DIAGNOSIS — I10 ESSENTIAL HYPERTENSION: ICD-10-CM

## 2022-08-23 PROCEDURE — 99211 OFF/OP EST MAY X REQ PHY/QHP: CPT | Performed by: FAMILY MEDICINE

## 2022-08-24 NOTE — PROGRESS NOTES
Vitals:    08/23/22 0954   BP: 133/61   BP Location: Left arm   Patient Position: Sitting   Pulse: 63     Good morning, Zoila Atkinson.  Your blood pressure was good on recheck yesterday.  I would advise no change in your care or medications at this time.  Let me know if you have other concerns.    Jewel Chavez MD  [facility]   [Sent as [employer] message]

## 2022-09-10 DIAGNOSIS — J18.9 PNEUMONIA OF LEFT LOWER LOBE DUE TO INFECTIOUS ORGANISM: ICD-10-CM

## 2022-09-10 DIAGNOSIS — J06.9 UPPER RESPIRATORY TRACT INFECTION, UNSPECIFIED TYPE: ICD-10-CM

## 2022-09-12 RX ORDER — LEVOFLOXACIN 750 MG/1
TABLET ORAL
Qty: 7 TABLET | Refills: 0 | OUTPATIENT
Start: 2022-09-12

## 2022-09-13 RX ORDER — DOXYCYCLINE HYCLATE 100 MG/1
CAPSULE ORAL
Qty: 14 CAPSULE | Refills: 0 | OUTPATIENT
Start: 2022-09-13

## 2022-09-14 ENCOUNTER — HOSPITAL ENCOUNTER (OUTPATIENT)
Dept: MAMMOGRAPHY | Facility: HOSPITAL | Age: 82
Discharge: HOME | End: 2022-09-14
Admitting: FAMILY MEDICINE

## 2022-09-14 DIAGNOSIS — Z12.31 BREAST CANCER SCREENING BY MAMMOGRAM: ICD-10-CM

## 2022-09-14 PROCEDURE — 77063 BREAST TOMOSYNTHESIS BI: CPT

## 2022-09-14 PROCEDURE — 77067 SCR MAMMO BI INCL CAD: CPT

## 2022-10-03 ENCOUNTER — TELEPHONE (OUTPATIENT)
Dept: FAMILY MEDICINE CLINIC | Age: 82
End: 2022-10-03

## 2022-10-03 NOTE — TELEPHONE ENCOUNTER
----- Message from Marley Arreola LPN sent at 8/4/2022 11:55 AM EDT -----  Please TICKLE for ordered labs in 2 months.  Thanks.

## 2022-10-06 ENCOUNTER — LAB (OUTPATIENT)
Dept: LAB | Facility: HOSPITAL | Age: 82
End: 2022-10-06

## 2022-10-06 DIAGNOSIS — I10 ESSENTIAL HYPERTENSION: ICD-10-CM

## 2022-10-06 DIAGNOSIS — E03.9 ACQUIRED HYPOTHYROIDISM: ICD-10-CM

## 2022-10-06 DIAGNOSIS — E78.2 MIXED HYPERLIPIDEMIA: ICD-10-CM

## 2022-10-06 DIAGNOSIS — E11.9 TYPE 2 DIABETES MELLITUS WITHOUT COMPLICATION, WITHOUT LONG-TERM CURRENT USE OF INSULIN: ICD-10-CM

## 2022-10-06 LAB
ALBUMIN SERPL-MCNC: 4 G/DL (ref 3.5–5.2)
ALBUMIN/GLOB SERPL: 1.5 G/DL
ALP SERPL-CCNC: 86 U/L (ref 39–117)
ALT SERPL W P-5'-P-CCNC: 11 U/L (ref 1–33)
ANION GAP SERPL CALCULATED.3IONS-SCNC: 7.9 MMOL/L (ref 5–15)
AST SERPL-CCNC: 20 U/L (ref 1–32)
BILIRUB SERPL-MCNC: 0.3 MG/DL (ref 0–1.2)
BUN SERPL-MCNC: 15 MG/DL (ref 8–23)
BUN/CREAT SERPL: 22.1 (ref 7–25)
CALCIUM SPEC-SCNC: 9.1 MG/DL (ref 8.6–10.5)
CHLORIDE SERPL-SCNC: 103 MMOL/L (ref 98–107)
CO2 SERPL-SCNC: 28.1 MMOL/L (ref 22–29)
CREAT SERPL-MCNC: 0.68 MG/DL (ref 0.57–1)
EGFRCR SERPLBLD CKD-EPI 2021: 86.5 ML/MIN/1.73
GLOBULIN UR ELPH-MCNC: 2.7 GM/DL
GLUCOSE SERPL-MCNC: 102 MG/DL (ref 65–99)
HBA1C MFR BLD: 6.3 % (ref 4.8–5.6)
POTASSIUM SERPL-SCNC: 4.3 MMOL/L (ref 3.5–5.2)
PROT SERPL-MCNC: 6.7 G/DL (ref 6–8.5)
SODIUM SERPL-SCNC: 139 MMOL/L (ref 136–145)
TSH SERPL DL<=0.05 MIU/L-ACNC: 0.57 UIU/ML (ref 0.27–4.2)

## 2022-10-06 PROCEDURE — 84443 ASSAY THYROID STIM HORMONE: CPT

## 2022-10-06 PROCEDURE — 36415 COLL VENOUS BLD VENIPUNCTURE: CPT

## 2022-10-06 PROCEDURE — 80053 COMPREHEN METABOLIC PANEL: CPT

## 2022-10-06 PROCEDURE — 83036 HEMOGLOBIN GLYCOSYLATED A1C: CPT

## 2022-10-20 ENCOUNTER — CLINICAL SUPPORT (OUTPATIENT)
Dept: FAMILY MEDICINE CLINIC | Age: 82
End: 2022-10-20

## 2022-10-20 DIAGNOSIS — Z23 NEED FOR INFLUENZA VACCINATION: Primary | ICD-10-CM

## 2022-10-20 PROCEDURE — 90662 IIV NO PRSV INCREASED AG IM: CPT | Performed by: FAMILY MEDICINE

## 2022-10-20 PROCEDURE — G0008 ADMIN INFLUENZA VIRUS VAC: HCPCS | Performed by: FAMILY MEDICINE

## 2023-01-13 ENCOUNTER — OFFICE VISIT (OUTPATIENT)
Dept: FAMILY MEDICINE CLINIC | Age: 83
End: 2023-01-13
Payer: MEDICARE

## 2023-01-13 ENCOUNTER — LAB (OUTPATIENT)
Dept: LAB | Facility: HOSPITAL | Age: 83
End: 2023-01-13
Payer: MEDICARE

## 2023-01-13 VITALS
HEART RATE: 75 BPM | SYSTOLIC BLOOD PRESSURE: 155 MMHG | WEIGHT: 162.4 LBS | BODY MASS INDEX: 26.1 KG/M2 | DIASTOLIC BLOOD PRESSURE: 51 MMHG | TEMPERATURE: 98.2 F | HEIGHT: 66 IN

## 2023-01-13 DIAGNOSIS — D64.9 ANEMIA, UNSPECIFIED TYPE: ICD-10-CM

## 2023-01-13 DIAGNOSIS — R42 DIZZINESS: ICD-10-CM

## 2023-01-13 DIAGNOSIS — I10 ESSENTIAL HYPERTENSION: ICD-10-CM

## 2023-01-13 DIAGNOSIS — E11.9 TYPE 2 DIABETES MELLITUS WITHOUT COMPLICATION, WITHOUT LONG-TERM CURRENT USE OF INSULIN: ICD-10-CM

## 2023-01-13 DIAGNOSIS — E78.2 MIXED HYPERLIPIDEMIA: ICD-10-CM

## 2023-01-13 DIAGNOSIS — K21.9 GERD WITHOUT ESOPHAGITIS: ICD-10-CM

## 2023-01-13 DIAGNOSIS — Z95.2 S/P AORTIC VALVE REPLACEMENT WITH PROSTHETIC VALVE: ICD-10-CM

## 2023-01-13 DIAGNOSIS — R20.0 RIGHT FACIAL NUMBNESS: ICD-10-CM

## 2023-01-13 DIAGNOSIS — R42 DIZZINESS: Primary | ICD-10-CM

## 2023-01-13 DIAGNOSIS — R55 NEAR SYNCOPE: ICD-10-CM

## 2023-01-13 DIAGNOSIS — E03.9 ACQUIRED HYPOTHYROIDISM: ICD-10-CM

## 2023-01-13 LAB
DEPRECATED RDW RBC AUTO: 39.5 FL (ref 37–54)
ERYTHROCYTE [DISTWIDTH] IN BLOOD BY AUTOMATED COUNT: 13.4 % (ref 12.3–15.4)
HBA1C MFR BLD: 6.5 % (ref 4.8–5.6)
HCT VFR BLD AUTO: 36.1 % (ref 34–46.6)
HGB BLD-MCNC: 11.6 G/DL (ref 12–15.9)
MCH RBC QN AUTO: 26.5 PG (ref 26.6–33)
MCHC RBC AUTO-ENTMCNC: 32.1 G/DL (ref 31.5–35.7)
MCV RBC AUTO: 82.4 FL (ref 79–97)
PLATELET # BLD AUTO: 166 10*3/MM3 (ref 140–450)
PMV BLD AUTO: 13 FL (ref 6–12)
RBC # BLD AUTO: 4.38 10*6/MM3 (ref 3.77–5.28)
WBC NRBC COR # BLD: 5.32 10*3/MM3 (ref 3.4–10.8)

## 2023-01-13 PROCEDURE — 80061 LIPID PANEL: CPT

## 2023-01-13 PROCEDURE — 80053 COMPREHEN METABOLIC PANEL: CPT

## 2023-01-13 PROCEDURE — 84439 ASSAY OF FREE THYROXINE: CPT

## 2023-01-13 PROCEDURE — 83036 HEMOGLOBIN GLYCOSYLATED A1C: CPT

## 2023-01-13 PROCEDURE — 83735 ASSAY OF MAGNESIUM: CPT

## 2023-01-13 PROCEDURE — 85027 COMPLETE CBC AUTOMATED: CPT

## 2023-01-13 PROCEDURE — 36415 COLL VENOUS BLD VENIPUNCTURE: CPT

## 2023-01-13 PROCEDURE — 93000 ELECTROCARDIOGRAM COMPLETE: CPT | Performed by: FAMILY MEDICINE

## 2023-01-13 PROCEDURE — 84443 ASSAY THYROID STIM HORMONE: CPT

## 2023-01-13 PROCEDURE — 99214 OFFICE O/P EST MOD 30 MIN: CPT | Performed by: FAMILY MEDICINE

## 2023-01-13 RX ORDER — LISINOPRIL 20 MG/1
20 TABLET ORAL DAILY
Qty: 90 TABLET | Refills: 1 | Status: SHIPPED | OUTPATIENT
Start: 2023-01-13 | End: 2023-02-21 | Stop reason: SDUPTHER

## 2023-01-13 RX ORDER — ROSUVASTATIN CALCIUM 5 MG/1
5 TABLET, COATED ORAL DAILY
Qty: 90 TABLET | Refills: 1 | Status: SHIPPED | OUTPATIENT
Start: 2023-01-13 | End: 2023-02-21 | Stop reason: SDUPTHER

## 2023-01-13 RX ORDER — LEVOTHYROXINE SODIUM 88 UG/1
88 TABLET ORAL
Qty: 90 TABLET | Refills: 1 | Status: SHIPPED | OUTPATIENT
Start: 2023-01-13 | End: 2023-01-14

## 2023-01-13 RX ORDER — PANTOPRAZOLE SODIUM 40 MG/1
40 TABLET, DELAYED RELEASE ORAL DAILY
Qty: 90 TABLET | Refills: 1 | Status: SHIPPED | OUTPATIENT
Start: 2023-01-13 | End: 2023-02-21 | Stop reason: SDUPTHER

## 2023-01-13 RX ORDER — METFORMIN HYDROCHLORIDE 500 MG/1
1000 TABLET, EXTENDED RELEASE ORAL
Qty: 180 TABLET | Refills: 1 | Status: SHIPPED | OUTPATIENT
Start: 2023-01-13 | End: 2023-02-21 | Stop reason: SDUPTHER

## 2023-01-13 NOTE — PROGRESS NOTES
"Zoila Whipple presents to [facility].    Chief Complaint:  Dizzy feeling    Subjective       History of Present Illness:  Zoila Dixon is in today for follow up on her care.  She has had several episodes recently of feeling dizzy and thinking that she might pass out.  She is not sure what is causing this.  She says the episodes when they happen will last just a few seconds and then resolve.  She did have an issue with numbness along the right side of her face and lips with 1 of these episodes.  She states that strokes do tend to run in the family, and she is concerned.  She has known heart issues as noted below including previous valve replacement.  She sees Sky Bowman locally and is scheduled to see him again in about a month.    Review of Systems:  Review of Systems   Constitutional: Negative for chills and fever.   Respiratory: Negative for cough and shortness of breath (not unless I do a lot of walking up and down the steps).    Cardiovascular: Negative for chest pain and palpitations.   Gastrointestinal: Negative for abdominal pain, nausea and vomiting.        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   • Mixed hyperlipidemia   • Type 2 diabetes mellitus (HCC)     Past Surgical History:   • AORTIC VALVE REPAIR/REPLACEMENT   • APPENDECTOMY   • HYSTERECTOMY   • INGUINAL HERNIA REPAIR   • LAMINECTOMY      Family History   Problem Relation Age of Onset   • No Known Problems Mother         Mother  94 years old \"old age\"   • Heart attack Father         Father  49 years old MI     Social History     Tobacco Use   • Smoking status: Never   • Smokeless tobacco: Never   Substance Use Topics   • Alcohol use: Never       Health Maintenance Due   Topic Date Due   • TDAP/TD VACCINES (1 - Tdap) Never done   • ZOSTER VACCINE (1 of 2) Never done "   • COVID-19 Vaccine (5 - Booster for Moderna series) 08/16/2022   • DIABETIC EYE EXAM  09/14/2022        Immunization History   Administered Date(s) Administered   • COVID-19 (MODERNA) 1st, 2nd, 3rd Dose Only 02/12/2021, 03/12/2021   • COVID-19 (PFIZER) PURPLE CAP 12/03/2021   • Covid-19 (Pfizer) Gray Cap 06/21/2022   • Fluzone High Dose =>65 Years ([employer] ONLY) 09/21/2017   • Fluzone High-Dose 65+yrs 09/29/2021, 10/20/2022   • Influenza Quad Vaccine (Inpatient) 10/08/2013   • Pneumococcal Polysaccharide (PPSV23) 09/21/2017       Allergies   Allergen Reactions   • Medrol [Methylprednisolone] Rash   • Niacin Rash   • Sulfamethoxazole-Trimethoprim Rash        Medications:  Current Outpatient Medications on File Prior to Visit   Medication Sig   • ascorbic acid (VITAMIN C) 250 MG tablet Take 250 mg by mouth Daily.   • aspirin 81 MG EC tablet Take 81 mg by mouth Daily.   • cephalexin (KEFLEX) 250 MG capsule Take 250 mg by mouth Daily.   • cholecalciferol (VITAMIN D3) 25 MCG (1000 UT) tablet Take 1,000 Units by mouth Daily.   • metoprolol tartrate (LOPRESSOR) 25 MG tablet Take 25 mg by mouth 2 (Two) Times a Day.   • Omega-3 Fatty Acids (fish oil) 1000 MG capsule capsule Take 1 capsule by mouth Daily.   • potassium chloride 10 MEQ CR tablet Take 10 mEq by mouth Daily.   • torsemide (DEMADEX) 20 MG tablet Take 20 mg by mouth Daily.   • vitamin E 1000 UNIT capsule Take 1,000 Units by mouth Daily.   • [DISCONTINUED] lisinopril (PRINIVIL,ZESTRIL) 20 MG tablet Take 1 tablet by mouth Daily.   • [DISCONTINUED] metFORMIN ER (GLUCOPHAGE-XR) 500 MG 24 hr tablet Take 2 tablets by mouth Daily With Dinner.   • [DISCONTINUED] pantoprazole (PROTONIX) 40 MG EC tablet Take 1 tablet by mouth Daily.   • [DISCONTINUED] rosuvastatin (CRESTOR) 5 MG tablet Take 1 tablet by mouth Daily.   • [DISCONTINUED] Synthroid 88 MCG tablet Take 1 tablet by mouth Every Morning. Brand name only.     No current facility-administered medications on file  "prior to visit.       Vital Signs:   /51 (BP Location: Right arm, Patient Position: Sitting)   Pulse 75   Temp 98.2 °F (36.8 °C) (Oral)   Ht 166.4 cm (65.51\")   Wt 73.7 kg (162 lb 6.4 oz)   BMI 26.60 kg/m²       Physical Exam:  Physical Exam  Vitals and nursing note reviewed.   Constitutional:       General: She is not in acute distress.     Appearance: She is not ill-appearing.   HENT:      Right Ear: Tympanic membrane and ear canal normal.      Left Ear: Tympanic membrane and ear canal normal.      Mouth/Throat:      Mouth: Mucous membranes are moist.      Comments: Pharynx appears normal  Eyes:      Extraocular Movements: Extraocular movements intact.      Pupils: Pupils are equal, round, and reactive to light.   Neck:      Thyroid: No thyromegaly.   Cardiovascular:      Rate and Rhythm: Normal rate and regular rhythm.      Heart sounds: Murmur (4/6 systolic) heard.   Pulmonary:      Effort: Pulmonary effort is normal.      Breath sounds: Normal breath sounds.   Abdominal:      General: There is no distension.      Palpations: Abdomen is soft. There is no mass.      Tenderness: There is no abdominal tenderness.   Musculoskeletal:      Cervical back: Normal range of motion.   Skin:     Findings: No lesion or rash.   Neurological:      General: No focal deficit present.      Mental Status: She is oriented to person, place, and time.      Cranial Nerves: No cranial nerve deficit.      Motor: No weakness.   Psychiatric:         Mood and Affect: Mood normal.         Result Review      The following data was reviewed by Jewel Chavez MD on 01/13/2023.  Lab Results   Component Value Date    WBC 5.69 04/28/2022    HGB 12.5 04/28/2022    HCT 39.5 04/28/2022    MCV 85.5 04/28/2022     04/28/2022     Lab Results   Component Value Date    GLUCOSE 102 (H) 10/06/2022    BUN 15 10/06/2022    CREATININE 0.68 10/06/2022     10/06/2022    K 4.3 10/06/2022     10/06/2022    CO2 28.1 10/06/2022 "    CALCIUM 9.1 10/06/2022    PROTEINTOT 6.7 10/06/2022    ALBUMIN 4.00 10/06/2022    ALT 11 10/06/2022    AST 20 10/06/2022    ALKPHOS 86 10/06/2022    BILITOT 0.3 10/06/2022    EGFRIFNONA 61 01/24/2022    GLOB 2.7 10/06/2022    AGRATIO 1.5 10/06/2022    BCR 22.1 10/06/2022    ANIONGAP 7.9 10/06/2022      Lab Results   Component Value Date    CHOL 106 01/24/2022    CHLPL 119 03/10/2021    TRIG 272 (H) 01/24/2022    HDL 31 (L) 01/24/2022    LDL 34 01/24/2022     Lab Results   Component Value Date    TSH 0.572 10/06/2022     Lab Results   Component Value Date    HGBA1C 6.30 (H) 10/06/2022       ECG 12 Lead    Date/Time: 1/13/2023 4:12 PM  Performed by: Jewel Chavez MD  Authorized by: Jewel Chavez MD   Comparison: not compared with previous ECG   Previous ECG: no previous ECG available  Rhythm: sinus rhythm  Rate: normal  BPM: 73  Conduction: left bundle branch block    Clinical impression: abnormal EKG  Clinical impression comment: Left bundle branch block as noted above.  No comparison immediately available.  This is a known finding per most recent cardiology visit though.                Assessment and Plan:   Today, we have reviewed her care.  I am concerned by the symptoms that she describes.  We will move ahead with additional evaluation as noted below.  I want to rule out obvious irregular heartbeat and also evaluate her carotid arteries.  We will see what the testing shows and move forward from there.  No other short-term change is anticipated.       Diagnoses and all orders for this visit:    1. Dizziness (Primary)  -     MRI Brain Without Contrast; Future  -     CBC (No Diff); Future  -     Duplex Carotid Ultrasound CAR; Future    2. Right facial numbness  -     MRI Brain Without Contrast; Future  -     Duplex Carotid Ultrasound CAR; Future    3. Type 2 diabetes mellitus without complication, without long-term current use of insulin (HCC)  -     Hemoglobin A1c; Future  -     metFORMIN ER  (GLUCOPHAGE-XR) 500 MG 24 hr tablet; Take 2 tablets by mouth Daily With Dinner.  Dispense: 180 tablet; Refill: 1    4. S/P aortic valve replacement with prosthetic valve  Comments:  As above.    5. Mixed hyperlipidemia  -     Comprehensive Metabolic Panel; Future  -     Lipid Panel; Future  -     Magnesium; Future  -     rosuvastatin (CRESTOR) 5 MG tablet; Take 1 tablet by mouth Daily.  Dispense: 90 tablet; Refill: 1    6. Acquired hypothyroidism  -     TSH+Free T4; Future  -     levothyroxine (Synthroid) 88 MCG tablet; Take 1 tablet by mouth Every Morning. Brand name only.  Dispense: 90 tablet; Refill: 1    7. Essential hypertension  -     Comprehensive Metabolic Panel; Future  -     lisinopril (PRINIVIL,ZESTRIL) 20 MG tablet; Take 1 tablet by mouth Daily.  Dispense: 90 tablet; Refill: 1    8. Near syncope  -     ECG 12 Lead  -     Holter monitor - 48 hour; Future  -     MRI Brain Without Contrast; Future  -     Duplex Carotid Ultrasound CAR; Future    9. GERD without esophagitis  -     pantoprazole (PROTONIX) 40 MG EC tablet; Take 1 tablet by mouth Daily.  Dispense: 90 tablet; Refill: 1          Follow Up   Return if symptoms worsen or fail to improve.  Patient was given instructions and counseling regarding her condition or for health maintenance advice. Please see specific information pulled into the AVS if appropriate.

## 2023-01-14 LAB
ALBUMIN SERPL-MCNC: 4.3 G/DL (ref 3.5–5.2)
ALBUMIN/GLOB SERPL: 1.7 G/DL
ALP SERPL-CCNC: 101 U/L (ref 39–117)
ALT SERPL W P-5'-P-CCNC: 14 U/L (ref 1–33)
ANION GAP SERPL CALCULATED.3IONS-SCNC: 9.6 MMOL/L (ref 5–15)
AST SERPL-CCNC: 22 U/L (ref 1–32)
BILIRUB SERPL-MCNC: 0.2 MG/DL (ref 0–1.2)
BUN SERPL-MCNC: 25 MG/DL (ref 8–23)
BUN/CREAT SERPL: 23.8 (ref 7–25)
CALCIUM SPEC-SCNC: 9.3 MG/DL (ref 8.6–10.5)
CHLORIDE SERPL-SCNC: 102 MMOL/L (ref 98–107)
CHOLEST SERPL-MCNC: 110 MG/DL (ref 0–200)
CO2 SERPL-SCNC: 27.4 MMOL/L (ref 22–29)
CREAT SERPL-MCNC: 1.05 MG/DL (ref 0.57–1)
EGFRCR SERPLBLD CKD-EPI 2021: 52.5 ML/MIN/1.73
GLOBULIN UR ELPH-MCNC: 2.5 GM/DL
GLUCOSE SERPL-MCNC: 122 MG/DL (ref 65–99)
HDLC SERPL-MCNC: 27 MG/DL (ref 40–60)
LDLC SERPL CALC-MCNC: 27 MG/DL (ref 0–100)
LDLC/HDLC SERPL: 0.12 {RATIO}
MAGNESIUM SERPL-MCNC: 2 MG/DL (ref 1.6–2.4)
POTASSIUM SERPL-SCNC: 4.4 MMOL/L (ref 3.5–5.2)
PROT SERPL-MCNC: 6.8 G/DL (ref 6–8.5)
SODIUM SERPL-SCNC: 139 MMOL/L (ref 136–145)
T4 FREE SERPL-MCNC: 1.68 NG/DL (ref 0.93–1.7)
TRIGL SERPL-MCNC: 399 MG/DL (ref 0–150)
TSH SERPL DL<=0.05 MIU/L-ACNC: 0.07 UIU/ML (ref 0.27–4.2)
VLDLC SERPL-MCNC: 56 MG/DL (ref 5–40)

## 2023-01-14 RX ORDER — LEVOTHYROXINE SODIUM 0.07 MG/1
75 TABLET ORAL
Qty: 90 TABLET | Refills: 3 | Status: SHIPPED | OUTPATIENT
Start: 2023-01-14

## 2023-02-08 ENCOUNTER — HOSPITAL ENCOUNTER (OUTPATIENT)
Dept: CARDIOLOGY | Facility: HOSPITAL | Age: 83
Discharge: HOME | End: 2023-02-08
Payer: MEDICARE

## 2023-02-08 ENCOUNTER — HOSPITAL ENCOUNTER (OUTPATIENT)
Dept: MRI IMAGING | Facility: HOSPITAL | Age: 83
Discharge: HOME | End: 2023-02-08
Payer: MEDICARE

## 2023-02-08 DIAGNOSIS — R42 DIZZINESS: ICD-10-CM

## 2023-02-08 DIAGNOSIS — Z95.3 H/O AORTIC VALVE REPLACEMENT WITH PORCINE VALVE: ICD-10-CM

## 2023-02-08 DIAGNOSIS — R20.0 RIGHT FACIAL NUMBNESS: ICD-10-CM

## 2023-02-08 DIAGNOSIS — R55 NEAR SYNCOPE: ICD-10-CM

## 2023-02-08 DIAGNOSIS — I63.81 LACUNAR INFARCTION: Primary | ICD-10-CM

## 2023-02-08 LAB
BH CV XLRA MEAS LEFT CAROTID BULB EDV: 14 CM/SEC
BH CV XLRA MEAS LEFT CAROTID BULB PSV: 60 CM/SEC
BH CV XLRA MEAS LEFT DIST CCA EDV: 14 CM/SEC
BH CV XLRA MEAS LEFT DIST CCA PSV: 64 CM/SEC
BH CV XLRA MEAS LEFT DIST ICA EDV: 18 CM/SEC
BH CV XLRA MEAS LEFT DIST ICA PSV: 62 CM/SEC
BH CV XLRA MEAS LEFT MID ICA EDV: 32 CM/SEC
BH CV XLRA MEAS LEFT MID ICA PSV: 104 CM/SEC
BH CV XLRA MEAS LEFT PROX CCA EDV: 13 CM/SEC
BH CV XLRA MEAS LEFT PROX CCA PSV: 54 CM/SEC
BH CV XLRA MEAS LEFT PROX ECA EDV: 11 CM/SEC
BH CV XLRA MEAS LEFT PROX ECA PSV: 62 CM/SEC
BH CV XLRA MEAS LEFT PROX ICA EDV: 23 CM/SEC
BH CV XLRA MEAS LEFT PROX ICA PSV: 74 CM/SEC
BH CV XLRA MEAS LEFT VERTEBRAL A EDV: 19 CM/SEC
BH CV XLRA MEAS LEFT VERTEBRAL A PSV: 55 CM/SEC
BH CV XLRA MEAS RIGHT CAROTID BULB EDV: 16 CM/SEC
BH CV XLRA MEAS RIGHT CAROTID BULB PSV: 60 CM/SEC
BH CV XLRA MEAS RIGHT DIST CCA EDV: 17 CM/SEC
BH CV XLRA MEAS RIGHT DIST CCA PSV: 57 CM/SEC
BH CV XLRA MEAS RIGHT DIST ICA EDV: 29 CM/SEC
BH CV XLRA MEAS RIGHT DIST ICA PSV: 85 CM/SEC
BH CV XLRA MEAS RIGHT MID ICA EDV: 25 CM/SEC
BH CV XLRA MEAS RIGHT MID ICA PSV: 76 CM/SEC
BH CV XLRA MEAS RIGHT PROX CCA EDV: 17 CM/SEC
BH CV XLRA MEAS RIGHT PROX CCA PSV: 69 CM/SEC
BH CV XLRA MEAS RIGHT PROX ECA EDV: 10 CM/SEC
BH CV XLRA MEAS RIGHT PROX ECA PSV: 56 CM/SEC
BH CV XLRA MEAS RIGHT PROX ICA EDV: 20 CM/SEC
BH CV XLRA MEAS RIGHT PROX ICA PSV: 73 CM/SEC
BH CV XLRA MEAS RIGHT VERTEBRAL A EDV: 15 CM/SEC
BH CV XLRA MEAS RIGHT VERTEBRAL A PSV: 75 CM/SEC
LEFT ARM BP: NORMAL MMHG
MAXIMAL PREDICTED HEART RATE: 136 BPM
RIGHT ARM BP: NORMAL MMHG
STRESS TARGET HR: 116 BPM

## 2023-02-08 PROCEDURE — 93880 EXTRACRANIAL BILAT STUDY: CPT

## 2023-02-08 PROCEDURE — 70551 MRI BRAIN STEM W/O DYE: CPT

## 2023-02-08 PROCEDURE — 93880 EXTRACRANIAL BILAT STUDY: CPT | Performed by: SURGERY

## 2023-02-10 ENCOUNTER — LAB (OUTPATIENT)
Dept: LAB | Facility: HOSPITAL | Age: 83
End: 2023-02-10
Payer: MEDICARE

## 2023-02-10 ENCOUNTER — OFFICE VISIT (OUTPATIENT)
Dept: NEUROLOGY | Facility: CLINIC | Age: 83
End: 2023-02-10
Payer: MEDICARE

## 2023-02-10 VITALS
HEART RATE: 61 BPM | BODY MASS INDEX: 26.55 KG/M2 | DIASTOLIC BLOOD PRESSURE: 49 MMHG | WEIGHT: 165.2 LBS | HEIGHT: 66 IN | SYSTOLIC BLOOD PRESSURE: 166 MMHG | OXYGEN SATURATION: 99 %

## 2023-02-10 DIAGNOSIS — G45.9 TIA (TRANSIENT ISCHEMIC ATTACK): ICD-10-CM

## 2023-02-10 DIAGNOSIS — G45.9 TIA (TRANSIENT ISCHEMIC ATTACK): Primary | ICD-10-CM

## 2023-02-10 LAB
ANION GAP SERPL CALCULATED.3IONS-SCNC: 8.1 MMOL/L (ref 5–15)
BUN SERPL-MCNC: 18 MG/DL (ref 8–23)
BUN/CREAT SERPL: 18.8 (ref 7–25)
CALCIUM SPEC-SCNC: 9.4 MG/DL (ref 8.6–10.5)
CHLORIDE SERPL-SCNC: 103 MMOL/L (ref 98–107)
CHOLEST SERPL-MCNC: 113 MG/DL (ref 0–200)
CO2 SERPL-SCNC: 26.9 MMOL/L (ref 22–29)
CREAT SERPL-MCNC: 0.96 MG/DL (ref 0.57–1)
EGFRCR SERPLBLD CKD-EPI 2021: 58.5 ML/MIN/1.73
GLUCOSE SERPL-MCNC: 140 MG/DL (ref 65–99)
HDLC SERPL-MCNC: 30 MG/DL (ref 40–60)
LDLC SERPL CALC-MCNC: 41 MG/DL (ref 0–100)
LDLC/HDLC SERPL: 0.94 {RATIO}
POTASSIUM SERPL-SCNC: 4.3 MMOL/L (ref 3.5–5.2)
SODIUM SERPL-SCNC: 138 MMOL/L (ref 136–145)
TRIGL SERPL-MCNC: 274 MG/DL (ref 0–150)
VLDLC SERPL-MCNC: 42 MG/DL (ref 5–40)

## 2023-02-10 PROCEDURE — 36415 COLL VENOUS BLD VENIPUNCTURE: CPT

## 2023-02-10 PROCEDURE — 80048 BASIC METABOLIC PNL TOTAL CA: CPT

## 2023-02-10 PROCEDURE — 80061 LIPID PANEL: CPT

## 2023-02-10 PROCEDURE — 99205 OFFICE O/P NEW HI 60 MIN: CPT | Performed by: PSYCHIATRY & NEUROLOGY

## 2023-02-10 NOTE — PROGRESS NOTES
This consult is the request of Dr. Jewel Chavez MD.  Thank you for involving me in the care of this patient.        As you well know this a very pleasant 82-year-old female who presents today after having a couple episodes of vertigo.  She states that she has had several episodes where she felt like she was going to pass out and she felt like the room was spinning.  She also reported with 1 of these events she developed numbness to the right side of her face which lasted for about 15 minutes then stopped.  Her primary care doctor had ordered an MRI of the brain and carotid duplex.  She is currently on aspirin 81 mg.  She states that since these events she has felt slightly lethargic and sluggish.  She did not lose consciousness with any of these events.        Past medical history    Hypertension    Hypothyroidism    Hyperlipidemia    Diabetes    GERD    Status post aortic valve replacement        Family history    Noncontributory        Social history    No alcohol tobacco or illicit drug use reported        On examination today she is alert and oriented x3.  Her speech is fluent there is no dysarthria no aphasia.  Cranial nerves II through XII are intact.  She has 5 out of 5 strength in bilateral upper and lower extremities with normal tone and bulk.  Her reflexes were 2+ throughout there were no upper motor neuron signs.  No ataxia finger-nose or heel-to-shin.  She can stand without assistance and walk 25 feet and 4 seconds.        I reviewed her MRI of the brain which shows no acute pathology.  She has several small chronic lacunar infarcts but no acute infarcts.  She has some ischemic gliotic change and there is some microhemorrhage versus amyloid.  Her carotid duplex showed no major surgical lesion.        In summary this a very pleasant 82-year-old female has had several episodes of vertigo and 1 episode of right-sided facial numbness I think these were most likely TIA she has a lot of risk factors for  stroke.  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  She will continue aspirin and statin therapy for now.  We may consider changing this after testing.  I am also going obtain a routine EEG.  She will follow-up with me at the conclusion of testing or sooner if there is any problems in the interim.  Of course if she were to develop any acute focal neurologic deficit she should go straight to the emergency room.      Diagnoses and all orders for this visit:    1. TIA (transient ischemic attack) (Primary)  -     Lipid Panel; Future  -     Basic Metabolic Panel; Future  -     CT Angiogram Head; Future  -     CT Angiogram Carotids; Future  -     EEG (Hospital Performed); Future  -     Adult Transthoracic Echo Complete W/ Cont if Necessary Per Protocol; Future      I spent 1 hour in patient care.

## 2023-02-21 ENCOUNTER — OFFICE VISIT (OUTPATIENT)
Dept: FAMILY MEDICINE CLINIC | Age: 83
End: 2023-02-21
Payer: MEDICARE

## 2023-02-21 VITALS
HEIGHT: 66 IN | HEART RATE: 64 BPM | WEIGHT: 164 LBS | SYSTOLIC BLOOD PRESSURE: 142 MMHG | BODY MASS INDEX: 26.36 KG/M2 | TEMPERATURE: 97.7 F | DIASTOLIC BLOOD PRESSURE: 48 MMHG

## 2023-02-21 DIAGNOSIS — I10 ESSENTIAL HYPERTENSION: ICD-10-CM

## 2023-02-21 DIAGNOSIS — K21.9 GERD WITHOUT ESOPHAGITIS: ICD-10-CM

## 2023-02-21 DIAGNOSIS — E11.9 TYPE 2 DIABETES MELLITUS WITHOUT COMPLICATION, WITHOUT LONG-TERM CURRENT USE OF INSULIN: Primary | ICD-10-CM

## 2023-02-21 DIAGNOSIS — E78.2 MIXED HYPERLIPIDEMIA: ICD-10-CM

## 2023-02-21 LAB — ALBUMIN UR-MCNC: 1.2 MG/DL

## 2023-02-21 PROCEDURE — 82043 UR ALBUMIN QUANTITATIVE: CPT | Performed by: FAMILY MEDICINE

## 2023-02-21 PROCEDURE — 99214 OFFICE O/P EST MOD 30 MIN: CPT | Performed by: FAMILY MEDICINE

## 2023-02-21 RX ORDER — ROSUVASTATIN CALCIUM 5 MG/1
5 TABLET, COATED ORAL DAILY
Qty: 90 TABLET | Refills: 3 | Status: SHIPPED | OUTPATIENT
Start: 2023-02-21

## 2023-02-21 RX ORDER — PANTOPRAZOLE SODIUM 40 MG/1
40 TABLET, DELAYED RELEASE ORAL DAILY
Qty: 90 TABLET | Refills: 3 | Status: SHIPPED | OUTPATIENT
Start: 2023-02-21

## 2023-02-21 RX ORDER — LISINOPRIL 20 MG/1
20 TABLET ORAL DAILY
Qty: 90 TABLET | Refills: 3 | Status: SHIPPED | OUTPATIENT
Start: 2023-02-21

## 2023-02-21 RX ORDER — METFORMIN HYDROCHLORIDE 500 MG/1
1000 TABLET, EXTENDED RELEASE ORAL
Qty: 180 TABLET | Refills: 3 | Status: SHIPPED | OUTPATIENT
Start: 2023-02-21

## 2023-02-21 NOTE — PROGRESS NOTES
"Zoila Whipple presents to [facility].    Chief Complaint:  Diabetes, blood pressure, cholesterol, thyroid  Subjective       History of Present Illness:  Zoila Dixon is in today for follow up on her care.  She has type 2 diabetes for which she remains on metformin as noted below.  She says her blood sugar will be around 100 when she checks it.  Her most recent A1c was in a good range at 6.5%.    She also has hypertension and elevated cholesterol.  She is having some ongoing discussion with cardiology about her blood pressure and will be doing some additional checks on it.  She is using a wrist cuff at home, and she may want to bring it back for comparison with ours.  Her blood pressure was 180 systolic at home this morning when she checked it.  Her lipid profile was good overall when it was checked recently.    Regarding hypothyroidism, her TSH was low, and we decreased the dose of levothyroxine as a result.    Review of Systems:  Review of Systems   Constitutional: Negative for chills and fever.   Respiratory: Negative for cough and shortness of breath.    Cardiovascular: Negative for chest pain and palpitations.   Gastrointestinal: Negative for abdominal pain, nausea and vomiting.        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   • Mixed hyperlipidemia   • Type 2 diabetes mellitus (HCC)     Past Surgical History:   • AORTIC VALVE REPAIR/REPLACEMENT   • APPENDECTOMY   • HYSTERECTOMY   • INGUINAL HERNIA REPAIR   • LAMINECTOMY      Family History   Problem Relation Age of Onset   • No Known Problems Mother         Mother  94 years old \"old age\"   • Heart attack Father         Father  49 years old MI     Social History     Tobacco Use   • Smoking status: Never   • Smokeless tobacco: Never   Substance Use Topics   • Alcohol use: Never "       Health Maintenance Due   Topic Date Due   • TDAP/TD VACCINES (1 - Tdap) Never done   • ZOSTER VACCINE (1 of 2) Never done   • COVID-19 Vaccine (5 - Booster for Moderna series) 08/16/2022   • DIABETIC EYE EXAM  09/14/2022   • URINE MICROALBUMIN  01/24/2023        Immunization History   Administered Date(s) Administered   • COVID-19 (MODERNA) 1st, 2nd, 3rd Dose Only 02/12/2021, 03/12/2021   • COVID-19 (PFIZER) PURPLE CAP 12/03/2021   • Covid-19 (Pfizer) Gray Cap 06/21/2022   • Fluzone High Dose =>65 Years (Vaxcare ONLY) 09/21/2017   • Fluzone High-Dose 65+yrs 09/29/2021, 10/20/2022   • Influenza Quad Vaccine (Inpatient) 10/08/2013   • Pneumococcal Polysaccharide (PPSV23) 09/21/2017       Allergies   Allergen Reactions   • Medrol [Methylprednisolone] Rash   • Niacin Rash   • Sulfamethoxazole-Trimethoprim Rash        Medications:  Current Outpatient Medications on File Prior to Visit   Medication Sig   • ascorbic acid (VITAMIN C) 250 MG tablet Take 250 mg by mouth Daily.   • aspirin 81 MG EC tablet Take 81 mg by mouth Daily.   • cephalexin (KEFLEX) 250 MG capsule Take 250 mg by mouth Daily.   • cholecalciferol (VITAMIN D3) 25 MCG (1000 UT) tablet Take 1,000 Units by mouth Daily.   • levothyroxine (Synthroid) 75 MCG tablet Take 1 tablet by mouth Every Morning.   • metoprolol tartrate (LOPRESSOR) 25 MG tablet Take 25 mg by mouth 2 (Two) Times a Day.   • Omega-3 Fatty Acids (fish oil) 1000 MG capsule capsule Take 1 capsule by mouth Daily.   • potassium chloride 10 MEQ CR tablet Take 10 mEq by mouth Daily.   • torsemide (DEMADEX) 20 MG tablet Take 20 mg by mouth Daily.   • vitamin E 1000 UNIT capsule Take 1,000 Units by mouth Daily.   • [DISCONTINUED] lisinopril (PRINIVIL,ZESTRIL) 20 MG tablet Take 1 tablet by mouth Daily.   • [DISCONTINUED] metFORMIN ER (GLUCOPHAGE-XR) 500 MG 24 hr tablet Take 2 tablets by mouth Daily With Dinner.   • [DISCONTINUED] pantoprazole (PROTONIX) 40 MG EC tablet Take 1 tablet by mouth  "Daily.   • [DISCONTINUED] rosuvastatin (CRESTOR) 5 MG tablet Take 1 tablet by mouth Daily.     No current facility-administered medications on file prior to visit.       Vital Signs:   Vitals:    02/21/23 1112 02/21/23 1156   BP: 147/66 142/48   BP Location: Left arm Left arm   Patient Position: Sitting Sitting   Pulse: 62 64   Temp: 97.7 °F (36.5 °C)    TempSrc: Oral    Weight: 74.4 kg (164 lb)    Height: 166.4 cm (65.51\")    Body mass index is 26.87 kg/m².    Physical Exam:  Physical Exam  Vitals reviewed.   Constitutional:       General: She is not in acute distress.     Appearance: She is not ill-appearing.   Eyes:      Pupils: Pupils are equal, round, and reactive to light.   Neck:      Comments: No thyromegaly  Cardiovascular:      Rate and Rhythm: Normal rate and regular rhythm.      Heart sounds: Murmur heard.   Pulmonary:      Effort: Pulmonary effort is normal.      Breath sounds: Normal breath sounds.   Abdominal:      General: There is no distension.      Palpations: Abdomen is soft.      Tenderness: There is no abdominal tenderness.   Musculoskeletal:      Cervical back: Neck supple.   Lymphadenopathy:      Cervical: No cervical adenopathy.   Skin:     Findings: No lesion or rash.   Neurological:      Mental Status: She is alert.         Result Review      The following data was reviewed by Jewel Chavez MD on 02/21/2023.  Lab Results   Component Value Date    WBC 5.32 01/13/2023    HGB 11.6 (L) 01/13/2023    HCT 36.1 01/13/2023    MCV 82.4 01/13/2023     01/13/2023     Lab Results   Component Value Date    GLUCOSE 140 (H) 02/10/2023    BUN 18 02/10/2023    CREATININE 0.96 02/10/2023     02/10/2023    K 4.3 02/10/2023     02/10/2023    CO2 26.9 02/10/2023    CALCIUM 9.4 02/10/2023    PROTEINTOT 6.8 01/13/2023    ALBUMIN 4.3 01/13/2023    ALT 14 01/13/2023    AST 22 01/13/2023    ALKPHOS 101 01/13/2023    BILITOT 0.2 01/13/2023    EGFR 58.5 (L) 02/10/2023    GLOB 2.5 " 01/13/2023    AGRATIO 1.7 01/13/2023    BCR 18.8 02/10/2023    ANIONGAP 8.1 02/10/2023      Lab Results   Component Value Date    CHOL 113 02/10/2023    CHLPL 119 03/10/2021    TRIG 274 (H) 02/10/2023    HDL 30 (L) 02/10/2023    LDL 41 02/10/2023     Lab Results   Component Value Date    TSH 0.073 (L) 01/13/2023     Lab Results   Component Value Date    HGBA1C 6.50 (H) 01/13/2023            Assessment and Plan:   Today, we have reviewed her care.  Zoila Dixon seems to be doing well.  She is in the midst of monitoring her blood pressure.  She could certainly bring her cuff by the allergy room anytime to check that for accuracy.  We will refill needed medications for now and tentatively plan to see her back in about 6 months for wellness.  Otherwise, no short-term changes anticipated.       Diagnoses and all orders for this visit:    1. Type 2 diabetes mellitus without complication, without long-term current use of insulin (HCC) (Primary)  -     metFORMIN ER (GLUCOPHAGE-XR) 500 MG 24 hr tablet; Take 2 tablets by mouth Daily With Dinner.  Dispense: 180 tablet; Refill: 3  -     MicroAlbumin, Urine, Random - Urine, Clean Catch    2. Essential hypertension  -     lisinopril (PRINIVIL,ZESTRIL) 20 MG tablet; Take 1 tablet by mouth Daily.  Dispense: 90 tablet; Refill: 3    3. GERD without esophagitis  -     pantoprazole (PROTONIX) 40 MG EC tablet; Take 1 tablet by mouth Daily.  Dispense: 90 tablet; Refill: 3    4. Mixed hyperlipidemia  -     rosuvastatin (CRESTOR) 5 MG tablet; Take 1 tablet by mouth Daily.  Dispense: 90 tablet; Refill: 3    Follow Up   Return in about 6 months (around 8/21/2023) for Recheck, Medicare Wellness.  Patient was given instructions and counseling regarding her condition or for health maintenance advice. Please see specific information pulled into the AVS if appropriate.

## 2023-02-22 ENCOUNTER — CLINICAL SUPPORT (OUTPATIENT)
Dept: FAMILY MEDICINE CLINIC | Age: 83
End: 2023-02-22
Payer: MEDICARE

## 2023-02-22 VITALS — DIASTOLIC BLOOD PRESSURE: 57 MMHG | SYSTOLIC BLOOD PRESSURE: 107 MMHG | HEART RATE: 68 BPM

## 2023-02-22 NOTE — PROGRESS NOTES
Vitals:    02/22/23 1404 02/22/23 1405   BP: 138/58  Comment: HOME WRIST MACHINE 107/57  Comment: OUR MACHINE   BP Location: Left arm Left arm   Patient Position: Sitting Sitting   Pulse: 68 68     Good evening, Zoila Dixon.  Thank you for stopping by with your home blood pressure monitor today.  It looks like the wrist cuff may be measuring a bit high if anything.  There was about a 30 point difference for the systolic blood pressure (top number) between our machine and yours.  Generally, I do not recommend wrist cuffs.  Because we do not measure blood pressure in that fashion, I do not have as much radha in their accuracy.  If you wanted to get a cuff for home monitoring, then I would recommend an upper arm cuff which is easy to use such as the Omron Series 7.  Let me know if you have other concerns.    Jewel Chavez MD  [facility]    PS - [facility] messages are not reviewed on an urgent basis.  It may be several days before we review and/or respond to your message.  If you have an urgent need, please call the office at [phone].

## 2023-03-13 ENCOUNTER — TELEPHONE (OUTPATIENT)
Dept: FAMILY MEDICINE CLINIC | Age: 83
End: 2023-03-13
Payer: MEDICARE

## 2023-03-13 NOTE — TELEPHONE ENCOUNTER
----- Message from Whitney Obregon LPN sent at 1/16/2023  9:31 AM EST -----   TICKLE ordered labs in 8 weeks

## 2023-03-14 ENCOUNTER — HOSPITAL ENCOUNTER (OUTPATIENT)
Dept: CT IMAGING | Facility: HOSPITAL | Age: 83
Discharge: HOME | End: 2023-03-14
Payer: MEDICARE

## 2023-03-14 ENCOUNTER — HOSPITAL ENCOUNTER (OUTPATIENT)
Dept: NEUROLOGY | Facility: HOSPITAL | Age: 83
Discharge: HOME | End: 2023-03-14
Payer: MEDICARE

## 2023-03-14 DIAGNOSIS — G45.9 TIA (TRANSIENT ISCHEMIC ATTACK): ICD-10-CM

## 2023-03-14 LAB
CREAT BLDA-MCNC: 1 MG/DL
EGFRCR SERPLBLD CKD-EPI 2021: 55.7 ML/MIN/1.73

## 2023-03-14 PROCEDURE — 25510000001 IOPAMIDOL PER 1 ML: Performed by: PSYCHIATRY & NEUROLOGY

## 2023-03-14 PROCEDURE — 70496 CT ANGIOGRAPHY HEAD: CPT

## 2023-03-14 PROCEDURE — 70498 CT ANGIOGRAPHY NECK: CPT

## 2023-03-14 PROCEDURE — 82565 ASSAY OF CREATININE: CPT

## 2023-03-14 PROCEDURE — 95816 EEG AWAKE AND DROWSY: CPT

## 2023-03-14 RX ADMIN — IOPAMIDOL 100 ML: 755 INJECTION, SOLUTION INTRAVENOUS at 15:15

## 2023-03-15 ENCOUNTER — LAB (OUTPATIENT)
Dept: LAB | Facility: HOSPITAL | Age: 83
End: 2023-03-15
Payer: MEDICARE

## 2023-03-15 DIAGNOSIS — E03.9 ACQUIRED HYPOTHYROIDISM: ICD-10-CM

## 2023-03-15 DIAGNOSIS — I10 ESSENTIAL HYPERTENSION: ICD-10-CM

## 2023-03-15 DIAGNOSIS — D64.9 ANEMIA, UNSPECIFIED TYPE: ICD-10-CM

## 2023-03-15 LAB
BACTERIA UR QL AUTO: ABNORMAL /HPF
BILIRUB UR QL STRIP: NEGATIVE
CLARITY UR: CLEAR
COLOR UR: YELLOW
DEPRECATED RDW RBC AUTO: 43.1 FL (ref 37–54)
ERYTHROCYTE [DISTWIDTH] IN BLOOD BY AUTOMATED COUNT: 14.2 % (ref 12.3–15.4)
GLUCOSE UR STRIP-MCNC: NEGATIVE MG/DL
HCT VFR BLD AUTO: 35.2 % (ref 34–46.6)
HGB BLD-MCNC: 11.5 G/DL (ref 12–15.9)
HGB UR QL STRIP.AUTO: ABNORMAL
KETONES UR QL STRIP: NEGATIVE
LEUKOCYTE ESTERASE UR QL STRIP.AUTO: ABNORMAL
MCH RBC QN AUTO: 27.4 PG (ref 26.6–33)
MCHC RBC AUTO-ENTMCNC: 32.7 G/DL (ref 31.5–35.7)
MCV RBC AUTO: 83.8 FL (ref 79–97)
NITRITE UR QL STRIP: NEGATIVE
PH UR STRIP.AUTO: 5.5 [PH] (ref 5–8)
PLATELET # BLD AUTO: 169 10*3/MM3 (ref 140–450)
PMV BLD AUTO: 11.5 FL (ref 6–12)
PROT UR QL STRIP: NEGATIVE
RBC # BLD AUTO: 4.2 10*6/MM3 (ref 3.77–5.28)
RBC # UR STRIP: ABNORMAL /HPF
REF LAB TEST METHOD: ABNORMAL
SP GR UR STRIP: 1.01 (ref 1–1.03)
SQUAMOUS #/AREA URNS HPF: ABNORMAL /HPF
T4 FREE SERPL-MCNC: 1.22 NG/DL (ref 0.93–1.7)
TSH SERPL DL<=0.05 MIU/L-ACNC: 2.2 UIU/ML (ref 0.27–4.2)
UROBILINOGEN UR QL STRIP: ABNORMAL
WBC # UR STRIP: ABNORMAL /HPF
WBC NRBC COR # BLD: 5.73 10*3/MM3 (ref 3.4–10.8)

## 2023-03-15 PROCEDURE — 36415 COLL VENOUS BLD VENIPUNCTURE: CPT

## 2023-03-15 PROCEDURE — 81001 URINALYSIS AUTO W/SCOPE: CPT

## 2023-03-15 PROCEDURE — 84439 ASSAY OF FREE THYROXINE: CPT

## 2023-03-15 PROCEDURE — 84443 ASSAY THYROID STIM HORMONE: CPT

## 2023-03-15 PROCEDURE — 85027 COMPLETE CBC AUTOMATED: CPT

## 2023-03-17 ENCOUNTER — OFFICE VISIT (OUTPATIENT)
Dept: NEUROLOGY | Facility: CLINIC | Age: 83
End: 2023-03-17
Payer: MEDICARE

## 2023-03-17 VITALS
BODY MASS INDEX: 26.48 KG/M2 | HEIGHT: 66 IN | OXYGEN SATURATION: 100 % | HEART RATE: 56 BPM | WEIGHT: 164.8 LBS | SYSTOLIC BLOOD PRESSURE: 143 MMHG | DIASTOLIC BLOOD PRESSURE: 45 MMHG

## 2023-03-17 DIAGNOSIS — G45.9 TIA (TRANSIENT ISCHEMIC ATTACK): Primary | ICD-10-CM

## 2023-03-17 PROCEDURE — 3078F DIAST BP <80 MM HG: CPT | Performed by: PSYCHIATRY & NEUROLOGY

## 2023-03-17 PROCEDURE — 3077F SYST BP >= 140 MM HG: CPT | Performed by: PSYCHIATRY & NEUROLOGY

## 2023-03-17 PROCEDURE — 99214 OFFICE O/P EST MOD 30 MIN: CPT | Performed by: PSYCHIATRY & NEUROLOGY

## 2023-03-17 PROCEDURE — 1160F RVW MEDS BY RX/DR IN RCRD: CPT | Performed by: PSYCHIATRY & NEUROLOGY

## 2023-03-17 PROCEDURE — 1159F MED LIST DOCD IN RCRD: CPT | Performed by: PSYCHIATRY & NEUROLOGY

## 2023-03-17 RX ORDER — CLOPIDOGREL BISULFATE 75 MG/1
75 TABLET ORAL DAILY
Qty: 30 TABLET | Refills: 11 | Status: SHIPPED | OUTPATIENT
Start: 2023-03-17 | End: 2024-03-16

## 2023-03-17 NOTE — PROGRESS NOTES
This is a very pleasant 82-year-old female.  Who I am seeing today in follow-up after a TIA.  She does report some new neurologic symptoms where she felt pressure on her head and a strange sensation to her face.  This was not as severe as the initial event that she had.  She feels like there is pressure behind her eyes.        She is currently on aspirin 81 mg for secondary stroke prophylaxis she is also on blood pressure control and Crestor.  She is here today to review testing.        On examination today she is alert and oriented x3.  Her speech is fluent there is no dysarthria no aphasia.  Cranial nerves II through XII are intact.  She had 5 out of 5 strength throughout there is no ataxia finger-nose.        I reviewed her CTA of the head neck which showed no major vascular occlusion.  Her echo was done at an outside facility I was not able to review this I will try to get these records from my nurse.  I was able to review her Holter monitor which did show one episode of tachycardia the cardiologist was not sure if this was A-fib or not given there was a lot of motion artifact.  She had seen her cardiologist that he continued antiplatelet medication.  I reviewed her EEG which was normal.        In summary this a very pleasant 82-year-old female whose had a previous TIA.  She has not had obvious A-fib although I am going to switch her aspirin to Plavix 75 mg.  She will continue Crestor and good blood pressure control she will continue to follow-up with cardiology and her primary care doctor.  If she were ever to have obvious A-fib that we would need to switch her to an anticoagulant.  She will follow-up with me in 1 year or sooner if there is any problems in the interim.  Of course if she would have any acute focal neurologic deficit she should go straight to the emergency room.      Diagnoses and all orders for this visit:    1. TIA (transient ischemic attack) (Primary)    Other orders  -     clopidogrel  (Plavix) 75 MG tablet; Take 1 tablet by mouth Daily.  Dispense: 30 tablet; Refill: 11      I spent 30 minutes in patient care.

## 2023-04-12 DIAGNOSIS — K21.9 GERD WITHOUT ESOPHAGITIS: ICD-10-CM

## 2023-04-12 RX ORDER — PANTOPRAZOLE SODIUM 40 MG/1
TABLET, DELAYED RELEASE ORAL
Qty: 90 TABLET | Refills: 1 | OUTPATIENT
Start: 2023-04-12

## 2023-04-18 ENCOUNTER — TRANSCRIBE ORDERS (OUTPATIENT)
Dept: ADMINISTRATIVE | Facility: HOSPITAL | Age: 83
End: 2023-04-18
Payer: MEDICARE

## 2023-04-18 ENCOUNTER — LAB (OUTPATIENT)
Dept: LAB | Facility: HOSPITAL | Age: 83
End: 2023-04-18
Payer: MEDICARE

## 2023-04-18 DIAGNOSIS — M79.609 PAIN IN EXTREMITY, UNSPECIFIED EXTREMITY: Primary | ICD-10-CM

## 2023-04-18 DIAGNOSIS — M79.609 PAIN IN EXTREMITY, UNSPECIFIED EXTREMITY: ICD-10-CM

## 2023-04-18 DIAGNOSIS — R26.2 DIFFICULTY WALKING: ICD-10-CM

## 2023-04-18 LAB
BASOPHILS # BLD AUTO: 0.01 10*3/MM3 (ref 0–0.2)
BASOPHILS NFR BLD AUTO: 0.2 % (ref 0–1.5)
DEPRECATED RDW RBC AUTO: 46.6 FL (ref 37–54)
EOSINOPHIL # BLD AUTO: 0.05 10*3/MM3 (ref 0–0.4)
EOSINOPHIL NFR BLD AUTO: 1 % (ref 0.3–6.2)
ERYTHROCYTE [DISTWIDTH] IN BLOOD BY AUTOMATED COUNT: 14.7 % (ref 12.3–15.4)
HCT VFR BLD AUTO: 32.1 % (ref 34–46.6)
HGB BLD-MCNC: 10.3 G/DL (ref 12–15.9)
IMM GRANULOCYTES # BLD AUTO: 0.01 10*3/MM3 (ref 0–0.05)
IMM GRANULOCYTES NFR BLD AUTO: 0.2 % (ref 0–0.5)
LYMPHOCYTES # BLD AUTO: 1.3 10*3/MM3 (ref 0.7–3.1)
LYMPHOCYTES NFR BLD AUTO: 25.6 % (ref 19.6–45.3)
MCH RBC QN AUTO: 27.5 PG (ref 26.6–33)
MCHC RBC AUTO-ENTMCNC: 32.1 G/DL (ref 31.5–35.7)
MCV RBC AUTO: 85.6 FL (ref 79–97)
MONOCYTES # BLD AUTO: 0.6 10*3/MM3 (ref 0.1–0.9)
MONOCYTES NFR BLD AUTO: 11.8 % (ref 5–12)
NEUTROPHILS NFR BLD AUTO: 3.1 10*3/MM3 (ref 1.7–7)
NEUTROPHILS NFR BLD AUTO: 61.2 % (ref 42.7–76)
PLATELET # BLD AUTO: 153 10*3/MM3 (ref 140–450)
PMV BLD AUTO: 10.7 FL (ref 6–12)
RBC # BLD AUTO: 3.75 10*6/MM3 (ref 3.77–5.28)
WBC NRBC COR # BLD: 5.07 10*3/MM3 (ref 3.4–10.8)

## 2023-04-18 PROCEDURE — 85025 COMPLETE CBC W/AUTO DIFF WBC: CPT

## 2023-04-18 PROCEDURE — 36415 COLL VENOUS BLD VENIPUNCTURE: CPT

## 2023-04-18 PROCEDURE — 84550 ASSAY OF BLOOD/URIC ACID: CPT

## 2023-04-19 LAB — URATE SERPL-MCNC: 8.6 MG/DL (ref 2.4–5.7)

## 2023-05-16 ENCOUNTER — OFFICE VISIT (OUTPATIENT)
Dept: FAMILY MEDICINE CLINIC | Age: 83
End: 2023-05-16
Payer: MEDICARE

## 2023-05-16 VITALS
HEART RATE: 70 BPM | BODY MASS INDEX: 26.33 KG/M2 | WEIGHT: 163.8 LBS | HEIGHT: 66 IN | TEMPERATURE: 97 F | SYSTOLIC BLOOD PRESSURE: 173 MMHG | DIASTOLIC BLOOD PRESSURE: 56 MMHG

## 2023-05-16 DIAGNOSIS — M1A.0710 CHRONIC IDIOPATHIC GOUT INVOLVING TOE OF RIGHT FOOT WITHOUT TOPHUS: Primary | ICD-10-CM

## 2023-05-16 DIAGNOSIS — E11.9 TYPE 2 DIABETES MELLITUS WITHOUT COMPLICATION, WITHOUT LONG-TERM CURRENT USE OF INSULIN: ICD-10-CM

## 2023-05-16 LAB
EXPIRATION DATE: NORMAL
HBA1C MFR BLD: 6.6 %
Lab: NORMAL

## 2023-05-16 RX ORDER — METHYLPREDNISOLONE 4 MG/1
4 TABLET ORAL DAILY
COMMUNITY

## 2023-05-16 RX ORDER — MULTIPLE VITAMINS W/ MINERALS TAB 9MG-400MCG
1 TAB ORAL DAILY
COMMUNITY

## 2023-05-16 RX ORDER — ALLOPURINOL 100 MG/1
100 TABLET ORAL DAILY
COMMUNITY

## 2023-05-16 NOTE — PROGRESS NOTES
"Zoila Whipple presents to [facility].    Chief Complaint:  Gout, diabetes    Subjective       History of Present Illness:  Zoila Dixon is here today for follow up on her care.  She has struggled with gout over the last month or longer.  She has been seeing podiatry and has been given a steroid pack and allopurinol for this.  I did review her labs as noted below.  Her uric acid level was significantly elevated.  She is asking about some specific dietary information in this regard.    She also has type 2 diabetes.  Her control has been good, but she is due for recheck on A1c.    Review of Systems:  Review of Systems   Constitutional: Negative for chills and fever.   Respiratory: Negative for cough and shortness of breath.    Cardiovascular: Negative for chest pain and palpitations.   Gastrointestinal: Negative for abdominal pain, nausea and vomiting.        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   • Mixed hyperlipidemia   • Type 2 diabetes mellitus     Past Surgical History:   • AORTIC VALVE REPAIR/REPLACEMENT   • APPENDECTOMY   • HYSTERECTOMY   • INGUINAL HERNIA REPAIR   • LAMINECTOMY      Family History   Problem Relation Age of Onset   • No Known Problems Mother         Mother  94 years old \"old age\"   • Heart attack Father         Father  49 years old MI     Social History     Tobacco Use   • Smoking status: Never   • Smokeless tobacco: Never   Substance Use Topics   • Alcohol use: Never       Health Maintenance Due   Topic Date Due   • TDAP/TD VACCINES (1 - Tdap) Never done   • ZOSTER VACCINE (1 of 2) Never done   • COVID-19 Vaccine (5 - Booster for Moderna series) 2022   • DIABETIC EYE EXAM  2022        Immunization History   Administered Date(s) Administered   • COVID-19 (MODERNA) 1st,2nd,3rd Dose Monovalent " 02/12/2021, 03/12/2021   • COVID-19 (PFIZER) Purple Cap Monovalent 12/03/2021   • Covid-19 (Pfizer) Gray Cap Monovalent 06/21/2022   • Fluzone High Dose =>65 Years (Vaxcare ONLY) 09/21/2017   • Fluzone High-Dose 65+yrs 09/29/2021, 10/20/2022   • Influenza Quad Vaccine (Inpatient) 10/08/2013   • Pneumococcal Polysaccharide (PPSV23) 09/21/2017       Allergies   Allergen Reactions   • Medrol [Methylprednisolone] Rash   • Niacin Rash   • Sulfamethoxazole-Trimethoprim Rash        Medications:  Current Outpatient Medications on File Prior to Visit   Medication Sig   • allopurinol (ZYLOPRIM) 100 MG tablet Take 1 tablet by mouth Daily.   • ascorbic acid (VITAMIN C) 250 MG tablet Take 1 tablet by mouth Daily.   • cephalexin (KEFLEX) 250 MG capsule Take 1 capsule by mouth Daily.   • cholecalciferol (VITAMIN D3) 25 MCG (1000 UT) tablet Take 1 tablet by mouth Daily.   • clopidogrel (Plavix) 75 MG tablet Take 1 tablet by mouth Daily.   • levothyroxine (Synthroid) 75 MCG tablet Take 1 tablet by mouth Every Morning.   • lisinopril (PRINIVIL,ZESTRIL) 20 MG tablet Take 1 tablet by mouth Daily.   • metFORMIN ER (GLUCOPHAGE-XR) 500 MG 24 hr tablet Take 2 tablets by mouth Daily With Dinner.   • methylPREDNISolone (MEDROL) 4 MG tablet Take 1 tablet by mouth Daily. dosepak   • metoprolol tartrate (LOPRESSOR) 25 MG tablet Take 1 tablet by mouth 2 (Two) Times a Day.   • multivitamin with minerals (HAIR/SKIN/NAILS PO) Take 1 tablet by mouth Daily.   • Omega-3 Fatty Acids (fish oil) 1000 MG capsule capsule Take 1 capsule by mouth Daily.   • pantoprazole (PROTONIX) 40 MG EC tablet Take 1 tablet by mouth Daily.   • potassium chloride 10 MEQ CR tablet Take 1 tablet by mouth Daily.   • rosuvastatin (CRESTOR) 5 MG tablet Take 1 tablet by mouth Daily.   • torsemide (DEMADEX) 20 MG tablet Take 1 tablet by mouth Daily.   • vitamin E 1000 UNIT capsule Take 1 capsule by mouth Daily.     No current facility-administered medications on file prior to  "visit.       Vital Signs:   Vitals:    05/16/23 1556 05/16/23 1640   BP: 155/67 173/56   BP Location: Right arm Right arm   Patient Position: Sitting Sitting   Pulse: 67 70   Temp: 97 °F (36.1 °C)    TempSrc: Oral    Weight: 74.3 kg (163 lb 12.8 oz)    Height: 166.4 cm (65.51\")    Body mass index is 26.83 kg/m².    Physical Exam:  Physical Exam  Vitals reviewed.   Constitutional:       General: She is not in acute distress.     Appearance: She is not ill-appearing.   Eyes:      Pupils: Pupils are equal, round, and reactive to light.   Neck:      Comments: No thyromegaly  Cardiovascular:      Rate and Rhythm: Normal rate and regular rhythm.      Heart sounds: Murmur heard.   Pulmonary:      Effort: Pulmonary effort is normal.      Breath sounds: Normal breath sounds.   Abdominal:      General: There is no distension.      Palpations: Abdomen is soft.      Tenderness: There is no abdominal tenderness.   Musculoskeletal:      Cervical back: Neck supple.      Right lower leg: Edema (The right foot is wrapped.) present.   Lymphadenopathy:      Cervical: No cervical adenopathy.   Skin:     Findings: No lesion or rash.   Neurological:      Mental Status: She is alert.         Result Review      The following data was reviewed by Jewel Chavez MD on 05/16/2023.  Lab Results   Component Value Date    WBC 5.07 04/18/2023    HGB 10.3 (L) 04/18/2023    HCT 32.1 (L) 04/18/2023    MCV 85.6 04/18/2023     04/18/2023     Lab Results   Component Value Date    GLUCOSE 140 (H) 02/10/2023    BUN 18 02/10/2023    CREATININE 1.00 03/14/2023     02/10/2023    K 4.3 02/10/2023     02/10/2023    CO2 26.9 02/10/2023    CALCIUM 9.4 02/10/2023    PROTEINTOT 6.8 01/13/2023    ALBUMIN 4.3 01/13/2023    ALT 14 01/13/2023    AST 22 01/13/2023    ALKPHOS 101 01/13/2023    BILITOT 0.2 01/13/2023    EGFR 55.7 (L) 03/14/2023    GLOB 2.5 01/13/2023    AGRATIO 1.7 01/13/2023    BCR 18.8 02/10/2023    ANIONGAP 8.1 02/10/2023 "      Lab Results   Component Value Date    CHOL 113 02/10/2023    CHLPL 119 03/10/2021    TRIG 274 (H) 02/10/2023    HDL 30 (L) 02/10/2023    LDL 41 02/10/2023     Lab Results   Component Value Date    TSH 2.200 03/15/2023     Lab Results   Component Value Date    HGBA1C 6.50 (H) 01/13/2023     Lab Results   Component Value Date    URICACID 8.6 (H) 04/18/2023            Assessment and Plan:   Today, we have reviewed her care.  Unfortunately, Zoila Dixon has been struggling with gout as noted above.  Hopefully, the steroid pack she has been given along with allopurinol will help calm things down.  She is given a couple of handouts regarding gout and diet related to it.  There is no specific change in her care or medicines I would recommend other than that.  We will recheck an A1c as well.  I did recommend she keep her scheduled follow-up in September.  We will move forward from there.  Her blood pressure is mildly elevated, and will be rechecked.       Diagnoses and all orders for this visit:    1. Chronic idiopathic gout involving toe of right foot without tophus (Primary)  Comments:  As above.    2. Type 2 diabetes mellitus without complication, without long-term current use of insulin  -     POC Glycosylated Hemoglobin (Hb A1C)    Follow Up   Return in about 4 months (around 9/28/2023) for Recheck as above.  Patient was given instructions and counseling regarding her condition or for health maintenance advice. Please see specific information pulled into the AVS if appropriate.

## 2023-07-24 ENCOUNTER — HOSPITAL ENCOUNTER (OUTPATIENT)
Dept: BONE DENSITY | Facility: HOSPITAL | Age: 83
Discharge: HOME | End: 2023-07-24
Admitting: FAMILY MEDICINE
Payer: MEDICARE

## 2023-07-24 DIAGNOSIS — Z78.0 POSTMENOPAUSAL STATE: ICD-10-CM

## 2023-07-24 PROCEDURE — 77080 DXA BONE DENSITY AXIAL: CPT

## 2023-09-11 ENCOUNTER — TELEPHONE (OUTPATIENT)
Dept: NEUROLOGY | Facility: CLINIC | Age: 83
End: 2023-09-11
Payer: MEDICARE

## 2023-09-11 NOTE — TELEPHONE ENCOUNTER
[facility],    Pt is scheduled to come in at 9:20 to see Dr. Che on Thursday 9/14.     Dr. Che doesn't start seeing pts until 9:40.       Pt returned call and  asked her to come in at 10. Pt advised she would come in at 10 on Thursday.    -clr

## 2023-09-14 ENCOUNTER — OFFICE VISIT (OUTPATIENT)
Dept: NEUROLOGY | Facility: CLINIC | Age: 83
End: 2023-09-14
Payer: MEDICARE

## 2023-09-14 ENCOUNTER — TELEPHONE (OUTPATIENT)
Dept: FAMILY MEDICINE CLINIC | Age: 83
End: 2023-09-14
Payer: MEDICARE

## 2023-09-14 VITALS
BODY MASS INDEX: 26.33 KG/M2 | WEIGHT: 163.8 LBS | SYSTOLIC BLOOD PRESSURE: 145 MMHG | HEART RATE: 66 BPM | DIASTOLIC BLOOD PRESSURE: 48 MMHG | HEIGHT: 66 IN | OXYGEN SATURATION: 97 %

## 2023-09-14 DIAGNOSIS — Z12.31 ENCOUNTER FOR SCREENING MAMMOGRAM FOR MALIGNANT NEOPLASM OF BREAST: Primary | ICD-10-CM

## 2023-09-14 DIAGNOSIS — G45.9 TIA (TRANSIENT ISCHEMIC ATTACK): Primary | ICD-10-CM

## 2023-09-14 NOTE — PROGRESS NOTES
This is a very pleasant 82-year-old female who I had seen in initial consultation after having a TIA.  She is currently on Plavix and Crestor.  She reports no new neurologic issues since I saw her last.  She states that she feels good with no major issues at today's visit.        On examination today she is alert and oriented x3.  Her speech is fluent there is no dysarthria no aphasia.  Cranial nerves II through XII are intact.  She has 5 out of 5 strength in bilateral upper and lower extremities with normal tone and bulk.  There is no ataxia finger-nose or heel-to-shin.  She can walk 25 feet and 4 seconds.        In summary this a very pleasant 82-year-old female who had a previous history of a TIA.  She has no history of A-fib.  She will continue Plavix and Crestor she will continue good blood pressure control and glucose control with her PCP and cardiologist.  If she were to ever to have A-fib then we would need to switch her Plavix to a anticoagulant.  She will follow-up with me on an as-needed basis of course if she has any problems in the interim I be happy to see her again.  If she were to develop any acute focal neurologic deficit she should go straight to the emergency room.        I spent 30 minutes in patient care.

## 2023-09-28 ENCOUNTER — OFFICE VISIT (OUTPATIENT)
Dept: FAMILY MEDICINE CLINIC | Age: 83
End: 2023-09-28
Payer: MEDICARE

## 2023-09-28 VITALS
BODY MASS INDEX: 26.33 KG/M2 | HEART RATE: 64 BPM | SYSTOLIC BLOOD PRESSURE: 132 MMHG | DIASTOLIC BLOOD PRESSURE: 71 MMHG | TEMPERATURE: 97 F | WEIGHT: 163.8 LBS | HEIGHT: 66 IN

## 2023-09-28 DIAGNOSIS — M1A.0710 CHRONIC IDIOPATHIC GOUT INVOLVING TOE OF RIGHT FOOT WITHOUT TOPHUS: ICD-10-CM

## 2023-09-28 DIAGNOSIS — I63.81 LACUNAR INFARCTION: ICD-10-CM

## 2023-09-28 DIAGNOSIS — Z00.00 PHYSICAL EXAM: Primary | ICD-10-CM

## 2023-09-28 DIAGNOSIS — I10 ESSENTIAL HYPERTENSION: ICD-10-CM

## 2023-09-28 DIAGNOSIS — E78.2 MIXED HYPERLIPIDEMIA: ICD-10-CM

## 2023-09-28 DIAGNOSIS — K21.9 GERD WITHOUT ESOPHAGITIS: ICD-10-CM

## 2023-09-28 DIAGNOSIS — E11.9 TYPE 2 DIABETES MELLITUS WITHOUT COMPLICATION, WITHOUT LONG-TERM CURRENT USE OF INSULIN: ICD-10-CM

## 2023-09-28 DIAGNOSIS — E03.9 ACQUIRED HYPOTHYROIDISM: ICD-10-CM

## 2023-09-28 RX ORDER — PANTOPRAZOLE SODIUM 40 MG/1
40 TABLET, DELAYED RELEASE ORAL DAILY
Qty: 90 TABLET | Refills: 3 | Status: SHIPPED | OUTPATIENT
Start: 2023-09-28

## 2023-09-28 RX ORDER — LISINOPRIL 20 MG/1
20 TABLET ORAL DAILY
Qty: 90 TABLET | Refills: 3 | Status: SHIPPED | OUTPATIENT
Start: 2023-09-28

## 2023-09-28 RX ORDER — METFORMIN HYDROCHLORIDE 500 MG/1
1000 TABLET, EXTENDED RELEASE ORAL
Qty: 180 TABLET | Refills: 3 | Status: SHIPPED | OUTPATIENT
Start: 2023-09-28

## 2023-09-28 RX ORDER — ROSUVASTATIN CALCIUM 5 MG/1
5 TABLET, COATED ORAL DAILY
Qty: 90 TABLET | Refills: 3 | Status: SHIPPED | OUTPATIENT
Start: 2023-09-28

## 2023-09-28 RX ORDER — CLOPIDOGREL BISULFATE 75 MG/1
75 TABLET ORAL DAILY
Qty: 90 TABLET | Refills: 3 | Status: SHIPPED | OUTPATIENT
Start: 2023-09-28

## 2023-09-28 RX ORDER — LEVOTHYROXINE SODIUM 0.07 MG/1
75 TABLET ORAL
Qty: 90 TABLET | Refills: 3 | Status: SHIPPED | OUTPATIENT
Start: 2023-09-28 | End: 2023-09-30 | Stop reason: SDUPTHER

## 2023-09-28 NOTE — PROGRESS NOTES
The ABCs of the Annual Wellness Visit  Subsequent Medicare Wellness Visit    Subjective    Zoila Whipple is a 82 y.o. female who presents for a Subsequent Medicare Wellness Visit.    The following portions of the patient's history were reviewed and updated as appropriate: allergies, current medications, past family history, past medical history, past social history, past surgical history, and problem list.    Compared to one year ago, the patient feels her physical health is better.    Compared to one year ago, the patient feels her mental health is better.    Recent Hospitalizations:  She was not admitted to the hospital during the last year.     Current Medical Providers:  Patient Care Team:  Jewel Chavez MD as PCP - General (Family Medicine)  Sky Bowman APRN (Family Medicine)  Eliel Pavon MD as Consulting Physician (Urology)    Outpatient Medications Prior to Visit   Medication Sig Dispense Refill    allopurinol (ZYLOPRIM) 100 MG tablet Take 1 tablet by mouth Daily.      ascorbic acid (VITAMIN C) 250 MG tablet Take 1 tablet by mouth Daily.      cephalexin (KEFLEX) 250 MG capsule Take 1 capsule by mouth Daily.      cholecalciferol (VITAMIN D3) 25 MCG (1000 UT) tablet Take 1 tablet by mouth Daily.      metoprolol tartrate (LOPRESSOR) 25 MG tablet Take 1 tablet by mouth 2 (Two) Times a Day.      multivitamin with minerals tablet tablet Take 1 tablet by mouth Daily.      Omega-3 Fatty Acids (fish oil) 1000 MG capsule capsule Take 1 capsule by mouth Daily.      potassium chloride 10 MEQ CR tablet Take 1 tablet by mouth Daily.      torsemide (DEMADEX) 20 MG tablet Take 1 tablet by mouth Daily.      vitamin E 1000 UNIT capsule Take 1 capsule by mouth Daily.      clopidogrel (Plavix) 75 MG tablet Take 1 tablet by mouth Daily. 30 tablet 11    levothyroxine (Synthroid) 75 MCG tablet Take 1 tablet by mouth Every Morning. 90 tablet 3    lisinopril (PRINIVIL,ZESTRIL) 20 MG tablet Take 1  "tablet by mouth Daily. 90 tablet 3    metFORMIN ER (GLUCOPHAGE-XR) 500 MG 24 hr tablet Take 2 tablets by mouth Daily With Dinner. 180 tablet 3    pantoprazole (PROTONIX) 40 MG EC tablet Take 1 tablet by mouth Daily. 90 tablet 3    rosuvastatin (CRESTOR) 5 MG tablet Take 1 tablet by mouth Daily. 90 tablet 3     No facility-administered medications prior to visit.       No opioid medication identified on active medication list. I have reviewed chart for other potential  high risk medication/s and harmful drug interactions in the elderly.      Aspirin is not on active medication list.  Aspirin use is not indicated based on review of current medical condition/s. Risk of harm outweighs potential benefits.  She takes generic Plavix.    Patient Active Problem List   Diagnosis    Type 2 diabetes mellitus    S/P aortic valve replacement with prosthetic valve    Mixed hyperlipidemia    GERD without esophagitis    Acquired hypothyroidism    Essential hypertension    H/O aortic valve replacement with porcine valve    Lacunar infarction    Chronic idiopathic gout involving toe of right foot without tophus     Advance Care Planning  Advance Directive is not on file.  ACP discussion was held with the patient during this visit. Patient has an advance directive (not in EMR), copy requested.  Her children would make decisions if needed.     Objective    Vitals:    09/28/23 1322   BP: 132/71   BP Location: Left arm   Patient Position: Sitting   Pulse: 64   Temp: 97 °F (36.1 °C)   TempSrc: Oral   Weight: 74.3 kg (163 lb 12.8 oz)   Height: 166.4 cm (65.51\")   PainSc: 0-No pain     Estimated body mass index is 26.83 kg/m² as calculated from the following:    Height as of this encounter: 166.4 cm (65.51\").    Weight as of this encounter: 74.3 kg (163 lb 12.8 oz).    BMI is >= 25 and <30. (Overweight) The following options were offered after discussion;: exercise counseling/recommendations and nutrition counseling/recommendations    Does " the patient have evidence of cognitive impairment? No        HEALTH RISK ASSESSMENT    Smoking Status:  Social History     Tobacco Use   Smoking Status Never   Smokeless Tobacco Never     Alcohol Consumption:  Social History     Substance and Sexual Activity   Alcohol Use Never     Fall Risk Screen:    ANDREZ Fall Risk Assessment was completed, and patient is at LOW risk for falls.Assessment completed on:2023    Depression Screenin/28/2023     1:20 PM   PHQ-2/PHQ-9 Depression Screening   Little Interest or Pleasure in Doing Things 0-->not at all   Feeling Down, Depressed or Hopeless 0-->not at all   PHQ-9: Brief Depression Severity Measure Score 0       Health Habits and Functional and Cognitive Screenin/28/2023     1:20 PM   Functional & Cognitive Status   Do you have difficulty preparing food and eating? No   Do you have difficulty bathing yourself, getting dressed or grooming yourself? No   Do you have difficulty using the toilet? No   Do you have difficulty moving around from place to place? No   Do you have trouble with steps or getting out of a bed or a chair? No   Current Diet Well Balanced Diet   Dental Exam Not up to date   Eye Exam Up to date   Exercise (times per week) 7 times per week   Current Exercises Include Other        Exercise Comment STRETCHES   Do you need help using the phone?  No   Are you deaf or do you have serious difficulty hearing?  No   Do you need help to go to places out of walking distance? No   Do you need help shopping? No   Do you need help preparing meals?  No   Do you need help with housework?  No   Do you need help with laundry? No   Do you need help taking your medications? No   Do you need help managing money? No   Do you ever drive or ride in a car without wearing a seat belt? No   Have you felt unusual stress, anger or loneliness in the last month? Yes   Who do you live with? Alone   If you need help, do you have trouble finding someone available to  you? No   Have you been bothered in the last four weeks by sexual problems? No   Do you have difficulty concentrating, remembering or making decisions? No       Age-appropriate Screening Schedule:  Refer to the list below for future screening recommendations based on patient's age, sex and/or medical conditions. Orders for these recommended tests are listed in the plan section. The patient has been provided with a written plan.    Health Maintenance   Topic Date Due    BMI FOLLOWUP  Never done    TDAP/TD VACCINES (1 - Tdap) Never done    DIABETIC EYE EXAM  09/14/2022    COVID-19 Vaccine (5 - 2023-24 season) 09/01/2023    MAMMOGRAM  09/14/2023    INFLUENZA VACCINE  10/01/2023    HEMOGLOBIN A1C  11/16/2023    LIPID PANEL  02/10/2024    URINE MICROALBUMIN  02/21/2024    ANNUAL WELLNESS VISIT  09/28/2024    DXA SCAN  07/24/2025    Pneumococcal Vaccine 65+  Completed    ZOSTER VACCINE  Completed          CMS Preventative Services Quick Reference  Risk Factors Identified During Encounter  Chronic Pain:  Continue seeing podiatry for now.  Hearing Problem:  Wear hearing aids especially when driving.  Immunizations Discussed/Encouraged: Tdap, Influenza, and COVID19  The above risks/problems have been discussed with the patient.  Pertinent information has been shared with the patient in the After Visit Summary.  An After Visit Summary and PPPS were made available to the patient.    Follow Up:   Next Medicare Wellness visit to be scheduled in 1 year.     Additional E&M Note during same encounter follows:  Patient has multiple medical problems which are significant and separately identifiable that require additional work above and beyond the Medicare Wellness Visit.      Chief Complaint:  Diabetes, blood pressure, cholesterol, thyroid    Subjective    History of Present Illness:  In addition to the Medicare wellness exam, Zoila is also here for follow-up on her usual care.  She has type 2 diabetes for which she remains on  "metformin.  Her most recent A1c looked good.  Her blood sugars have been well controlled at home.  She denies significant hypoglycemia.    She also has several other problems including hypertension, elevated cholesterol, and hypothyroidism.  She remains on treatment for these.    Review of Systems:  Review of Systems   Constitutional:  Negative for chills and fever.   Respiratory:  Negative for cough and shortness of breath.    Cardiovascular:  Negative for chest pain and palpitations.   Gastrointestinal:  Negative for abdominal pain, nausea and vomiting.      Objective   Vital Signs:  /71 (BP Location: Left arm, Patient Position: Sitting)   Pulse 64   Temp 97 °F (36.1 °C) (Oral)   Ht 166.4 cm (65.51\")   Wt 74.3 kg (163 lb 12.8 oz)   BMI 26.83 kg/m²     Physical Exam  Vitals and nursing note reviewed.   Constitutional:       General: She is not in acute distress.     Appearance: She is not ill-appearing.   HENT:      Right Ear: Tympanic membrane and ear canal normal.      Left Ear: Tympanic membrane and ear canal normal.      Ears:      Comments: Hearing is normal with forced whisper.     Mouth/Throat:      Mouth: Mucous membranes are moist.      Comments: Pharynx appears normal  Eyes:      Extraocular Movements: Extraocular movements intact.      Pupils: Pupils are equal, round, and reactive to light.      Comments: Binocular vision is 20/20 with correction.   Neck:      Thyroid: No thyromegaly.   Cardiovascular:      Rate and Rhythm: Normal rate and regular rhythm.      Heart sounds: Murmur heard.   Pulmonary:      Effort: Pulmonary effort is normal.      Breath sounds: Normal breath sounds.   Abdominal:      General: There is no distension.      Palpations: Abdomen is soft. There is no mass.      Tenderness: There is no abdominal tenderness.   Musculoskeletal:      Cervical back: Normal range of motion.   Skin:     Findings: No lesion or rash.   Neurological:      General: No focal deficit present.     "  Mental Status: She is oriented to person, place, and time.      Cranial Nerves: No cranial nerve deficit.   Psychiatric:         Mood and Affect: Mood normal.     The following data was reviewed by Jewel Chavez MD on 09/28/2023.  Lab Results   Component Value Date    WBC 5.07 04/18/2023    HGB 10.3 (L) 04/18/2023    HCT 32.1 (L) 04/18/2023    MCV 85.6 04/18/2023     04/18/2023     Lab Results   Component Value Date    GLUCOSE 140 (H) 02/10/2023    BUN 18 02/10/2023    CREATININE 1.00 03/14/2023     02/10/2023    K 4.3 02/10/2023     02/10/2023    CO2 26.9 02/10/2023    CALCIUM 9.4 02/10/2023    PROTEINTOT 6.8 01/13/2023    ALBUMIN 4.3 01/13/2023    ALT 14 01/13/2023    AST 22 01/13/2023    ALKPHOS 101 01/13/2023    BILITOT 0.2 01/13/2023    EGFR 55.7 (L) 03/14/2023    GLOB 2.5 01/13/2023    AGRATIO 1.7 01/13/2023    BCR 18.8 02/10/2023    ANIONGAP 8.1 02/10/2023      Lab Results   Component Value Date    CHOL 113 02/10/2023    CHLPL 119 03/10/2021    TRIG 274 (H) 02/10/2023    HDL 30 (L) 02/10/2023    LDL 41 02/10/2023     Lab Results   Component Value Date    TSH 2.200 03/15/2023     Lab Results   Component Value Date    HGBA1C 6.6 05/16/2023             Assessment and Plan:   Zoila Dixon is doing well overall.  Regarding the Medicare wellness exam, we reviewed vaccines.  She declines the flu shot this year.  We will reach out to her when the COVID 19 booster is available.  She does have an appointment for a mammogram which is upcoming.  She declines colon cancer screening.    Regarding her usual problems, they seem to be stable currently.  We will refill needed medications and move ahead with updates on labs as noted.  We will tentatively plan to see her back in about 6 months, sooner if needed.    Diagnoses and all orders for this visit:    1. Physical exam (Primary)    2. Type 2 diabetes mellitus without complication, without long-term current use of insulin  -     metFORMIN ER  (GLUCOPHAGE-XR) 500 MG 24 hr tablet; Take 2 tablets by mouth Daily With Dinner.  Dispense: 180 tablet; Refill: 3  -     CBC (No Diff); Future  -     Hemoglobin A1c; Future    3. Essential hypertension  -     lisinopril (PRINIVIL,ZESTRIL) 20 MG tablet; Take 1 tablet by mouth Daily.  Dispense: 90 tablet; Refill: 3  -     Comprehensive Metabolic Panel; Future    4. Mixed hyperlipidemia  -     rosuvastatin (CRESTOR) 5 MG tablet; Take 1 tablet by mouth Daily.  Dispense: 90 tablet; Refill: 3  -     Comprehensive Metabolic Panel; Future    5. Acquired hypothyroidism  -     levothyroxine (Synthroid) 75 MCG tablet; Take 1 tablet by mouth Every Morning.  Dispense: 90 tablet; Refill: 3  -     TSH; Future    6. GERD without esophagitis  -     pantoprazole (PROTONIX) 40 MG EC tablet; Take 1 tablet by mouth Daily.  Dispense: 90 tablet; Refill: 3    7. Lacunar infarction  -     clopidogrel (Plavix) 75 MG tablet; Take 1 tablet by mouth Daily.  Dispense: 90 tablet; Refill: 3    8. Chronic idiopathic gout involving toe of right foot without tophus  -     Uric Acid; Future       Follow Up  Return in about 6 months (around 3/28/2024) for Recheck.  Patient was given instructions and counseling regarding her condition or for health maintenance advice. Please see specific information pulled into the AVS if appropriate.

## 2023-09-29 ENCOUNTER — CLINICAL SUPPORT (OUTPATIENT)
Dept: FAMILY MEDICINE CLINIC | Age: 83
End: 2023-09-29
Payer: MEDICARE

## 2023-09-29 ENCOUNTER — LAB (OUTPATIENT)
Dept: LAB | Facility: HOSPITAL | Age: 83
End: 2023-09-29
Payer: MEDICARE

## 2023-09-29 DIAGNOSIS — E03.9 ACQUIRED HYPOTHYROIDISM: ICD-10-CM

## 2023-09-29 DIAGNOSIS — E78.2 MIXED HYPERLIPIDEMIA: ICD-10-CM

## 2023-09-29 DIAGNOSIS — I10 ESSENTIAL HYPERTENSION: ICD-10-CM

## 2023-09-29 DIAGNOSIS — E11.9 TYPE 2 DIABETES MELLITUS WITHOUT COMPLICATION, WITHOUT LONG-TERM CURRENT USE OF INSULIN: ICD-10-CM

## 2023-09-29 DIAGNOSIS — Z23 NEED FOR INFLUENZA VACCINATION: Primary | ICD-10-CM

## 2023-09-29 DIAGNOSIS — M1A.0710 CHRONIC IDIOPATHIC GOUT INVOLVING TOE OF RIGHT FOOT WITHOUT TOPHUS: ICD-10-CM

## 2023-09-29 LAB
ALBUMIN SERPL-MCNC: 4 G/DL (ref 3.5–5.2)
ALBUMIN/GLOB SERPL: 1.4 G/DL
ALP SERPL-CCNC: 91 U/L (ref 39–117)
ALT SERPL W P-5'-P-CCNC: 12 U/L (ref 1–33)
ANION GAP SERPL CALCULATED.3IONS-SCNC: 10.3 MMOL/L (ref 5–15)
AST SERPL-CCNC: 20 U/L (ref 1–32)
BILIRUB SERPL-MCNC: 0.3 MG/DL (ref 0–1.2)
BUN SERPL-MCNC: 17 MG/DL (ref 8–23)
BUN/CREAT SERPL: 21.5 (ref 7–25)
CALCIUM SPEC-SCNC: 9.4 MG/DL (ref 8.6–10.5)
CHLORIDE SERPL-SCNC: 102 MMOL/L (ref 98–107)
CO2 SERPL-SCNC: 25.7 MMOL/L (ref 22–29)
CREAT SERPL-MCNC: 0.79 MG/DL (ref 0.57–1)
DEPRECATED RDW RBC AUTO: 47.3 FL (ref 37–54)
EGFRCR SERPLBLD CKD-EPI 2021: 73.9 ML/MIN/1.73
ERYTHROCYTE [DISTWIDTH] IN BLOOD BY AUTOMATED COUNT: 14.6 % (ref 12.3–15.4)
GLOBULIN UR ELPH-MCNC: 2.9 GM/DL
GLUCOSE SERPL-MCNC: 131 MG/DL (ref 65–99)
HBA1C MFR BLD: 6.4 % (ref 4.8–5.6)
HCT VFR BLD AUTO: 36.5 % (ref 34–46.6)
HGB BLD-MCNC: 11.6 G/DL (ref 12–15.9)
MCH RBC QN AUTO: 28.1 PG (ref 26.6–33)
MCHC RBC AUTO-ENTMCNC: 31.8 G/DL (ref 31.5–35.7)
MCV RBC AUTO: 88.4 FL (ref 79–97)
PLATELET # BLD AUTO: 184 10*3/MM3 (ref 140–450)
PMV BLD AUTO: 10.6 FL (ref 6–12)
POTASSIUM SERPL-SCNC: 4.6 MMOL/L (ref 3.5–5.2)
PROT SERPL-MCNC: 6.9 G/DL (ref 6–8.5)
RBC # BLD AUTO: 4.13 10*6/MM3 (ref 3.77–5.28)
SODIUM SERPL-SCNC: 138 MMOL/L (ref 136–145)
TSH SERPL DL<=0.05 MIU/L-ACNC: 9.61 UIU/ML (ref 0.27–4.2)
URATE SERPL-MCNC: 5.2 MG/DL (ref 2.4–5.7)
WBC NRBC COR # BLD: 5.31 10*3/MM3 (ref 3.4–10.8)

## 2023-09-29 PROCEDURE — 90662 IIV NO PRSV INCREASED AG IM: CPT | Performed by: FAMILY MEDICINE

## 2023-09-29 PROCEDURE — 83036 HEMOGLOBIN GLYCOSYLATED A1C: CPT

## 2023-09-29 PROCEDURE — G0008 ADMIN INFLUENZA VIRUS VAC: HCPCS | Performed by: FAMILY MEDICINE

## 2023-09-29 PROCEDURE — 84550 ASSAY OF BLOOD/URIC ACID: CPT

## 2023-09-29 PROCEDURE — 36415 COLL VENOUS BLD VENIPUNCTURE: CPT

## 2023-09-29 PROCEDURE — 85027 COMPLETE CBC AUTOMATED: CPT

## 2023-09-29 PROCEDURE — 80053 COMPREHEN METABOLIC PANEL: CPT

## 2023-09-29 PROCEDURE — 84443 ASSAY THYROID STIM HORMONE: CPT

## 2023-09-30 RX ORDER — LEVOTHYROXINE SODIUM 88 UG/1
88 TABLET ORAL
Qty: 90 TABLET | Refills: 3 | Status: SHIPPED | OUTPATIENT
Start: 2023-09-30

## 2023-10-25 ENCOUNTER — HOSPITAL ENCOUNTER (OUTPATIENT)
Dept: MAMMOGRAPHY | Facility: HOSPITAL | Age: 83
Discharge: HOME | End: 2023-10-25
Admitting: FAMILY MEDICINE
Payer: MEDICARE

## 2023-10-25 DIAGNOSIS — Z12.31 ENCOUNTER FOR SCREENING MAMMOGRAM FOR MALIGNANT NEOPLASM OF BREAST: ICD-10-CM

## 2023-10-25 PROCEDURE — 77067 SCR MAMMO BI INCL CAD: CPT

## 2023-10-25 PROCEDURE — 77063 BREAST TOMOSYNTHESIS BI: CPT

## 2024-03-23 DIAGNOSIS — E03.9 ACQUIRED HYPOTHYROIDISM: Primary | ICD-10-CM

## 2024-03-23 DIAGNOSIS — I10 ESSENTIAL HYPERTENSION: ICD-10-CM

## 2024-03-25 RX ORDER — LISINOPRIL 20 MG/1
20 TABLET ORAL DAILY
Qty: 90 TABLET | Refills: 0 | Status: SHIPPED | OUTPATIENT
Start: 2024-03-25 | End: 2024-03-26 | Stop reason: SDUPTHER

## 2024-03-25 RX ORDER — LEVOTHYROXINE SODIUM 0.07 MG/1
75 TABLET ORAL
Qty: 90 TABLET | Refills: 0 | Status: SHIPPED | OUTPATIENT
Start: 2024-03-25 | End: 2024-03-26

## 2024-03-26 ENCOUNTER — OFFICE VISIT (OUTPATIENT)
Dept: FAMILY MEDICINE CLINIC | Age: 84
End: 2024-03-26
Payer: MEDICARE

## 2024-03-26 ENCOUNTER — LAB (OUTPATIENT)
Dept: LAB | Facility: HOSPITAL | Age: 84
End: 2024-03-26
Payer: MEDICARE

## 2024-03-26 VITALS
HEIGHT: 66 IN | SYSTOLIC BLOOD PRESSURE: 139 MMHG | WEIGHT: 162.8 LBS | BODY MASS INDEX: 26.16 KG/M2 | TEMPERATURE: 97.5 F | HEART RATE: 59 BPM | DIASTOLIC BLOOD PRESSURE: 40 MMHG

## 2024-03-26 DIAGNOSIS — M1A.0710 CHRONIC IDIOPATHIC GOUT INVOLVING TOE OF RIGHT FOOT WITHOUT TOPHUS: ICD-10-CM

## 2024-03-26 DIAGNOSIS — I63.81 LACUNAR INFARCTION: ICD-10-CM

## 2024-03-26 DIAGNOSIS — E11.9 TYPE 2 DIABETES MELLITUS WITHOUT COMPLICATION, WITHOUT LONG-TERM CURRENT USE OF INSULIN: Primary | ICD-10-CM

## 2024-03-26 DIAGNOSIS — E03.9 ACQUIRED HYPOTHYROIDISM: ICD-10-CM

## 2024-03-26 DIAGNOSIS — I10 ESSENTIAL HYPERTENSION: ICD-10-CM

## 2024-03-26 DIAGNOSIS — E78.2 MIXED HYPERLIPIDEMIA: ICD-10-CM

## 2024-03-26 DIAGNOSIS — E11.9 TYPE 2 DIABETES MELLITUS WITHOUT COMPLICATION, WITHOUT LONG-TERM CURRENT USE OF INSULIN: ICD-10-CM

## 2024-03-26 DIAGNOSIS — Z79.899 OTHER LONG TERM (CURRENT) DRUG THERAPY: ICD-10-CM

## 2024-03-26 DIAGNOSIS — K21.9 GERD WITHOUT ESOPHAGITIS: ICD-10-CM

## 2024-03-26 LAB
ALBUMIN SERPL-MCNC: 4.1 G/DL (ref 3.5–5.2)
ALBUMIN/GLOB SERPL: 1.5 G/DL
ALP SERPL-CCNC: 100 U/L (ref 39–117)
ALT SERPL W P-5'-P-CCNC: 16 U/L (ref 1–33)
ANION GAP SERPL CALCULATED.3IONS-SCNC: 9.4 MMOL/L (ref 5–15)
AST SERPL-CCNC: 19 U/L (ref 1–32)
BILIRUB SERPL-MCNC: 0.3 MG/DL (ref 0–1.2)
BUN SERPL-MCNC: 20 MG/DL (ref 8–23)
BUN/CREAT SERPL: 21.7 (ref 7–25)
CALCIUM SPEC-SCNC: 9.2 MG/DL (ref 8.6–10.5)
CHLORIDE SERPL-SCNC: 102 MMOL/L (ref 98–107)
CHOLEST SERPL-MCNC: 126 MG/DL (ref 0–200)
CO2 SERPL-SCNC: 27.6 MMOL/L (ref 22–29)
CREAT SERPL-MCNC: 0.92 MG/DL (ref 0.57–1)
DEPRECATED RDW RBC AUTO: 47.3 FL (ref 37–54)
EGFRCR SERPLBLD CKD-EPI 2021: 61.1 ML/MIN/1.73
ERYTHROCYTE [DISTWIDTH] IN BLOOD BY AUTOMATED COUNT: 14.6 % (ref 12.3–15.4)
GLOBULIN UR ELPH-MCNC: 2.7 GM/DL
GLUCOSE SERPL-MCNC: 92 MG/DL (ref 65–99)
HBA1C MFR BLD: 6.2 % (ref 4.8–5.6)
HCT VFR BLD AUTO: 36.6 % (ref 34–46.6)
HDLC SERPL-MCNC: 29 MG/DL (ref 40–60)
HGB BLD-MCNC: 12.1 G/DL (ref 12–15.9)
LDLC SERPL CALC-MCNC: 42 MG/DL (ref 0–100)
LDLC/HDLC SERPL: 0.78 {RATIO}
MCH RBC QN AUTO: 29.2 PG (ref 26.6–33)
MCHC RBC AUTO-ENTMCNC: 33.1 G/DL (ref 31.5–35.7)
MCV RBC AUTO: 88.4 FL (ref 79–97)
PLATELET # BLD AUTO: 175 10*3/MM3 (ref 140–450)
PMV BLD AUTO: 11.5 FL (ref 6–12)
POTASSIUM SERPL-SCNC: 4.8 MMOL/L (ref 3.5–5.2)
PROT SERPL-MCNC: 6.8 G/DL (ref 6–8.5)
RBC # BLD AUTO: 4.14 10*6/MM3 (ref 3.77–5.28)
SODIUM SERPL-SCNC: 139 MMOL/L (ref 136–145)
TRIGL SERPL-MCNC: 372 MG/DL (ref 0–150)
TSH SERPL DL<=0.05 MIU/L-ACNC: 7.6 UIU/ML (ref 0.27–4.2)
URATE SERPL-MCNC: 5.8 MG/DL (ref 2.4–5.7)
VLDLC SERPL-MCNC: 55 MG/DL (ref 5–40)
WBC NRBC COR # BLD AUTO: 5.7 10*3/MM3 (ref 3.4–10.8)

## 2024-03-26 PROCEDURE — 80053 COMPREHEN METABOLIC PANEL: CPT

## 2024-03-26 PROCEDURE — 80061 LIPID PANEL: CPT

## 2024-03-26 PROCEDURE — 84443 ASSAY THYROID STIM HORMONE: CPT

## 2024-03-26 PROCEDURE — 85027 COMPLETE CBC AUTOMATED: CPT

## 2024-03-26 PROCEDURE — 83036 HEMOGLOBIN GLYCOSYLATED A1C: CPT

## 2024-03-26 PROCEDURE — 36415 COLL VENOUS BLD VENIPUNCTURE: CPT

## 2024-03-26 PROCEDURE — 84550 ASSAY OF BLOOD/URIC ACID: CPT

## 2024-03-26 RX ORDER — CLOPIDOGREL BISULFATE 75 MG/1
75 TABLET ORAL DAILY
Qty: 90 TABLET | Refills: 3 | Status: SHIPPED | OUTPATIENT
Start: 2024-03-26

## 2024-03-26 RX ORDER — ROSUVASTATIN CALCIUM 5 MG/1
5 TABLET, COATED ORAL DAILY
Qty: 90 TABLET | Refills: 3 | Status: SHIPPED | OUTPATIENT
Start: 2024-03-26

## 2024-03-26 RX ORDER — LISINOPRIL 20 MG/1
20 TABLET ORAL DAILY
Qty: 90 TABLET | Refills: 3 | Status: SHIPPED | OUTPATIENT
Start: 2024-03-26

## 2024-03-26 RX ORDER — LEVOTHYROXINE SODIUM 88 UG/1
88 TABLET ORAL
Qty: 90 TABLET | Refills: 3 | Status: SHIPPED | OUTPATIENT
Start: 2024-03-26 | End: 2024-03-27

## 2024-03-26 RX ORDER — PANTOPRAZOLE SODIUM 40 MG/1
40 TABLET, DELAYED RELEASE ORAL DAILY
Qty: 90 TABLET | Refills: 3 | Status: SHIPPED | OUTPATIENT
Start: 2024-03-26

## 2024-03-26 RX ORDER — METFORMIN HYDROCHLORIDE 500 MG/1
1000 TABLET, EXTENDED RELEASE ORAL
Qty: 180 TABLET | Refills: 3 | Status: SHIPPED | OUTPATIENT
Start: 2024-03-26

## 2024-03-26 NOTE — PROGRESS NOTES
"Zoila Whipple presents to [facility].    Chief Complaint:  Diabetes, blood pressure, thyroid, cholesterol    Subjective   History of Present Illness:  Zoila Dixon is being seen today for follow-up on her care.  She has type 2 diabetes, hypertension, hypothyroidism, and elevated cholesterol for which she remains on treatments as outlined.  Her blood pressure is mildly elevated on initial check.  She does not routinely check her blood pressure at home.  She says her blood sugar typically runs in the 90s.    Review of Systems:  Review of Systems   Constitutional:  Negative for chills and fever.   Respiratory:  Positive for shortness of breath (with exertion). Negative for cough.    Cardiovascular:  Negative for chest pain and palpitations.   Gastrointestinal:  Negative for abdominal pain, nausea and vomiting.      Objective   Medical History:  Past Medical History:    Acquired hypothyroidism    Essential hypertension    GERD without esophagitis    H/O aortic valve replacement with porcine valve    Mixed hyperlipidemia    Type 2 diabetes mellitus     Past Surgical History:    AORTIC VALVE REPAIR/REPLACEMENT    APPENDECTOMY    HYSTERECTOMY    INGUINAL HERNIA REPAIR    LAMINECTOMY      Family History   Problem Relation Age of Onset    No Known Problems Mother         Mother  94 years old \"old age\"    Heart attack Father         Father  49 years old MI     Social History     Tobacco Use    Smoking status: Never    Smokeless tobacco: Never   Substance Use Topics    Alcohol use: Never       Health Maintenance Due   Topic Date Due    TDAP/TD VACCINES (1 - Tdap) Never done    RSV Vaccine - Adults (1 - 1-dose 60+ series) Never done    LIPID PANEL  02/10/2024    URINE MICROALBUMIN  2024    HEMOGLOBIN A1C  2024        Immunization History   Administered Date(s) Administered    COVID-19 (MODERNA) 1st,2nd,3rd Dose Monovalent 2021, 2021    COVID-19 (PFIZER) Purple Cap " Monovalent 12/03/2021    Covid-19 (Pfizer) Gray Cap Monovalent 06/21/2022    Fluzone High Dose =>65 Years ([employer] ONLY) 09/21/2017    Fluzone High-Dose 65+yrs 09/29/2021, 10/20/2022, 09/29/2023    Influenza Quad Vaccine (Inpatient) 10/08/2013    Pneumococcal Conjugate 13-Valent (PCV13) 09/21/2016    Pneumococcal Polysaccharide (PPSV23) 09/21/2017    Shingrix 06/09/2023, 09/05/2023       Allergies   Allergen Reactions    Medrol [Methylprednisolone] Rash    Niacin Rash    Sulfamethoxazole-Trimethoprim Rash        Medications:  Current Outpatient Medications on File Prior to Visit   Medication Sig    allopurinol (ZYLOPRIM) 100 MG tablet Take 1 tablet by mouth Daily.    ascorbic acid (VITAMIN C) 250 MG tablet Take 1 tablet by mouth Daily.    cephalexin (KEFLEX) 250 MG capsule Take 1 capsule by mouth Daily.    cholecalciferol (VITAMIN D3) 25 MCG (1000 UT) tablet Take 1 tablet by mouth Daily.    metoprolol tartrate (LOPRESSOR) 25 MG tablet Take 1 tablet by mouth 2 (Two) Times a Day.    multivitamin with minerals tablet tablet Take 1 tablet by mouth Daily.    potassium chloride 10 MEQ CR tablet Take 1 tablet by mouth Daily.    torsemide (DEMADEX) 20 MG tablet Take 1 tablet by mouth Daily.    vitamin E 1000 UNIT capsule Take 1 capsule by mouth Daily.    [DISCONTINUED] clopidogrel (Plavix) 75 MG tablet Take 1 tablet by mouth Daily.    [DISCONTINUED] levothyroxine (Synthroid) 88 MCG tablet Take 1 tablet by mouth Every Morning.    [DISCONTINUED] lisinopril (PRINIVIL,ZESTRIL) 20 MG tablet Take 1 tablet by mouth Daily.    [DISCONTINUED] metFORMIN ER (GLUCOPHAGE-XR) 500 MG 24 hr tablet Take 2 tablets by mouth Daily With Dinner.    [DISCONTINUED] Omega-3 Fatty Acids (fish oil) 1000 MG capsule capsule Take 1 capsule by mouth Daily.    [DISCONTINUED] pantoprazole (PROTONIX) 40 MG EC tablet Take 1 tablet by mouth Daily.    [DISCONTINUED] rosuvastatin (CRESTOR) 5 MG tablet Take 1 tablet by mouth Daily.    [DISCONTINUED] levothyroxine  "(Synthroid) 75 MCG tablet Take 1 tablet by mouth Every Morning. (Patient not taking: Reported on 3/26/2024)     No current facility-administered medications on file prior to visit.       Vital Signs:   Vitals:    03/26/24 1021 03/26/24 1054   BP: 146/42 139/40   BP Location: Left arm Right arm   Patient Position: Sitting Sitting   Pulse: 58 59   Temp: 97.5 °F (36.4 °C)    TempSrc: Oral    Weight: 73.8 kg (162 lb 12.8 oz)    Height: 166.4 cm (65.51\")    Body mass index is 26.67 kg/m².    Physical Exam:  Physical Exam  Vitals reviewed.   Constitutional:       General: She is not in acute distress.     Appearance: She is not ill-appearing.   Eyes:      Pupils: Pupils are equal, round, and reactive to light.   Neck:      Comments: No thyromegaly  Cardiovascular:      Rate and Rhythm: Normal rate and regular rhythm.   Pulmonary:      Effort: Pulmonary effort is normal.      Breath sounds: Normal breath sounds.   Abdominal:      General: There is no distension.      Palpations: Abdomen is soft.      Tenderness: There is no abdominal tenderness.   Musculoskeletal:      Cervical back: Neck supple.   Lymphadenopathy:      Cervical: No cervical adenopathy.   Skin:     Findings: No lesion or rash.   Neurological:      Mental Status: She is alert.     Result Review   The following data was reviewed by Jewel Chavez MD on 03/26/2024.  Lab Results   Component Value Date    WBC 5.31 09/29/2023    HGB 11.6 (L) 09/29/2023    HCT 36.5 09/29/2023    MCV 88.4 09/29/2023     09/29/2023     Lab Results   Component Value Date    GLUCOSE 131 (H) 09/29/2023    BUN 17 09/29/2023    CREATININE 0.79 09/29/2023     09/29/2023    K 4.6 09/29/2023     09/29/2023    CO2 25.7 09/29/2023    CALCIUM 9.4 09/29/2023    PROTEINTOT 6.9 09/29/2023    ALBUMIN 4.0 09/29/2023    ALT 12 09/29/2023    AST 20 09/29/2023    ALKPHOS 91 09/29/2023    BILITOT 0.3 09/29/2023    EGFR 73.9 09/29/2023    GLOB 2.9 09/29/2023    AGRATIO 1.4 " 09/29/2023    BCR 21.5 09/29/2023    ANIONGAP 10.3 09/29/2023      Lab Results   Component Value Date    CHOL 113 02/10/2023    CHLPL 119 03/10/2021    TRIG 274 (H) 02/10/2023    HDL 30 (L) 02/10/2023    LDL 41 02/10/2023     Lab Results   Component Value Date    TSH 9.610 (H) 09/29/2023     Lab Results   Component Value Date    HGBA1C 6.40 (H) 09/29/2023     BMI is >= 25 and <30. (Overweight) The following options were offered after discussion;: exercise counseling/recommendations and nutrition counseling/recommendations         Assessment and Plan:   Today, we have reviewed her care.  Zoila Dixon seems to be doing well for the most part.  She does have aortic valve stenosis and may be looking at another valve replacement.  For today, we will refill her medications and update labs as noted.  Tentative follow-up will be again in about 6 months.  Her blood pressure is mildly elevated, and we will repeat it before she leaves.  She also is having an issue with some mouth soreness that has been present for several months.  I am going to give her some nystatin to try.  I asked her to reach back out in a couple of weeks if it is not improving.  We may want to have her see ENT for second opinion.    Diagnoses and all orders for this visit:    1. Type 2 diabetes mellitus without complication, without long-term current use of insulin (Primary)  -     MicroAlbumin, Urine, Random - Urine, Clean Catch; Future  -     Hemoglobin A1c; Future  -     Comprehensive Metabolic Panel; Future  -     metFORMIN ER (GLUCOPHAGE-XR) 500 MG 24 hr tablet; Take 2 tablets by mouth Daily With Dinner.  Dispense: 180 tablet; Refill: 3    2. Essential hypertension  -     Comprehensive Metabolic Panel; Future  -     lisinopril (PRINIVIL,ZESTRIL) 20 MG tablet; Take 1 tablet by mouth Daily.  Dispense: 90 tablet; Refill: 3    3. Mixed hyperlipidemia  -     Lipid Panel; Future  -     Comprehensive Metabolic Panel; Future  -     rosuvastatin (CRESTOR) 5 MG  tablet; Take 1 tablet by mouth Daily.  Dispense: 90 tablet; Refill: 3    4. Acquired hypothyroidism  -     TSH; Future  -     levothyroxine (Synthroid) 88 MCG tablet; Take 1 tablet by mouth Every Morning.  Dispense: 90 tablet; Refill: 3    5. Chronic idiopathic gout involving toe of right foot without tophus  -     Uric Acid; Future    6. Lacunar infarction  -     clopidogrel (Plavix) 75 MG tablet; Take 1 tablet by mouth Daily.  Dispense: 90 tablet; Refill: 3    7. GERD without esophagitis  -     pantoprazole (PROTONIX) 40 MG EC tablet; Take 1 tablet by mouth Daily.  Dispense: 90 tablet; Refill: 3    8. Other long term (current) drug therapy  -     CBC (No Diff); Future    Other orders  -     nystatin (MYCOSTATIN) 100,000 unit/mL suspension; Swish and swallow 5 mL 4 (Four) Times a Day.  Dispense: 200 mL; Refill: 1    Follow Up  Return in about 6 months (around 9/26/2024) for Recheck, Medicare Wellness.  Patient was given instructions and counseling regarding her condition or for health maintenance advice. Please see specific information pulled into the AVS if appropriate.

## 2024-03-26 NOTE — Clinical Note
TICKLE to call her in 2 weeks.  Has the issue with her mouth resolved?  If not, please make referral to ENT for mouth soreness.  Thanks.

## 2024-03-27 RX ORDER — LEVOTHYROXINE SODIUM 0.1 MG/1
100 TABLET ORAL
Qty: 90 TABLET | Refills: 3 | Status: SHIPPED | OUTPATIENT
Start: 2024-03-27

## 2024-04-03 ENCOUNTER — TELEPHONE (OUTPATIENT)
Dept: FAMILY MEDICINE CLINIC | Age: 84
End: 2024-04-03
Payer: MEDICARE

## 2024-04-03 DIAGNOSIS — M1A.0710 CHRONIC IDIOPATHIC GOUT INVOLVING TOE OF RIGHT FOOT WITHOUT TOPHUS: Primary | ICD-10-CM

## 2024-04-03 NOTE — TELEPHONE ENCOUNTER
Pt needing new referral for continuation of care r/t gout. She has an appt 4/5/24.Order placed, please sign

## 2024-04-09 ENCOUNTER — TELEPHONE (OUTPATIENT)
Dept: FAMILY MEDICINE CLINIC | Age: 84
End: 2024-04-09
Payer: MEDICARE

## 2024-04-09 NOTE — TELEPHONE ENCOUNTER
Pt states it is getting better, so she will see how things progress. She will call back if she desires referral.

## 2024-04-09 NOTE — TELEPHONE ENCOUNTER
----- Message from Whitney Obregon LPN sent at 3/26/2024 10:52 AM EDT -----  TICKLE to call her in 2 weeks.  Has the issue with her mouth resolved?  If not, please make referral to ENT for mouth soreness.  Thanks.

## 2024-05-31 DIAGNOSIS — E11.9 TYPE 2 DIABETES MELLITUS WITHOUT COMPLICATION, WITHOUT LONG-TERM CURRENT USE OF INSULIN: ICD-10-CM

## 2024-05-31 RX ORDER — METFORMIN HYDROCHLORIDE 500 MG/1
1000 TABLET, EXTENDED RELEASE ORAL
Qty: 180 TABLET | Refills: 0 | Status: SHIPPED | OUTPATIENT
Start: 2024-05-31

## 2024-05-31 NOTE — TELEPHONE ENCOUNTER
"Caller: Zoila Whipple \"Zoila Pomeroy\"    Relationship: Self    Best call back number:     [phone]       Requested Prescriptions:   Requested Prescriptions     Pending Prescriptions Disp Refills    metFORMIN ER (GLUCOPHAGE-XR) 500 MG 24 hr tablet 180 tablet 3     Sig: Take 2 tablets by mouth Daily With Dinner.        Pharmacy where request should be sent: [facility] [id number] [place], [place] - [address] [facility] [phone] [facility] [phone] FX     Last office visit with prescribing clinician: 3/26/2024   Last telemedicine visit with prescribing clinician: Visit date not found   Next office visit with prescribing clinician: 10/29/2024     Additional details provided by patient: PATIENT HAS BEEN OUT OF MEDICATION FOR 2 WEEKS. IS HAVING DIFFICULTY GETTING PHARMACY TO SEND TO HER. STATES THAT PHARMACY ADVISED THEY COULD GET IT TO HER IN TWO WEEKS. HOWEVER SHE IS NEED OF MEDICATION NOW.     Does the patient have less than a 3 day supply:  [x] Yes  [] No    Would you like a call back once the refill request has been completed: [] Yes [x] No    If the office needs to give you a call back, can they leave a voicemail: [] Yes [x] No    Alton Caldera Rep   05/31/24 09:54 EDT         "

## 2024-06-18 ENCOUNTER — OFFICE VISIT (OUTPATIENT)
Dept: FAMILY MEDICINE CLINIC | Age: 84
End: 2024-06-18
Payer: MEDICARE

## 2024-06-18 VITALS
WEIGHT: 159 LBS | DIASTOLIC BLOOD PRESSURE: 47 MMHG | TEMPERATURE: 98.1 F | OXYGEN SATURATION: 96 % | SYSTOLIC BLOOD PRESSURE: 157 MMHG | BODY MASS INDEX: 25.55 KG/M2 | HEART RATE: 68 BPM | HEIGHT: 66 IN

## 2024-06-18 DIAGNOSIS — R30.0 DYSURIA: Primary | ICD-10-CM

## 2024-06-18 PROCEDURE — 3078F DIAST BP <80 MM HG: CPT | Performed by: FAMILY MEDICINE

## 2024-06-18 PROCEDURE — 1160F RVW MEDS BY RX/DR IN RCRD: CPT | Performed by: FAMILY MEDICINE

## 2024-06-18 PROCEDURE — 1126F AMNT PAIN NOTED NONE PRSNT: CPT | Performed by: FAMILY MEDICINE

## 2024-06-18 PROCEDURE — 1159F MED LIST DOCD IN RCRD: CPT | Performed by: FAMILY MEDICINE

## 2024-06-18 PROCEDURE — 3077F SYST BP >= 140 MM HG: CPT | Performed by: FAMILY MEDICINE

## 2024-06-18 PROCEDURE — 99213 OFFICE O/P EST LOW 20 MIN: CPT | Performed by: FAMILY MEDICINE

## 2024-06-18 PROCEDURE — G2211 COMPLEX E/M VISIT ADD ON: HCPCS | Performed by: FAMILY MEDICINE

## 2024-06-18 RX ORDER — LEVOTHYROXINE SODIUM 88 MCG
88 TABLET ORAL DAILY
COMMUNITY
Start: 2024-05-21

## 2024-06-18 RX ORDER — LEVOFLOXACIN 500 MG/1
500 TABLET, FILM COATED ORAL DAILY
Qty: 10 TABLET | Refills: 0 | Status: SHIPPED | OUTPATIENT
Start: 2024-06-18

## 2024-06-18 NOTE — PROGRESS NOTES
"Zoila Whipple presents to [facility].    Chief Complaint:  Presumed UTI, hematuria    Subjective   History of Present Illness:  Zoila Dixon is being seen today for presumed UTI.  She has been having blood in the urine for the last 3 weeks.  She says that this started after heart catheterization.  She does self catheterize and has been doing that for about 15 years.  She denies pain when she does that.  She has passed some clots with this as well.  She is unsure how to move forward.    Review of Systems:  Review of Systems   Constitutional:  Negative for chills and fever.   Respiratory:  Negative for cough and shortness of breath.    Cardiovascular:  Negative for chest pain and palpitations.   Gastrointestinal:  Negative for abdominal pain, nausea and vomiting.        Objective   Medical History:  Past Medical History:    Acquired hypothyroidism    Essential hypertension    GERD without esophagitis    H/O aortic valve replacement with porcine valve    Mixed hyperlipidemia    Type 2 diabetes mellitus     Past Surgical History:    AORTIC VALVE REPAIR/REPLACEMENT    APPENDECTOMY    HYSTERECTOMY    INGUINAL HERNIA REPAIR    LAMINECTOMY      Family History   Problem Relation Age of Onset    No Known Problems Mother         Mother  94 years old \"old age\"    Heart attack Father         Father  49 years old MI     Social History     Tobacco Use    Smoking status: Never    Smokeless tobacco: Never   Substance Use Topics    Alcohol use: Never       Health Maintenance Due   Topic Date Due    TDAP/TD VACCINES (1 - Tdap) Never done    RSV Vaccine - Adults (1 - 1-dose 60+ series) Never done    URINE MICROALBUMIN  2024        Immunization History   Administered Date(s) Administered    COVID-19 (MODERNA) 1st,2nd,3rd Dose Monovalent 2021, 2021    COVID-19 (PFIZER) Purple Cap Monovalent 2021    Covid-19 (Pfizer) Gray Cap Monovalent 2022    Fluzone High Dose =>65 Years " ([facility] ONLY) 09/21/2017    Fluzone High-Dose 65+yrs 09/29/2021, 10/20/2022, 09/29/2023    Influenza Quad Vaccine (Inpatient) 10/08/2013    Pneumococcal Conjugate 13-Valent (PCV13) 09/21/2016    Pneumococcal Polysaccharide (PPSV23) 09/21/2017    Shingrix 06/09/2023, 09/05/2023       Allergies   Allergen Reactions    Medrol [Methylprednisolone] Rash    Niacin Rash    Sulfamethoxazole-Trimethoprim Rash        Medications:  Current Outpatient Medications on File Prior to Visit   Medication Sig    allopurinol (ZYLOPRIM) 100 MG tablet Take 1 tablet by mouth Daily.    cephalexin (KEFLEX) 250 MG capsule Take 1 capsule by mouth Daily.    cholecalciferol (VITAMIN D3) 25 MCG (1000 UT) tablet Take 1 tablet by mouth Daily.    clopidogrel (Plavix) 75 MG tablet Take 1 tablet by mouth Daily.    lisinopril (PRINIVIL,ZESTRIL) 20 MG tablet Take 1 tablet by mouth Daily.    metFORMIN ER (GLUCOPHAGE-XR) 500 MG 24 hr tablet Take 2 tablets by mouth Daily With Dinner.    metoprolol tartrate (LOPRESSOR) 25 MG tablet Take 1 tablet by mouth 2 (Two) Times a Day.    multivitamin with minerals tablet tablet Take 1 tablet by mouth Daily.    nystatin (MYCOSTATIN) 100,000 unit/mL suspension SWISH AND SWALLOW 5 ML BY MOUTH FOUR TIMES DAILY    pantoprazole (PROTONIX) 40 MG EC tablet Take 1 tablet by mouth Daily.    potassium chloride 10 MEQ CR tablet Take 1 tablet by mouth Daily.    rosuvastatin (CRESTOR) 5 MG tablet Take 1 tablet by mouth Daily.    Synthroid 88 MCG tablet Take 1 tablet by mouth Daily.    torsemide (DEMADEX) 20 MG tablet Take 1 tablet by mouth Daily.    [DISCONTINUED] ascorbic acid (VITAMIN C) 250 MG tablet Take 1 tablet by mouth Daily.    [DISCONTINUED] levothyroxine (Synthroid) 100 MCG tablet Take 1 tablet by mouth Every Morning.    [DISCONTINUED] vitamin E 1000 UNIT capsule Take 1 capsule by mouth Daily.     No current facility-administered medications on file prior to visit.       Vital Signs:   /54 (BP Location: Right  "arm, Patient Position: Sitting)   Pulse 67   Temp 98.1 °F (36.7 °C) (Oral)   Ht 166.4 cm (65.51\")   Wt 72.1 kg (159 lb)   SpO2 96%   BMI 26.05 kg/m²       Physical Exam:  Physical Exam  Vitals reviewed.   Constitutional:       General: She is not in acute distress.     Appearance: She is not ill-appearing.   Eyes:      Pupils: Pupils are equal, round, and reactive to light.   Neck:      Comments: No thyromegaly  Cardiovascular:      Rate and Rhythm: Normal rate and regular rhythm.   Pulmonary:      Effort: Pulmonary effort is normal.      Breath sounds: Normal breath sounds.   Abdominal:      General: There is no distension.      Palpations: Abdomen is soft.      Tenderness: There is no abdominal tenderness.   Musculoskeletal:      Cervical back: Neck supple.   Lymphadenopathy:      Cervical: No cervical adenopathy.   Skin:     Findings: No lesion or rash.   Neurological:      Mental Status: She is alert.         Result Review   The following data was reviewed by Jewel Chavez MD on 06/18/2024.  Lab Results   Component Value Date    WBC 5.70 03/26/2024    HGB 12.1 03/26/2024    HCT 36.6 03/26/2024    MCV 88.4 03/26/2024     03/26/2024     Lab Results   Component Value Date    GLUCOSE 92 03/26/2024    BUN 20 03/26/2024    CREATININE 0.92 03/26/2024     03/26/2024    K 4.8 03/26/2024     03/26/2024    CO2 27.6 03/26/2024    CALCIUM 9.2 03/26/2024    PROTEINTOT 6.8 03/26/2024    ALBUMIN 4.1 03/26/2024    ALT 16 03/26/2024    AST 19 03/26/2024    ALKPHOS 100 03/26/2024    BILITOT 0.3 03/26/2024    EGFR 61.1 03/26/2024    GLOB 2.7 03/26/2024    AGRATIO 1.5 03/26/2024    BCR 21.7 03/26/2024    ANIONGAP 9.4 03/26/2024      Lab Results   Component Value Date    CHOL 126 03/26/2024    CHLPL 119 03/10/2021    TRIG 372 (H) 03/26/2024    HDL 29 (L) 03/26/2024    LDL 42 03/26/2024     Lab Results   Component Value Date    TSH 7.600 (H) 03/26/2024     Lab Results   Component Value Date    HGBA1C " 6.20 (H) 03/26/2024          Assessment and Plan:   She is not feeling well and is passing quite a bit of blood in her urine.  She very likely has a urinary tract infection based on her symptoms.  We will move ahead with covering her as noted.  I did ask her to collect a specimen if possible and bring it back over for us to run it.  It would be ideal if she could collect the specimen before starting the antibiotic, but I do not want her to delay starting the medication.    Diagnoses and all orders for this visit:    1. Dysuria (Primary)  -     Urinalysis With Microscopic - Urine, Clean Catch  -     Urine Culture - Urine, Urine, Clean Catch  -     levoFLOXacin (Levaquin) 500 MG tablet; Take 1 tablet by mouth Daily.  Dispense: 10 tablet; Refill: 0    Follow Up  Return in about 19 weeks (around 10/29/2024) for Recheck, Next scheduled follow up.  Patient was given instructions and counseling regarding her condition or for health maintenance advice. Please see specific information pulled into the AVS if appropriate.

## 2024-06-19 ENCOUNTER — LAB (OUTPATIENT)
Dept: LAB | Facility: HOSPITAL | Age: 84
End: 2024-06-19
Payer: MEDICARE

## 2024-06-19 DIAGNOSIS — E11.9 TYPE 2 DIABETES MELLITUS WITHOUT COMPLICATION, WITHOUT LONG-TERM CURRENT USE OF INSULIN: ICD-10-CM

## 2024-06-19 DIAGNOSIS — E78.2 MIXED HYPERLIPIDEMIA: ICD-10-CM

## 2024-06-19 DIAGNOSIS — Z79.899 OTHER LONG TERM (CURRENT) DRUG THERAPY: ICD-10-CM

## 2024-06-19 DIAGNOSIS — E03.9 ACQUIRED HYPOTHYROIDISM: ICD-10-CM

## 2024-06-19 DIAGNOSIS — I10 ESSENTIAL HYPERTENSION: ICD-10-CM

## 2024-06-19 DIAGNOSIS — M1A.0710 CHRONIC IDIOPATHIC GOUT INVOLVING TOE OF RIGHT FOOT WITHOUT TOPHUS: ICD-10-CM

## 2024-06-19 LAB
ALBUMIN UR-MCNC: 5 MG/DL
BACTERIA UR QL AUTO: ABNORMAL /HPF
BILIRUB UR QL STRIP: NEGATIVE
CLARITY UR: ABNORMAL
COLOR UR: YELLOW
GLUCOSE UR STRIP-MCNC: NEGATIVE MG/DL
HGB UR QL STRIP.AUTO: ABNORMAL
KETONES UR QL STRIP: NEGATIVE
LEUKOCYTE ESTERASE UR QL STRIP.AUTO: ABNORMAL
NITRITE UR QL STRIP: POSITIVE
PH UR STRIP.AUTO: 6 [PH] (ref 5–8)
PROT UR QL STRIP: NEGATIVE
RBC # UR STRIP: ABNORMAL /HPF
REF LAB TEST METHOD: ABNORMAL
SP GR UR STRIP: 1.01 (ref 1–1.03)
SQUAMOUS #/AREA URNS HPF: ABNORMAL /HPF
UROBILINOGEN UR QL STRIP: ABNORMAL
WBC # UR STRIP: ABNORMAL /HPF

## 2024-06-19 PROCEDURE — 87186 SC STD MICRODIL/AGAR DIL: CPT | Performed by: FAMILY MEDICINE

## 2024-06-19 PROCEDURE — 87086 URINE CULTURE/COLONY COUNT: CPT | Performed by: FAMILY MEDICINE

## 2024-06-19 PROCEDURE — 81001 URINALYSIS AUTO W/SCOPE: CPT | Performed by: FAMILY MEDICINE

## 2024-06-19 PROCEDURE — 82043 UR ALBUMIN QUANTITATIVE: CPT

## 2024-06-19 PROCEDURE — 87077 CULTURE AEROBIC IDENTIFY: CPT | Performed by: FAMILY MEDICINE

## 2024-06-22 LAB — BACTERIA SPEC AEROBE CULT: ABNORMAL

## 2024-06-25 LAB — BACTERIA SPEC AEROBE CULT: ABNORMAL

## 2024-07-18 ENCOUNTER — TELEPHONE (OUTPATIENT)
Dept: FAMILY MEDICINE CLINIC | Age: 84
End: 2024-07-18
Payer: MEDICARE

## 2024-07-18 NOTE — TELEPHONE ENCOUNTER
Pt called to get a hospital follow up scheduled. She was seen for heart surgery at [facility] and was discharged on 7/13/2024. I have her scheduled with Scott on 7/25 at 2:45 PM.

## 2024-07-25 ENCOUNTER — OFFICE VISIT (OUTPATIENT)
Dept: FAMILY MEDICINE CLINIC | Age: 84
End: 2024-07-25
Payer: MEDICARE

## 2024-07-25 ENCOUNTER — LAB (OUTPATIENT)
Dept: LAB | Facility: HOSPITAL | Age: 84
End: 2024-07-25
Payer: MEDICARE

## 2024-07-25 VITALS
OXYGEN SATURATION: 100 % | BODY MASS INDEX: 25.17 KG/M2 | DIASTOLIC BLOOD PRESSURE: 62 MMHG | HEART RATE: 70 BPM | HEIGHT: 66 IN | WEIGHT: 156.6 LBS | SYSTOLIC BLOOD PRESSURE: 142 MMHG | TEMPERATURE: 97.6 F

## 2024-07-25 DIAGNOSIS — D64.9 ANEMIA, UNSPECIFIED TYPE: ICD-10-CM

## 2024-07-25 DIAGNOSIS — R06.02 SHORTNESS OF BREATH: ICD-10-CM

## 2024-07-25 DIAGNOSIS — E03.9 ACQUIRED HYPOTHYROIDISM: ICD-10-CM

## 2024-07-25 DIAGNOSIS — R30.0 DYSURIA: ICD-10-CM

## 2024-07-25 DIAGNOSIS — Z95.2 S/P AORTIC VALVE REPLACEMENT WITH PROSTHETIC VALVE: Primary | ICD-10-CM

## 2024-07-25 DIAGNOSIS — I25.10 CORONARY ARTERY DISEASE INVOLVING NATIVE CORONARY ARTERY OF NATIVE HEART WITHOUT ANGINA PECTORIS: ICD-10-CM

## 2024-07-25 DIAGNOSIS — Z95.2 S/P AORTIC VALVE REPLACEMENT WITH PROSTHETIC VALVE: ICD-10-CM

## 2024-07-25 LAB
ALBUMIN SERPL-MCNC: 3.8 G/DL (ref 3.5–5.2)
ALBUMIN/GLOB SERPL: 1.5 G/DL
ALP SERPL-CCNC: 108 U/L (ref 39–117)
ALT SERPL W P-5'-P-CCNC: 9 U/L (ref 1–33)
ANION GAP SERPL CALCULATED.3IONS-SCNC: 9.7 MMOL/L (ref 5–15)
AST SERPL-CCNC: 16 U/L (ref 1–32)
BACTERIA UR QL AUTO: ABNORMAL /HPF
BASOPHILS # BLD AUTO: 0.01 10*3/MM3 (ref 0–0.2)
BASOPHILS NFR BLD AUTO: 0.2 % (ref 0–1.5)
BILIRUB SERPL-MCNC: 0.2 MG/DL (ref 0–1.2)
BILIRUB UR QL STRIP: NEGATIVE
BUN SERPL-MCNC: 20 MG/DL (ref 8–23)
BUN/CREAT SERPL: 22.5 (ref 7–25)
CALCIUM SPEC-SCNC: 9.2 MG/DL (ref 8.6–10.5)
CHLORIDE SERPL-SCNC: 102 MMOL/L (ref 98–107)
CLARITY UR: ABNORMAL
CO2 SERPL-SCNC: 24.3 MMOL/L (ref 22–29)
COLOR UR: YELLOW
CREAT SERPL-MCNC: 0.89 MG/DL (ref 0.57–1)
DEPRECATED RDW RBC AUTO: 47.8 FL (ref 37–54)
EGFRCR SERPLBLD CKD-EPI 2021: 63.6 ML/MIN/1.73
EOSINOPHIL # BLD AUTO: 0.08 10*3/MM3 (ref 0–0.4)
EOSINOPHIL NFR BLD AUTO: 1.6 % (ref 0.3–6.2)
ERYTHROCYTE [DISTWIDTH] IN BLOOD BY AUTOMATED COUNT: 14.5 % (ref 12.3–15.4)
FERRITIN SERPL-MCNC: 34.1 NG/ML (ref 13–150)
FOLATE SERPL-MCNC: >20 NG/ML (ref 4.78–24.2)
GLOBULIN UR ELPH-MCNC: 2.5 GM/DL
GLUCOSE SERPL-MCNC: 100 MG/DL (ref 65–99)
GLUCOSE UR STRIP-MCNC: NEGATIVE MG/DL
HCT VFR BLD AUTO: 27.1 % (ref 34–46.6)
HGB BLD-MCNC: 8.5 G/DL (ref 12–15.9)
HGB UR QL STRIP.AUTO: ABNORMAL
HYALINE CASTS UR QL AUTO: ABNORMAL /LPF
IMM GRANULOCYTES # BLD AUTO: 0.01 10*3/MM3 (ref 0–0.05)
IMM GRANULOCYTES NFR BLD AUTO: 0.2 % (ref 0–0.5)
IRON 24H UR-MRATE: 28 MCG/DL (ref 37–145)
IRON SATN MFR SERPL: 8 % (ref 20–50)
KETONES UR QL STRIP: NEGATIVE
LEUKOCYTE ESTERASE UR QL STRIP.AUTO: ABNORMAL
LYMPHOCYTES # BLD AUTO: 0.96 10*3/MM3 (ref 0.7–3.1)
LYMPHOCYTES NFR BLD AUTO: 19.2 % (ref 19.6–45.3)
MCH RBC QN AUTO: 28.1 PG (ref 26.6–33)
MCHC RBC AUTO-ENTMCNC: 31.4 G/DL (ref 31.5–35.7)
MCV RBC AUTO: 89.4 FL (ref 79–97)
MONOCYTES # BLD AUTO: 0.55 10*3/MM3 (ref 0.1–0.9)
MONOCYTES NFR BLD AUTO: 11 % (ref 5–12)
NEUTROPHILS NFR BLD AUTO: 3.39 10*3/MM3 (ref 1.7–7)
NEUTROPHILS NFR BLD AUTO: 67.8 % (ref 42.7–76)
NITRITE UR QL STRIP: NEGATIVE
NT-PROBNP SERPL-MCNC: 1484 PG/ML (ref 0–1800)
PH UR STRIP.AUTO: 5.5 [PH] (ref 5–8)
PLATELET # BLD AUTO: 225 10*3/MM3 (ref 140–450)
PMV BLD AUTO: 10.2 FL (ref 6–12)
POTASSIUM SERPL-SCNC: 4.9 MMOL/L (ref 3.5–5.2)
PROT SERPL-MCNC: 6.3 G/DL (ref 6–8.5)
PROT UR QL STRIP: NEGATIVE
RBC # BLD AUTO: 3.03 10*6/MM3 (ref 3.77–5.28)
RBC # UR STRIP: ABNORMAL /HPF
REF LAB TEST METHOD: ABNORMAL
SODIUM SERPL-SCNC: 136 MMOL/L (ref 136–145)
SP GR UR STRIP: 1.01 (ref 1–1.03)
SQUAMOUS #/AREA URNS HPF: ABNORMAL /HPF
TIBC SERPL-MCNC: 352 MCG/DL (ref 298–536)
TRANSFERRIN SERPL-MCNC: 236 MG/DL (ref 200–360)
TSH SERPL DL<=0.05 MIU/L-ACNC: 0.01 UIU/ML (ref 0.27–4.2)
UROBILINOGEN UR QL STRIP: ABNORMAL
VIT B12 BLD-MCNC: 419 PG/ML (ref 211–946)
WBC # UR STRIP: ABNORMAL /HPF
WBC NRBC COR # BLD AUTO: 5 10*3/MM3 (ref 3.4–10.8)

## 2024-07-25 PROCEDURE — 82746 ASSAY OF FOLIC ACID SERUM: CPT

## 2024-07-25 PROCEDURE — 87186 SC STD MICRODIL/AGAR DIL: CPT | Performed by: FAMILY MEDICINE

## 2024-07-25 PROCEDURE — 3078F DIAST BP <80 MM HG: CPT | Performed by: FAMILY MEDICINE

## 2024-07-25 PROCEDURE — 83540 ASSAY OF IRON: CPT

## 2024-07-25 PROCEDURE — 36415 COLL VENOUS BLD VENIPUNCTURE: CPT

## 2024-07-25 PROCEDURE — 82728 ASSAY OF FERRITIN: CPT

## 2024-07-25 PROCEDURE — 83880 ASSAY OF NATRIURETIC PEPTIDE: CPT

## 2024-07-25 PROCEDURE — 82607 VITAMIN B-12: CPT

## 2024-07-25 PROCEDURE — 1160F RVW MEDS BY RX/DR IN RCRD: CPT | Performed by: FAMILY MEDICINE

## 2024-07-25 PROCEDURE — G2211 COMPLEX E/M VISIT ADD ON: HCPCS | Performed by: FAMILY MEDICINE

## 2024-07-25 PROCEDURE — 1126F AMNT PAIN NOTED NONE PRSNT: CPT | Performed by: FAMILY MEDICINE

## 2024-07-25 PROCEDURE — 80053 COMPREHEN METABOLIC PANEL: CPT

## 2024-07-25 PROCEDURE — 85025 COMPLETE CBC W/AUTO DIFF WBC: CPT

## 2024-07-25 PROCEDURE — 87086 URINE CULTURE/COLONY COUNT: CPT | Performed by: FAMILY MEDICINE

## 2024-07-25 PROCEDURE — 1159F MED LIST DOCD IN RCRD: CPT | Performed by: FAMILY MEDICINE

## 2024-07-25 PROCEDURE — 3077F SYST BP >= 140 MM HG: CPT | Performed by: FAMILY MEDICINE

## 2024-07-25 PROCEDURE — 84443 ASSAY THYROID STIM HORMONE: CPT

## 2024-07-25 PROCEDURE — 99214 OFFICE O/P EST MOD 30 MIN: CPT | Performed by: FAMILY MEDICINE

## 2024-07-25 PROCEDURE — 87077 CULTURE AEROBIC IDENTIFY: CPT | Performed by: FAMILY MEDICINE

## 2024-07-25 PROCEDURE — 81001 URINALYSIS AUTO W/SCOPE: CPT | Performed by: FAMILY MEDICINE

## 2024-07-25 PROCEDURE — 84466 ASSAY OF TRANSFERRIN: CPT

## 2024-07-25 RX ORDER — ASPIRIN 81 MG/1
81 TABLET, CHEWABLE ORAL DAILY
COMMUNITY
Start: 2024-05-30

## 2024-07-25 RX ORDER — CEFDINIR 300 MG/1
300 CAPSULE ORAL 2 TIMES DAILY
Qty: 20 CAPSULE | Refills: 0 | Status: SHIPPED | OUTPATIENT
Start: 2024-07-25

## 2024-07-25 NOTE — PROGRESS NOTES
"Zoila Whipple presents to [facility].    Chief Complaint:  Hospital follow up, aortic valve replacement, shortness of breath, urinary symptoms    Subjective   History of Present Illness:  Zoila Dixon is being seen today for follow-up on her care.  She says that she had aortic valve replacement on 2024.  She was discharged after approximately 48 hours.  She has been back home now for 12 days.  She still does not feel well though.  She says that she is short of breath with exertion.  She felt a little bit better yesterday, but she does not feel as well today.  She is also having urinary symptoms.    It is of note that her hemoglobin level was apparently 7.2 the morning of discharge.  She did not receive any blood while inpatient.    Review of Systems:  Review of Systems   Constitutional:  Positive for chills. Negative for fever.   Respiratory:  Positive for shortness of breath. Negative for cough.    Cardiovascular:  Positive for palpitations. Negative for chest pain.   Gastrointestinal:  Negative for abdominal pain, nausea and vomiting.      Objective   Medical History:  Past Medical History:    Acquired hypothyroidism    Coronary artery disease    Essential hypertension    GERD without esophagitis    H/O aortic valve replacement with porcine valve    Mixed hyperlipidemia    Type 2 diabetes mellitus     Past Surgical History:    AORTIC VALVE REPAIR/REPLACEMENT    APPENDECTOMY    CORONARY STENT PLACEMENT    HYSTERECTOMY    INGUINAL HERNIA REPAIR    LAMINECTOMY      Family History   Problem Relation Age of Onset    No Known Problems Mother         Mother  94 years old \"old age\"    Heart attack Father         Father  49 years old MI     Social History     Tobacco Use    Smoking status: Never    Smokeless tobacco: Never   Substance Use Topics    Alcohol use: Never       Health Maintenance Due   Topic Date Due    TDAP/TD VACCINES (1 - Tdap) Never done    RSV Vaccine - Adults (1 - " 1-dose 60+ series) Never done    ANNUAL WELLNESS VISIT  09/28/2024    MAMMOGRAM  10/25/2024        Immunization History   Administered Date(s) Administered    COVID-19 (MODERNA) 1st,2nd,3rd Dose Monovalent 02/12/2021, 03/12/2021    COVID-19 (PFIZER) Purple Cap Monovalent 12/03/2021    Covid-19 (Pfizer) Gray Cap Monovalent 06/21/2022    Fluzone High Dose =>65 Years ([facility] ONLY) 09/21/2017    Fluzone High-Dose 65+yrs 09/29/2021, 10/20/2022, 09/29/2023    Influenza Quad Vaccine (Inpatient) 10/08/2013    Pneumococcal Conjugate 13-Valent (PCV13) 09/21/2016    Pneumococcal Polysaccharide (PPSV23) 09/21/2017    Shingrix 06/09/2023, 09/05/2023       Allergies   Allergen Reactions    Medrol [Methylprednisolone] Rash    Niacin Rash    Sulfamethoxazole-Trimethoprim Rash        Medications:  Current Outpatient Medications on File Prior to Visit   Medication Sig    allopurinol (ZYLOPRIM) 100 MG tablet Take 1 tablet by mouth Daily.    aspirin 81 MG chewable tablet Chew 1 tablet Daily.    cephalexin (KEFLEX) 250 MG capsule Take 1 capsule by mouth Daily.    cholecalciferol (VITAMIN D3) 25 MCG (1000 UT) tablet Take 1 tablet by mouth Daily.    clopidogrel (Plavix) 75 MG tablet Take 1 tablet by mouth Daily.    levoFLOXacin (Levaquin) 500 MG tablet Take 1 tablet by mouth Daily.    lisinopril (PRINIVIL,ZESTRIL) 20 MG tablet Take 1 tablet by mouth Daily.    metFORMIN ER (GLUCOPHAGE-XR) 500 MG 24 hr tablet Take 2 tablets by mouth Daily With Dinner.    metoprolol tartrate (LOPRESSOR) 25 MG tablet Take 1 tablet by mouth 2 (Two) Times a Day.    multivitamin with minerals tablet tablet Take 1 tablet by mouth Daily.    pantoprazole (PROTONIX) 40 MG EC tablet Take 1 tablet by mouth Daily.    potassium chloride 10 MEQ CR tablet Take 1 tablet by mouth Daily.    rosuvastatin (CRESTOR) 5 MG tablet Take 1 tablet by mouth Daily.    Synthroid 88 MCG tablet Take 1 tablet by mouth Daily.    torsemide (DEMADEX) 20 MG tablet Take 1 tablet by mouth  "Daily.    nystatin (MYCOSTATIN) 100,000 unit/mL suspension SWISH AND SWALLOW 5 ML BY MOUTH FOUR TIMES DAILY (Patient not taking: Reported on 7/25/2024)     No current facility-administered medications on file prior to visit.       Vital Signs:   /62 (BP Location: Left arm, Patient Position: Sitting) Comment: MANUAL  Pulse 70   Temp 97.6 °F (36.4 °C) (Oral)   Ht 166.4 cm (65.51\")   Wt 71 kg (156 lb 9.6 oz)   SpO2 100%   BMI 25.65 kg/m²       Physical Exam:  Physical Exam  Vitals reviewed.   Constitutional:       General: She is not in acute distress.     Appearance: She is ill-appearing (She appears tired.  She is pale including mucosal surfaces.).   Eyes:      Pupils: Pupils are equal, round, and reactive to light.   Neck:      Comments: No thyromegaly  Cardiovascular:      Rate and Rhythm: Normal rate and regular rhythm.      Heart sounds: Murmur heard.   Pulmonary:      Effort: Pulmonary effort is normal.      Breath sounds: Normal breath sounds.   Abdominal:      General: There is no distension.      Palpations: Abdomen is soft.      Tenderness: There is no abdominal tenderness.   Musculoskeletal:      Cervical back: Neck supple.   Lymphadenopathy:      Cervical: No cervical adenopathy.   Skin:     Findings: No lesion or rash.   Neurological:      Mental Status: She is alert.     Result Review   The following data was reviewed by Jewel Chavez MD on 07/25/2024.  Lab Results   Component Value Date    WBC 5.70 03/26/2024    HGB 12.1 03/26/2024    HCT 36.6 03/26/2024    MCV 88.4 03/26/2024     03/26/2024     Lab Results   Component Value Date    GLUCOSE 92 03/26/2024    BUN 20 03/26/2024    CREATININE 0.92 03/26/2024     03/26/2024    K 4.8 03/26/2024     03/26/2024    CO2 27.6 03/26/2024    CALCIUM 9.2 03/26/2024    PROTEINTOT 6.8 03/26/2024    ALBUMIN 4.1 03/26/2024    ALT 16 03/26/2024    AST 19 03/26/2024    ALKPHOS 100 03/26/2024    BILITOT 0.3 03/26/2024    EGFR 61.1 " 03/26/2024    GLOB 2.7 03/26/2024    AGRATIO 1.5 03/26/2024    BCR 21.7 03/26/2024    ANIONGAP 9.4 03/26/2024      Lab Results   Component Value Date    CHOL 126 03/26/2024    CHLPL 119 03/10/2021    TRIG 372 (H) 03/26/2024    HDL 29 (L) 03/26/2024    LDL 42 03/26/2024     Lab Results   Component Value Date    TSH 7.600 (H) 03/26/2024     Lab Results   Component Value Date    HGBA1C 6.20 (H) 03/26/2024          Assessment and Plan:   Today, we have reviewed her care.  Zoila Dixon has not felt well really since the procedure.  I did review her discharge summary from [facility].  There were concerns about anemia at the time of discharge.  I am concerned today by how she feels.  She also has somewhat pale mucosal surfaces.  We will repeat laboratory evaluation as noted.  If her hemoglobin level is below 7, then we probably need to ask her to go back up to [facility] to consider a transfusion.  We may also reach out to cardiology for guidance.  We will see what the test show and then contact her.  Her urine may be infected.  We will cover her with cefdinir for the near term.    Diagnoses and all orders for this visit:    1. S/P aortic valve replacement with prosthetic valve (Primary)  -     CBC Auto Differential; Future    2. Coronary artery disease involving native coronary artery of native heart without angina pectoris  -     Comprehensive Metabolic Panel; Future  -     TSH; Future  -     BNP; Future    3. Anemia, unspecified type  -     CBC Auto Differential; Future  -     Ferritin; Future  -     Iron Profile; Future  -     Vitamin B12 & Folate; Future    4. Shortness of breath  -     TSH; Future  -     BNP; Future    5. Dysuria  -     Urinalysis With Microscopic - Urine, Clean Catch  -     cefdinir (OMNICEF) 300 MG capsule; Take 1 capsule by mouth 2 (Two) Times a Day.  Dispense: 20 capsule; Refill: 0  -     Urine Culture - Urine, Urine, Clean Catch    6. Acquired hypothyroidism  -     TSH; Future    Follow Up  Return in about  3 months (around 10/29/2024) for Recheck, Next scheduled follow up.  Patient was given instructions and counseling regarding her condition or for health maintenance advice. Please see specific information pulled into the AVS if appropriate.

## 2024-07-26 ENCOUNTER — HOSPITAL ENCOUNTER (OUTPATIENT)
Dept: GENERAL RADIOLOGY | Facility: HOSPITAL | Age: 84
Discharge: HOME | End: 2024-07-26
Payer: MEDICARE

## 2024-07-26 DIAGNOSIS — R06.02 SHORTNESS OF BREATH: ICD-10-CM

## 2024-07-26 PROCEDURE — 71046 X-RAY EXAM CHEST 2 VIEWS: CPT

## 2024-07-26 RX ORDER — LEVOTHYROXINE SODIUM 0.07 MG/1
75 TABLET ORAL DAILY
Start: 2024-07-26

## 2024-07-27 LAB — BACTERIA SPEC AEROBE CULT: ABNORMAL

## 2024-07-27 RX ORDER — LEVOFLOXACIN 500 MG/1
500 TABLET, FILM COATED ORAL DAILY
Qty: 10 TABLET | Refills: 0 | Status: SHIPPED | OUTPATIENT
Start: 2024-07-27

## 2024-08-05 ENCOUNTER — LAB (OUTPATIENT)
Dept: LAB | Facility: HOSPITAL | Age: 84
End: 2024-08-05
Payer: MEDICARE

## 2024-08-05 ENCOUNTER — TELEPHONE (OUTPATIENT)
Dept: FAMILY MEDICINE CLINIC | Age: 84
End: 2024-08-05
Payer: MEDICARE

## 2024-08-05 DIAGNOSIS — D64.9 ANEMIA, UNSPECIFIED TYPE: ICD-10-CM

## 2024-08-05 DIAGNOSIS — D64.9 ANEMIA, UNSPECIFIED TYPE: Primary | ICD-10-CM

## 2024-08-05 LAB
BASOPHILS # BLD AUTO: 0.01 10*3/MM3 (ref 0–0.2)
BASOPHILS NFR BLD AUTO: 0.2 % (ref 0–1.5)
DEPRECATED RDW RBC AUTO: 46.3 FL (ref 37–54)
EOSINOPHIL # BLD AUTO: 0.07 10*3/MM3 (ref 0–0.4)
EOSINOPHIL NFR BLD AUTO: 1.4 % (ref 0.3–6.2)
ERYTHROCYTE [DISTWIDTH] IN BLOOD BY AUTOMATED COUNT: 14.4 % (ref 12.3–15.4)
HCT VFR BLD AUTO: 32.3 % (ref 34–46.6)
HGB BLD-MCNC: 10.2 G/DL (ref 12–15.9)
IMM GRANULOCYTES # BLD AUTO: 0.01 10*3/MM3 (ref 0–0.05)
IMM GRANULOCYTES NFR BLD AUTO: 0.2 % (ref 0–0.5)
LYMPHOCYTES # BLD AUTO: 0.92 10*3/MM3 (ref 0.7–3.1)
LYMPHOCYTES NFR BLD AUTO: 19 % (ref 19.6–45.3)
MCH RBC QN AUTO: 27.6 PG (ref 26.6–33)
MCHC RBC AUTO-ENTMCNC: 31.6 G/DL (ref 31.5–35.7)
MCV RBC AUTO: 87.5 FL (ref 79–97)
MONOCYTES # BLD AUTO: 0.69 10*3/MM3 (ref 0.1–0.9)
MONOCYTES NFR BLD AUTO: 14.3 % (ref 5–12)
NEUTROPHILS NFR BLD AUTO: 3.14 10*3/MM3 (ref 1.7–7)
NEUTROPHILS NFR BLD AUTO: 64.9 % (ref 42.7–76)
PLATELET # BLD AUTO: 183 10*3/MM3 (ref 140–450)
PMV BLD AUTO: 10.1 FL (ref 6–12)
RBC # BLD AUTO: 3.69 10*6/MM3 (ref 3.77–5.28)
WBC NRBC COR # BLD AUTO: 4.84 10*3/MM3 (ref 3.4–10.8)

## 2024-08-05 PROCEDURE — 36415 COLL VENOUS BLD VENIPUNCTURE: CPT

## 2024-08-05 PROCEDURE — 85025 COMPLETE CBC W/AUTO DIFF WBC: CPT

## 2024-08-05 NOTE — TELEPHONE ENCOUNTER
----- Message from Whitney PALOMINO sent at 7/26/2024  8:59 AM EDT -----   TICKLE CBC auto differential in 10 days for anemia, unspecified.

## 2024-09-07 DIAGNOSIS — E03.9 ACQUIRED HYPOTHYROIDISM: Primary | ICD-10-CM

## 2024-09-07 NOTE — PROGRESS NOTES
Please let Zoila Dixon know that I had a reminder to review her chart and have placed orders for additional thyroid testing.  It looks like she is scheduled for follow-up with hematology in early October.  I suspect that she will be having blood work done at the time of that visit or just before.  I have printed the order for the thyroid blood work and want her to take the order with her when she goes next for blood work at [facility].  Please forward the order to her or she may .  Let me know if she has other concerns.  Thanks.

## 2024-09-09 DIAGNOSIS — K21.9 GERD WITHOUT ESOPHAGITIS: ICD-10-CM

## 2024-09-09 RX ORDER — PANTOPRAZOLE SODIUM 40 MG/1
40 TABLET, DELAYED RELEASE ORAL DAILY
Qty: 90 TABLET | Refills: 1 | Status: SHIPPED | OUTPATIENT
Start: 2024-09-09

## 2024-09-09 NOTE — TELEPHONE ENCOUNTER
"Caller: Zoila Whipple \"Zoila Dixon\"    Relationship: Self    Best call back number:     [phone]       Requested Prescriptions:   Requested Prescriptions     Pending Prescriptions Disp Refills    pantoprazole (PROTONIX) 40 MG EC tablet 90 tablet 3     Sig: Take 1 tablet by mouth Daily.        Pharmacy where request should be sent: [address] [phone] [facility] [phone]      Last office visit with prescribing clinician: 7/25/2024   Last telemedicine visit with prescribing clinician: Visit date not found   Next office visit with prescribing clinician: 10/29/2024     Additional details provided by patient: COMPLETELY OUT    Does the patient have less than a 3 day supply:  [x] Yes  [] No    Would you like a call back once the refill request has been completed: [x] Yes [] No    If the office needs to give you a call back, can they leave a voicemail: [x] Yes [] No    Alton Fu   09/09/24 09:06 EDT         "

## 2024-10-28 ENCOUNTER — TELEPHONE (OUTPATIENT)
Dept: FAMILY MEDICINE CLINIC | Age: 84
End: 2024-10-28
Payer: MEDICARE

## 2024-10-28 DIAGNOSIS — Z12.31 ENCOUNTER FOR SCREENING MAMMOGRAM FOR BREAST CANCER: Primary | ICD-10-CM

## 2024-11-05 ENCOUNTER — TELEPHONE (OUTPATIENT)
Dept: FAMILY MEDICINE CLINIC | Age: 84
End: 2024-11-05
Payer: MEDICARE

## 2024-11-05 ENCOUNTER — LAB (OUTPATIENT)
Dept: LAB | Facility: HOSPITAL | Age: 84
End: 2024-11-05
Payer: MEDICARE

## 2024-11-05 DIAGNOSIS — E03.9 ACQUIRED HYPOTHYROIDISM: ICD-10-CM

## 2024-11-05 LAB
T4 FREE SERPL-MCNC: 1.59 NG/DL (ref 0.92–1.68)
TSH SERPL DL<=0.05 MIU/L-ACNC: <0.005 UIU/ML (ref 0.27–4.2)

## 2024-11-05 PROCEDURE — 84439 ASSAY OF FREE THYROXINE: CPT

## 2024-11-05 PROCEDURE — 84443 ASSAY THYROID STIM HORMONE: CPT

## 2024-11-11 ENCOUNTER — OFFICE VISIT (OUTPATIENT)
Dept: FAMILY MEDICINE CLINIC | Age: 84
End: 2024-11-11
Payer: MEDICARE

## 2024-11-11 VITALS
OXYGEN SATURATION: 98 % | TEMPERATURE: 97.8 F | HEART RATE: 60 BPM | SYSTOLIC BLOOD PRESSURE: 158 MMHG | WEIGHT: 153.6 LBS | DIASTOLIC BLOOD PRESSURE: 58 MMHG | BODY MASS INDEX: 24.68 KG/M2 | HEIGHT: 66 IN

## 2024-11-11 DIAGNOSIS — Z23 ENCOUNTER FOR IMMUNIZATION: ICD-10-CM

## 2024-11-11 DIAGNOSIS — I10 ESSENTIAL HYPERTENSION: ICD-10-CM

## 2024-11-11 DIAGNOSIS — I63.81 LACUNAR INFARCTION: ICD-10-CM

## 2024-11-11 DIAGNOSIS — H61.21 IMPACTED CERUMEN OF RIGHT EAR: ICD-10-CM

## 2024-11-11 DIAGNOSIS — E78.2 MIXED HYPERLIPIDEMIA: ICD-10-CM

## 2024-11-11 DIAGNOSIS — E11.9 TYPE 2 DIABETES MELLITUS WITHOUT COMPLICATION, WITHOUT LONG-TERM CURRENT USE OF INSULIN: ICD-10-CM

## 2024-11-11 DIAGNOSIS — K21.9 GERD WITHOUT ESOPHAGITIS: ICD-10-CM

## 2024-11-11 DIAGNOSIS — Z00.00 PHYSICAL EXAM: Primary | ICD-10-CM

## 2024-11-11 PROCEDURE — 99214 OFFICE O/P EST MOD 30 MIN: CPT | Performed by: FAMILY MEDICINE

## 2024-11-11 PROCEDURE — 90380 RSV MONOC ANTB SEASN .5ML IM: CPT | Performed by: FAMILY MEDICINE

## 2024-11-11 PROCEDURE — 90662 IIV NO PRSV INCREASED AG IM: CPT | Performed by: FAMILY MEDICINE

## 2024-11-11 PROCEDURE — 1126F AMNT PAIN NOTED NONE PRSNT: CPT | Performed by: FAMILY MEDICINE

## 2024-11-11 PROCEDURE — 3077F SYST BP >= 140 MM HG: CPT | Performed by: FAMILY MEDICINE

## 2024-11-11 PROCEDURE — 3078F DIAST BP <80 MM HG: CPT | Performed by: FAMILY MEDICINE

## 2024-11-11 PROCEDURE — 1159F MED LIST DOCD IN RCRD: CPT | Performed by: FAMILY MEDICINE

## 2024-11-11 PROCEDURE — G0008 ADMIN INFLUENZA VIRUS VAC: HCPCS | Performed by: FAMILY MEDICINE

## 2024-11-11 PROCEDURE — G0439 PPPS, SUBSEQ VISIT: HCPCS | Performed by: FAMILY MEDICINE

## 2024-11-11 PROCEDURE — 1170F FXNL STATUS ASSESSED: CPT | Performed by: FAMILY MEDICINE

## 2024-11-11 PROCEDURE — 69209 REMOVE IMPACTED EAR WAX UNI: CPT | Performed by: FAMILY MEDICINE

## 2024-11-11 PROCEDURE — 1160F RVW MEDS BY RX/DR IN RCRD: CPT | Performed by: FAMILY MEDICINE

## 2024-11-11 RX ORDER — METHENAMINE, SODIUM PHOSPHATE, MONOBASIC, ANHYDROUS, PHENYL SALICYLATE, METHYLENE BLUE AND HYOSCYAMINE SULFATE 118; 40.8; 36; 10; .12 MG/1; MG/1; MG/1; MG/1; MG/1
CAPSULE ORAL DAILY PRN
COMMUNITY

## 2024-11-11 RX ORDER — FERROUS SULFATE 325(65) MG
325 TABLET, DELAYED RELEASE (ENTERIC COATED) ORAL
COMMUNITY

## 2024-11-11 RX ORDER — CLOPIDOGREL BISULFATE 75 MG/1
75 TABLET ORAL DAILY
Qty: 90 TABLET | Refills: 3 | Status: SHIPPED | OUTPATIENT
Start: 2024-11-11

## 2024-11-11 RX ORDER — BRIMONIDINE TARTRATE 1.5 MG/ML
1 SOLUTION/ DROPS OPHTHALMIC 2 TIMES DAILY
COMMUNITY
Start: 2024-09-19

## 2024-11-11 RX ORDER — VALSARTAN 80 MG/1
80 TABLET ORAL DAILY
COMMUNITY
Start: 2024-08-29 | End: 2025-08-29

## 2024-11-11 RX ORDER — METFORMIN HYDROCHLORIDE 500 MG/1
1000 TABLET, EXTENDED RELEASE ORAL
Qty: 180 TABLET | Refills: 0 | Status: SHIPPED | OUTPATIENT
Start: 2024-11-11

## 2024-11-11 RX ORDER — ROSUVASTATIN CALCIUM 5 MG/1
5 TABLET, COATED ORAL DAILY
Qty: 90 TABLET | Refills: 3 | Status: SHIPPED | OUTPATIENT
Start: 2024-11-11

## 2024-11-11 RX ORDER — PANTOPRAZOLE SODIUM 40 MG/1
40 TABLET, DELAYED RELEASE ORAL DAILY
Qty: 90 TABLET | Refills: 3 | Status: SHIPPED | OUTPATIENT
Start: 2024-11-11

## 2024-11-11 RX ORDER — LEVOTHYROXINE SODIUM 75 UG/1
75 TABLET ORAL DAILY
COMMUNITY

## 2024-11-11 RX ORDER — LEVOTHYROXINE SODIUM 100 UG/1
100 TABLET ORAL DAILY
COMMUNITY
Start: 2024-09-11 | End: 2024-11-12

## 2024-11-11 NOTE — PROGRESS NOTES
The ABCs of the Annual Wellness Visit  Subsequent Medicare Wellness Visit    Subjective    Zoila Whipple is a 83 y.o. patient who presents for a Subsequent Medicare Wellness Visit.    The following portions of the patient's history were reviewed and updated as appropriate: allergies, current medications, past family history, past medical history, past social history, past surgical history, and problem list.    Compared to one year ago, the patient feels her physical health is better.    Compared to one year ago, the patient feels her mental health is the same.    Recent Hospitalizations:  She was admitted within the past 365 days at [facility].     Current Medical Providers:  Patient Care Team:  Jewel Chavez MD as PCP - General (Family Medicine)  Sky Bowman APRN (Family Medicine)  Eliel Pavon MD as Consulting Physician (Urology)    Outpatient Medications Prior to Visit   Medication Sig Dispense Refill    ascorbic acid (VITAMIN C) 1000 MG tablet Take 1 tablet by mouth Daily.      aspirin 81 MG chewable tablet Chew 1 tablet Daily.      brimonidine (ALPHAGAN) 0.15 % ophthalmic solution Administer 1 drop to both eyes 2 (Two) Times a Day.      cephalexin (KEFLEX) 250 MG capsule Take 1 capsule by mouth Daily.      cholecalciferol (VITAMIN D3) 25 MCG (1000 UT) tablet Take 1 tablet by mouth Daily.      ferrous sulfate 325 (65 FE) MG EC tablet Take 1 tablet by mouth Every Other Day.      levothyroxine (SYNTHROID, LEVOTHROID) 75 MCG tablet Take 0.5 tablets by mouth Daily.      Meth-Hyo-M Bl-Na Phos-Ph Sal (Uro-MP) 118 MG capsule Take  by mouth Daily As Needed.      metoprolol tartrate (LOPRESSOR) 25 MG tablet Take 1 tablet by mouth 2 (Two) Times a Day.      multivitamin with minerals tablet tablet Take 1 tablet by mouth Daily.      potassium chloride 10 MEQ CR tablet Take 1 tablet by mouth Daily.      torsemide (DEMADEX) 20 MG tablet Take 1 tablet by mouth Daily.      valsartan  (DIOVAN) 80 MG tablet Take 1 tablet by mouth Daily.      clopidogrel (Plavix) 75 MG tablet Take 1 tablet by mouth Daily. 90 tablet 3    metFORMIN ER (GLUCOPHAGE-XR) 500 MG 24 hr tablet Take 2 tablets by mouth Daily With Dinner. 180 tablet 0    rosuvastatin (CRESTOR) 5 MG tablet Take 1 tablet by mouth Daily. 90 tablet 3    levothyroxine (SYNTHROID, LEVOTHROID) 100 MCG tablet Take 1 tablet by mouth Daily.      allopurinol (ZYLOPRIM) 100 MG tablet Take 1 tablet by mouth Daily.      cefdinir (OMNICEF) 300 MG capsule Take 1 capsule by mouth 2 (Two) Times a Day. 20 capsule 0    levoFLOXacin (Levaquin) 500 MG tablet Take 1 tablet by mouth Daily. 10 tablet 0    levothyroxine (Synthroid) 75 MCG tablet Take 1 tablet by mouth Daily.      lisinopril (PRINIVIL,ZESTRIL) 20 MG tablet Take 1 tablet by mouth Daily. (Patient not taking: Reported on 11/11/2024) 90 tablet 3    pantoprazole (PROTONIX) 40 MG EC tablet Take 1 tablet by mouth Daily. (Patient not taking: Reported on 11/11/2024) 90 tablet 1     No facility-administered medications prior to visit.       No opioid medication identified on active medication list. I have reviewed chart for other potential  high risk medication/s and harmful drug interactions in the elderly.      Aspirin is on active medication list. Aspirin use is indicated based on review of current medical condition/s. Pros and cons of this therapy have been discussed today. Benefits of this medication outweigh potential harm.  Patient has been encouraged to continue taking this medication.      Patient Active Problem List   Diagnosis    Type 2 diabetes mellitus    S/P aortic valve replacement with prosthetic valve    Mixed hyperlipidemia    GERD without esophagitis    Acquired hypothyroidism    Essential hypertension    H/O aortic valve replacement with porcine valve    Lacunar infarction    Chronic idiopathic gout involving toe of right foot without tophus     Advance Care Planning  Advance Directive is not  "on file.  ACP discussion was held with the patient during this visit. Patient has an advance directive (not in EMR), copy requested.  Her children would make decisions if needed.     Objective    Vitals:    24 1437   BP: 164/60  Comment: manual   BP Location: Right arm   Patient Position: Sitting   Pulse: 58   Temp: 97.8 °F (36.6 °C)   TempSrc: Oral   SpO2: 98%   Weight: 69.7 kg (153 lb 9.6 oz)   Height: 166.4 cm (65.51\")   PainSc: 0-No pain     Estimated body mass index is 25.16 kg/m² as calculated from the following:    Height as of this encounter: 166.4 cm (65.51\").    Weight as of this encounter: 69.7 kg (153 lb 9.6 oz).    BMI is >= 25 and <30. (Overweight) The following options were offered after discussion;: none (medical contraindication)    Does the patient have evidence of cognitive impairment? No        HEALTH RISK ASSESSMENT    Smoking Status:  Social History     Tobacco Use   Smoking Status Never   Smokeless Tobacco Never     Alcohol Consumption:  Social History     Substance and Sexual Activity   Alcohol Use Never     Fall Risk Screen:    STEADI Fall Risk Assessment was completed, and patient is at LOW risk for falls.Assessment completed on:2024    Depression Screenin/11/2024     2:35 PM   PHQ-2/PHQ-9 Depression Screening   Little interest or pleasure in doing things Not at all   Feeling down, depressed, or hopeless Not at all   How difficult have these problems made it for you to do your work, take care of things at home, or get along with other people? Not difficult at all       Health Habits and Functional and Cognitive Screenin/11/2024     2:35 PM   Functional & Cognitive Status   Do you have difficulty preparing food and eating? No   Do you have difficulty bathing yourself, getting dressed or grooming yourself? No   Do you have difficulty using the toilet? No   Do you have difficulty moving around from place to place? No   Do you have trouble with steps or getting " out of a bed or a chair? No   Current Diet Well Balanced Diet   Dental Exam Other        Dental Exam Comment dentures   Eye Exam Up to date   Exercise (times per week) 0 times per week   Current Exercises Include No Regular Exercise   Do you need help using the phone?  No   Are you deaf or do you have serious difficulty hearing?  No   Do you need help to go to places out of walking distance? No   Do you need help shopping? No   Do you need help preparing meals?  No   Do you need help with housework?  No   Do you need help with laundry? No   Do you need help taking your medications? No   Do you need help managing money? No   Do you ever drive or ride in a car without wearing a seat belt? No   Have you felt unusual stress, anger or loneliness in the last month? No   Who do you live with? Alone   If you need help, do you have trouble finding someone available to you? No   Have you been bothered in the last four weeks by sexual problems? No   Do you have difficulty concentrating, remembering or making decisions? No       Age-appropriate Screening Schedule:  Refer to the list below for future screening recommendations based on patient's age, sex and/or medical conditions. Orders for these recommended tests are listed in the plan section. The patient has been provided with a written plan.    Health Maintenance   Topic Date Due    TDAP/TD VACCINES (1 - Tdap) Never done    RSV Vaccine - Adults (1 - 1-dose 75+ series) Never done    INFLUENZA VACCINE  08/01/2024    MAMMOGRAM  10/25/2024    HEMOGLOBIN A1C  01/10/2025    COVID-19 Vaccine (5 - 2024-25 season) 11/11/2025 (Originally 9/1/2024)    DIABETIC EYE EXAM  01/23/2025    LIPID PANEL  03/26/2025    BMI FOLLOWUP  03/26/2025    URINE MICROALBUMIN  06/19/2025    DXA SCAN  07/24/2025    ANNUAL WELLNESS VISIT  11/11/2025    Pneumococcal Vaccine 65+  Completed    ZOSTER VACCINE  Completed          CMS Preventative Services Quick Reference  Risk Factors Identified During  "Encounter  Hearing Problem:  Continue using hearing aids especially with driving.  Immunizations Discussed/Encouraged: Tdap, Influenza, COVID19, and RSV (Respiratory Syncytial Virus)  The above risks/problems have been discussed with the patient.  Pertinent information has been shared with the patient in the After Visit Summary.  An After Visit Summary and PPPS were made available to the patient.    Follow Up:   Next Medicare Wellness visit to be scheduled in 1 year.     Additional E&M Note during same encounter follows:  Patient has multiple medical problems which are significant and separately identifiable that require additional work above and beyond the Medicare Wellness Visit.      Chief Complaint:  Diabetes, hypertension, cholesterol, thyroid    Subjective    History of Present Illness:  In addition to the Medicare wellness exam, Zoila is also here for follow-up on her usual care.  She has type 2 diabetes for which she remains on metformin.  She checks her blood sugar at home frequently and says it typically runs in the 90s.  She denies significant hypoglycemia.     Other problems include hypertension, elevated cholesterol, and hypothyroidism.  She remains on treatment for these.    Review of Systems:  Review of Systems   Constitutional:  Negative for chills and fever.   Respiratory:  Positive for shortness of breath (most exertion). Negative for cough.    Cardiovascular:  Negative for chest pain and palpitations.   Gastrointestinal:  Negative for abdominal pain, nausea and vomiting.      Objective   Vital Signs:  Vitals:    11/11/24 1437 11/11/24 1525   BP: 164/60  Comment: manual 158/58  Comment: manual   BP Location: Right arm Right arm   Patient Position: Sitting Sitting   Pulse: 58 60   Temp: 97.8 °F (36.6 °C)    TempSrc: Oral    SpO2: 98%    Weight: 69.7 kg (153 lb 9.6 oz)    Height: 166.4 cm (65.51\")    PainSc: 0-No pain    Body mass index is 25.16 kg/m².    Physical Exam  Vitals and nursing note " reviewed.   Constitutional:       General: She is not in acute distress.     Appearance: She is not ill-appearing.   HENT:      Right Ear: Tympanic membrane and ear canal normal.      Left Ear: Tympanic membrane and ear canal normal.      Ears:      Comments: Hearing is normal with forced whisper.     Mouth/Throat:      Mouth: Mucous membranes are moist.      Comments: Pharynx appears normal  Eyes:      Extraocular Movements: Extraocular movements intact.      Pupils: Pupils are equal, round, and reactive to light.      Comments: Binocular vision is 20/25 with correction.   Neck:      Thyroid: No thyromegaly.   Cardiovascular:      Rate and Rhythm: Normal rate and regular rhythm.      Heart sounds: Murmur heard.   Pulmonary:      Effort: Pulmonary effort is normal.      Breath sounds: Normal breath sounds.   Abdominal:      General: There is no distension.      Palpations: Abdomen is soft. There is no mass.      Tenderness: There is no abdominal tenderness.   Musculoskeletal:      Cervical back: Normal range of motion.      Right lower leg: Edema (Trace) present.      Left lower leg: Edema present.   Skin:     Findings: No lesion or rash.   Neurological:      General: No focal deficit present.      Mental Status: She is oriented to person, place, and time.      Cranial Nerves: No cranial nerve deficit.   Psychiatric:         Mood and Affect: Mood normal.   The following data was reviewed by Jewel Chavez MD on 11/11/2024.  Lab Results   Component Value Date    WBC 4.84 08/05/2024    HGB 10.2 (L) 08/05/2024    HCT 32.3 (L) 08/05/2024    MCV 87.5 08/05/2024     08/05/2024     Lab Results   Component Value Date    GLUCOSE 100 (H) 07/25/2024    BUN 20 07/25/2024    CREATININE 0.89 07/25/2024     07/25/2024    K 4.9 07/25/2024     07/25/2024    CALCIUM 9.2 07/25/2024    PROTEINTOT 6.3 07/25/2024    ALBUMIN 3.8 07/25/2024    ALT 9 07/25/2024    AST 16 07/25/2024    ALKPHOS 108 07/25/2024     BILITOT 0.2 07/25/2024    GLOB 2.5 07/25/2024    AGRATIO 1.5 07/25/2024    BCR 22.5 07/25/2024    ANIONGAP 9.7 07/25/2024    EGFR 63.6 07/25/2024     Lab Results   Component Value Date    CHOL 126 03/26/2024    CHLPL 119 03/10/2021    TRIG 372 (H) 03/26/2024    HDL 29 (L) 03/26/2024    LDL 42 03/26/2024     Lab Results   Component Value Date    TSH <0.005 (L) 11/05/2024     Lab Results   Component Value Date    HGBA1C 6.20 (H) 03/26/2024      Ear Cerumen Removal    Date/Time: 11/11/2024 3:27 PM    Performed by: Breann Chandra RN  Authorized by: Jewel Chavez MD    Anesthesia:  Local Anesthetic: none  Location details: right ear  Patient tolerance: patient tolerated the procedure well with no immediate complications  Procedure type: irrigation   Sedation:  Patient sedated: no            Assessment and Plan:   Today, we have reviewed her care.  Overall, Zoila Dixon seems well.  Regarding the Medicare wellness exam, she has aged out of most routine cancer screenings.  She is planning to do mammogram in December.  We also reviewed vaccines today and will move ahead with flu shot.  She declines COVID-19 booster.  Other vaccines have been reviewed as above.    Regarding her usual care, her blood pressure is elevated, and we will recheck it.  Most of that care is being managed by cardiology though.  Otherwise, we will refill her medications.  There is some uncertainty regarding the thyroid medication.  I believe that she is taking both 100 mcg and 37.5 mcg of levothyroxine.  This would be a significantly higher dose than we directed.  We will clarify this with her by telephone tomorrow morning and move forward from there.  I anticipate rechecking thyroid functions and probably other blood work in about a month.  Tentative follow-up with me will be again in 6 months, sooner if needed.    Diagnoses and all orders for this visit:    1. Physical exam (Primary)    2. Type 2 diabetes mellitus without complication, without  long-term current use of insulin  -     metFORMIN ER (GLUCOPHAGE-XR) 500 MG 24 hr tablet; Take 2 tablets by mouth Daily With Dinner.  Dispense: 180 tablet; Refill: 0    3. Essential hypertension  Comments:  As above.    4. Mixed hyperlipidemia  -     rosuvastatin (CRESTOR) 5 MG tablet; Take 1 tablet by mouth Daily.  Dispense: 90 tablet; Refill: 3    5. Lacunar infarction  -     clopidogrel (Plavix) 75 MG tablet; Take 1 tablet by mouth Daily.  Dispense: 90 tablet; Refill: 3    6. GERD without esophagitis  -     pantoprazole (PROTONIX) 40 MG EC tablet; Take 1 tablet by mouth Daily.  Dispense: 90 tablet; Refill: 3    7. Impacted cerumen of right ear  -     Ear Cerumen Removal    8. Encounter for immunization  -     Fluzone High-Dose 65+yrs       Follow Up  Return in about 6 months (around 5/11/2025) for Recheck, Next scheduled follow up.  Patient was given instructions and counseling regarding her condition or for health maintenance advice. Please see specific information pulled into the AVS if appropriate.

## 2024-11-11 NOTE — Clinical Note
Please call her tomorrow morning and review her thyroid medication with her.  Confirm exactly what she is taking currently.  I suspect that she is taking too much medication based on what she tells me.  She may be taking both a 100 mcg tablet and either 75 or 37.5.  Thanks.

## 2024-11-11 NOTE — Clinical Note
Please reach out to [facility] locally.  She had an echocardiogram last week, and they should be able to fax us the report I believe.  If not, then reach out to [facility] forward.  Thanks.

## 2024-11-12 RX ORDER — LEVOTHYROXINE SODIUM 75 UG/1
75 TABLET ORAL DAILY
Qty: 90 TABLET | Refills: 1 | Status: SHIPPED | OUTPATIENT
Start: 2024-11-12

## 2024-11-12 NOTE — PROGRESS NOTES
Noted.  I would recommend she STOP the 100 mcg dose.  She should change the 75 mcg tablet to a whole tablet daily.  So, her new total daily dose should be 75 mcg daily.  Please TICKLE for me to review her chart after 1/1/2025 for possible repeat labs.  Thanks.

## 2024-12-03 ENCOUNTER — HOSPITAL ENCOUNTER (OUTPATIENT)
Dept: MAMMOGRAPHY | Facility: HOSPITAL | Age: 84
Discharge: HOME | End: 2024-12-03
Admitting: FAMILY MEDICINE
Payer: MEDICARE

## 2024-12-03 DIAGNOSIS — Z12.31 ENCOUNTER FOR SCREENING MAMMOGRAM FOR BREAST CANCER: ICD-10-CM

## 2024-12-03 PROCEDURE — 77067 SCR MAMMO BI INCL CAD: CPT

## 2024-12-03 PROCEDURE — 77063 BREAST TOMOSYNTHESIS BI: CPT

## 2024-12-04 DIAGNOSIS — E11.9 TYPE 2 DIABETES MELLITUS WITHOUT COMPLICATION, WITHOUT LONG-TERM CURRENT USE OF INSULIN: Primary | ICD-10-CM

## 2024-12-04 DIAGNOSIS — E03.9 ACQUIRED HYPOTHYROIDISM: ICD-10-CM

## 2024-12-04 DIAGNOSIS — I10 ESSENTIAL HYPERTENSION: ICD-10-CM

## 2024-12-04 NOTE — PROGRESS NOTES
Please let Zoila Dixon know that I have placed orders for blood work to be done at her convenience.  I want to follow-up on her thyroid and recheck an A1c.  She does not need to be fasting for these labs.  Let me know if she has other concerns.  Thanks.

## 2024-12-06 ENCOUNTER — LAB (OUTPATIENT)
Dept: LAB | Facility: HOSPITAL | Age: 84
End: 2024-12-06
Payer: MEDICARE

## 2024-12-06 DIAGNOSIS — E03.9 ACQUIRED HYPOTHYROIDISM: ICD-10-CM

## 2024-12-06 DIAGNOSIS — I10 ESSENTIAL HYPERTENSION: ICD-10-CM

## 2024-12-06 DIAGNOSIS — E11.9 TYPE 2 DIABETES MELLITUS WITHOUT COMPLICATION, WITHOUT LONG-TERM CURRENT USE OF INSULIN: ICD-10-CM

## 2024-12-06 LAB
ALBUMIN SERPL-MCNC: 4 G/DL (ref 3.5–5.2)
ALBUMIN/GLOB SERPL: 1.4 G/DL
ALP SERPL-CCNC: 136 U/L (ref 39–117)
ALT SERPL W P-5'-P-CCNC: 14 U/L (ref 1–33)
ANION GAP SERPL CALCULATED.3IONS-SCNC: 8 MMOL/L (ref 5–15)
AST SERPL-CCNC: 20 U/L (ref 1–32)
BILIRUB SERPL-MCNC: 0.3 MG/DL (ref 0–1.2)
BUN SERPL-MCNC: 19 MG/DL (ref 8–23)
BUN/CREAT SERPL: 21.1 (ref 7–25)
CALCIUM SPEC-SCNC: 9.2 MG/DL (ref 8.6–10.5)
CHLORIDE SERPL-SCNC: 103 MMOL/L (ref 98–107)
CO2 SERPL-SCNC: 26 MMOL/L (ref 22–29)
CREAT SERPL-MCNC: 0.9 MG/DL (ref 0.57–1)
EGFRCR SERPLBLD CKD-EPI 2021: 62.4 ML/MIN/1.73
GLOBULIN UR ELPH-MCNC: 2.8 GM/DL
GLUCOSE SERPL-MCNC: 96 MG/DL (ref 65–99)
HBA1C MFR BLD: 5.8 % (ref 4.8–5.6)
POTASSIUM SERPL-SCNC: 4.9 MMOL/L (ref 3.5–5.2)
PROT SERPL-MCNC: 6.8 G/DL (ref 6–8.5)
SODIUM SERPL-SCNC: 137 MMOL/L (ref 136–145)
T4 FREE SERPL-MCNC: 1.07 NG/DL (ref 0.92–1.68)
TSH SERPL DL<=0.05 MIU/L-ACNC: 0.59 UIU/ML (ref 0.27–4.2)

## 2024-12-06 PROCEDURE — 84439 ASSAY OF FREE THYROXINE: CPT

## 2024-12-06 PROCEDURE — 36415 COLL VENOUS BLD VENIPUNCTURE: CPT

## 2024-12-06 PROCEDURE — 80053 COMPREHEN METABOLIC PANEL: CPT

## 2024-12-06 PROCEDURE — 83036 HEMOGLOBIN GLYCOSYLATED A1C: CPT

## 2024-12-06 PROCEDURE — 84443 ASSAY THYROID STIM HORMONE: CPT

## 2024-12-07 RX ORDER — LEVOTHYROXINE SODIUM 75 UG/1
37.5 TABLET ORAL DAILY
Start: 2024-12-07

## 2024-12-09 ENCOUNTER — TELEPHONE (OUTPATIENT)
Dept: FAMILY MEDICINE CLINIC | Age: 84
End: 2024-12-09
Payer: MEDICARE

## 2024-12-09 ENCOUNTER — CLINICAL SUPPORT (OUTPATIENT)
Dept: FAMILY MEDICINE CLINIC | Age: 84
End: 2024-12-09
Payer: MEDICARE

## 2024-12-09 VITALS — SYSTOLIC BLOOD PRESSURE: 122 MMHG | HEART RATE: 65 BPM | DIASTOLIC BLOOD PRESSURE: 55 MMHG

## 2024-12-09 DIAGNOSIS — R07.0 THROAT PAIN: Primary | ICD-10-CM

## 2024-12-09 NOTE — TELEPHONE ENCOUNTER
Pt states she had discussed with you previously mouth/throat pain she was having and saw dentist, but they found nothing. She said you mentioned referral to ENT. She would like to move forward with that. Order pended.

## 2024-12-11 DIAGNOSIS — E03.9 ACQUIRED HYPOTHYROIDISM: ICD-10-CM

## 2024-12-11 RX ORDER — LEVOTHYROXINE SODIUM 100 UG/1
100 TABLET ORAL
Qty: 90 TABLET | Refills: 3 | OUTPATIENT
Start: 2024-12-11

## 2024-12-11 NOTE — PROGRESS NOTES
Vitals:    12/09/24 1507   BP: 122/55   BP Location: Left arm   Patient Position: Sitting   Cuff Size: Adult   Pulse: 65     Good morning, Zoila Destiny.  I hope you are well.  Your blood pressure on Monday was in a good range.  I would advise no change in care based on that result.  Let me know if you have other concerns.  I hope you have a good Christmas season.    Jewel Chavez MD  [facility]    PS - MyChart messages are not reviewed on an urgent basis.  It may be several days before we review and/or respond to your message.  If you have an urgent need, please call the office at [phone].

## 2024-12-30 ENCOUNTER — OFFICE VISIT (OUTPATIENT)
Dept: FAMILY MEDICINE CLINIC | Age: 84
End: 2024-12-30
Payer: MEDICARE

## 2024-12-30 VITALS
OXYGEN SATURATION: 97 % | HEART RATE: 63 BPM | WEIGHT: 151.4 LBS | DIASTOLIC BLOOD PRESSURE: 55 MMHG | SYSTOLIC BLOOD PRESSURE: 147 MMHG | HEIGHT: 66 IN | BODY MASS INDEX: 24.33 KG/M2 | TEMPERATURE: 98.5 F

## 2024-12-30 DIAGNOSIS — R05.1 ACUTE COUGH: ICD-10-CM

## 2024-12-30 DIAGNOSIS — R09.82 POST-NASAL DRIP: ICD-10-CM

## 2024-12-30 DIAGNOSIS — J06.9 ACUTE URI: Primary | ICD-10-CM

## 2024-12-30 DIAGNOSIS — I10 ESSENTIAL HYPERTENSION: ICD-10-CM

## 2024-12-30 LAB
EXPIRATION DATE: NORMAL
EXPIRATION DATE: NORMAL
FLUAV AG NPH QL: NEGATIVE
FLUBV AG NPH QL: NEGATIVE
INTERNAL CONTROL: NORMAL
INTERNAL CONTROL: NORMAL
Lab: NORMAL
Lab: NORMAL
SARS-COV-2 AG UPPER RESP QL IA.RAPID: NOT DETECTED

## 2024-12-30 PROCEDURE — 99213 OFFICE O/P EST LOW 20 MIN: CPT

## 2024-12-30 PROCEDURE — 1160F RVW MEDS BY RX/DR IN RCRD: CPT

## 2024-12-30 PROCEDURE — 87804 INFLUENZA ASSAY W/OPTIC: CPT

## 2024-12-30 PROCEDURE — 87426 SARSCOV CORONAVIRUS AG IA: CPT

## 2024-12-30 PROCEDURE — 1126F AMNT PAIN NOTED NONE PRSNT: CPT

## 2024-12-30 PROCEDURE — 1159F MED LIST DOCD IN RCRD: CPT

## 2024-12-30 RX ORDER — AZELASTINE 1 MG/ML
2 SPRAY, METERED NASAL 2 TIMES DAILY
Qty: 30 ML | Refills: 0 | Status: SHIPPED | OUTPATIENT
Start: 2024-12-30

## 2024-12-30 NOTE — ASSESSMENT & PLAN NOTE
Systolic blood pressure elevated today, improved on recheck.  Avoid decongestants, follow-up with PCP if remains elevated.

## 2024-12-30 NOTE — PROGRESS NOTES
"Subjective     CHIEF COMPLAINT    Chief Complaint   Patient presents with    Cough     X 2 days.     Nasal Congestion    Headache     History of Present Illness  Patient is an 84-year-old female, presenting to the clinic today with complaints of cough, congestion/rhinorrhea, body aches and headaches.  She also had a hoarse voice this morning.  Denies any known exposure to any illnesses.  No fever, shortness of breath, wheezing or chest pain.  She has been taking Mucinex.  Cough is somewhat productive.    Review of Systems   Constitutional:  Negative for chills and fever.   HENT:  Positive for congestion and rhinorrhea.    Respiratory:  Positive for cough. Negative for shortness of breath and wheezing.    Cardiovascular:  Negative for chest pain.   Musculoskeletal:  Positive for myalgias.   Neurological:  Positive for headaches.       Past Medical History:   Diagnosis Date    Acquired hypothyroidism     Coronary artery disease     Essential hypertension     GERD without esophagitis     H/O aortic valve replacement with porcine valve     Mixed hyperlipidemia     Type 2 diabetes mellitus        Past Surgical History:   Procedure Laterality Date    AORTIC VALVE REPAIR/REPLACEMENT      APPENDECTOMY      CORONARY STENT PLACEMENT      HYSTERECTOMY      INGUINAL HERNIA REPAIR Right     LAMINECTOMY              Family History   Problem Relation Age of Onset    No Known Problems Mother         Mother  94 years old \"old age\"    Heart attack Father         Father  49 years old [place]            Social History     Socioeconomic History    Marital status:     Number of children: 5   Tobacco Use    Smoking status: Never    Smokeless tobacco: Never   Vaping Use    Vaping status: Never Used   Substance and Sexual Activity    Alcohol use: Never    Drug use: Never    Sexual activity: Defer            Allergies   Allergen Reactions    Medrol [Methylprednisolone] Rash    Niacin Rash    Sulfamethoxazole-Trimethoprim Rash " "           Current Outpatient Medications on File Prior to Visit   Medication Sig Dispense Refill    ascorbic acid (VITAMIN C) 1000 MG tablet Take 1 tablet by mouth Daily.      aspirin 81 MG chewable tablet Chew 1 tablet Daily.      brimonidine (ALPHAGAN) 0.15 % ophthalmic solution Administer 1 drop to both eyes 2 (Two) Times a Day.      cephalexin (KEFLEX) 250 MG capsule Take 1 capsule by mouth Daily.      cholecalciferol (VITAMIN D3) 25 MCG (1000 UT) tablet Take 1 tablet by mouth Daily.      clopidogrel (Plavix) 75 MG tablet Take 1 tablet by mouth Daily. 90 tablet 3    ferrous sulfate 325 (65 FE) MG EC tablet Take 1 tablet by mouth Every Other Day.      levothyroxine (Synthroid) 75 MCG tablet Take 0.5 tablets by mouth Daily.      metFORMIN ER (GLUCOPHAGE-XR) 500 MG 24 hr tablet Take 2 tablets by mouth Daily With Dinner. 180 tablet 0    Meth-Hyo-M Bl-Na Phos-Ph Sal (Uro-MP) 118 MG capsule Take  by mouth Daily As Needed.      metoprolol tartrate (LOPRESSOR) 25 MG tablet Take 1 tablet by mouth 2 (Two) Times a Day.      multivitamin with minerals tablet tablet Take 1 tablet by mouth Daily.      pantoprazole (PROTONIX) 40 MG EC tablet Take 1 tablet by mouth Daily. 90 tablet 3    potassium chloride 10 MEQ CR tablet Take 1 tablet by mouth Daily.      rosuvastatin (CRESTOR) 5 MG tablet Take 1 tablet by mouth Daily. 90 tablet 3    torsemide (DEMADEX) 20 MG tablet Take 1 tablet by mouth Daily.      valsartan (DIOVAN) 80 MG tablet Take 1 tablet by mouth Daily.       No current facility-administered medications on file prior to visit.       /55   Pulse 63   Temp 98.5 °F (36.9 °C) (Oral)   Ht 166.4 cm (65.51\")   Wt 68.7 kg (151 lb 6.4 oz)   SpO2 97% Comment: room air  BMI 24.80 kg/m²       Objective     Physical Exam  Vitals and nursing note reviewed.   Constitutional:       General: She is not in acute distress.     Appearance: Normal appearance. She is not ill-appearing.   HENT:      Head: Normocephalic.      " Right Ear: Tympanic membrane, ear canal and external ear normal.      Left Ear: Tympanic membrane, ear canal and external ear normal.      Nose: Nose normal.      Right Sinus: No maxillary sinus tenderness or frontal sinus tenderness.      Left Sinus: No maxillary sinus tenderness or frontal sinus tenderness.      Mouth/Throat:      Lips: Pink.      Mouth: Mucous membranes are moist.      Pharynx: Oropharynx is clear. Uvula midline. No pharyngeal swelling, oropharyngeal exudate, posterior oropharyngeal erythema or uvula swelling.   Eyes:      Pupils: Pupils are equal, round, and reactive to light.   Cardiovascular:      Rate and Rhythm: Normal rate and regular rhythm.      Heart sounds: Normal heart sounds. No murmur heard.  Pulmonary:      Effort: Pulmonary effort is normal. No accessory muscle usage or respiratory distress.      Breath sounds: Normal breath sounds. No wheezing or rhonchi.   Musculoskeletal:      Cervical back: Normal range of motion.   Lymphadenopathy:      Cervical: No cervical adenopathy.   Skin:     General: Skin is warm and dry.   Neurological:      General: No focal deficit present.      Mental Status: She is alert and oriented to person, place, and time.   Psychiatric:         Mood and Affect: Mood and affect normal.         Behavior: Behavior normal.           Lab Results (last 24 hours)       Procedure Component Value Units Date/Time    POCT Influenza A/B [[id number]] Collected: 12/30/24 1343    Specimen: Swab Updated: 12/30/24 1343     Rapid Influenza A Ag Negative     Rapid Influenza B Ag Negative     Internal Control Passed     Lot Number [id number]     Expiration Date 7-11-25    POCT SARS-CoV-2 Antigen TIN [[id number]] Collected: 12/30/24 1343    Specimen: Swab Updated: 12/30/24 1344     SARS Antigen Not Detected     Internal Control Passed     Lot Number [id number]     Expiration Date 1-29-25          Assessment & Plan  Acute URI  Patient is negative for COVID and flu on rapid testing today.   No evidence of bacterial infection on exam.  Suspect viral illness.  Recommend symptomatic treatment which was discussed with patient.  Increase fluid intake, rest.  Continue Mucinex.  Astelin sent to pharmacy.  Return to clinic if symptoms worsen or do not improve.       Acute cough    Orders:    POCT Influenza A/B    POCT SARS-CoV-2 Antigen TIN    Post-nasal drip    Orders:    azelastine (ASTELIN) 0.1 % nasal spray; Administer 2 sprays in each nostril as directed by provider 2 (Two) Times a Day.    Essential hypertension  Systolic blood pressure elevated today, improved on recheck.  Avoid decongestants, follow-up with PCP if remains elevated.            Follow up:  Return if symptoms worsen or fail to improve.  Patient was given instructions and counseling regarding her condition or for health maintenance advice. Please see specific information pulled into the AVS if appropriate.

## 2025-01-03 ENCOUNTER — OFFICE VISIT (OUTPATIENT)
Dept: FAMILY MEDICINE CLINIC | Age: 85
End: 2025-01-03
Payer: MEDICARE

## 2025-01-03 ENCOUNTER — LAB (OUTPATIENT)
Dept: LAB | Facility: HOSPITAL | Age: 85
End: 2025-01-03
Payer: MEDICARE

## 2025-01-03 ENCOUNTER — HOSPITAL ENCOUNTER (OUTPATIENT)
Dept: GENERAL RADIOLOGY | Facility: HOSPITAL | Age: 85
Discharge: HOME | End: 2025-01-03
Payer: MEDICARE

## 2025-01-03 VITALS
DIASTOLIC BLOOD PRESSURE: 60 MMHG | HEART RATE: 60 BPM | HEIGHT: 66 IN | SYSTOLIC BLOOD PRESSURE: 148 MMHG | OXYGEN SATURATION: 98 % | WEIGHT: 152.6 LBS | BODY MASS INDEX: 24.53 KG/M2 | TEMPERATURE: 97.8 F

## 2025-01-03 DIAGNOSIS — R74.8 ELEVATED ALKALINE PHOSPHATASE LEVEL: ICD-10-CM

## 2025-01-03 DIAGNOSIS — E78.2 MIXED HYPERLIPIDEMIA: ICD-10-CM

## 2025-01-03 DIAGNOSIS — M53.3 SACRAL PAIN: ICD-10-CM

## 2025-01-03 DIAGNOSIS — Z79.899 OTHER LONG TERM (CURRENT) DRUG THERAPY: ICD-10-CM

## 2025-01-03 DIAGNOSIS — E03.9 ACQUIRED HYPOTHYROIDISM: ICD-10-CM

## 2025-01-03 DIAGNOSIS — Z95.2 S/P AORTIC VALVE REPLACEMENT WITH PROSTHETIC VALVE: ICD-10-CM

## 2025-01-03 DIAGNOSIS — I10 ESSENTIAL HYPERTENSION: ICD-10-CM

## 2025-01-03 DIAGNOSIS — R55 NEAR SYNCOPE: ICD-10-CM
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
## 2025-01-03 LAB
ALBUMIN SERPL-MCNC: 3.5 G/DL (ref 3.5–5.2)
ALBUMIN/GLOB SERPL: 1 G/DL
ALP SERPL-CCNC: 141 U/L (ref 39–117)
ALT SERPL W P-5'-P-CCNC: 11 U/L (ref 1–33)
ANION GAP SERPL CALCULATED.3IONS-SCNC: 10.2 MMOL/L (ref 5–15)
AST SERPL-CCNC: 18 U/L (ref 1–32)
BASOPHILS # BLD AUTO: 0.01 10*3/MM3 (ref 0–0.2)
BASOPHILS NFR BLD AUTO: 0.2 % (ref 0–1.5)
BILIRUB SERPL-MCNC: 0.3 MG/DL (ref 0–1.2)
BUN SERPL-MCNC: 17 MG/DL (ref 8–23)
BUN/CREAT SERPL: 21.8 (ref 7–25)
CALCIUM SPEC-SCNC: 9.2 MG/DL (ref 8.6–10.5)
CHLORIDE SERPL-SCNC: 106 MMOL/L (ref 98–107)
CO2 SERPL-SCNC: 25.8 MMOL/L (ref 22–29)
CREAT SERPL-MCNC: 0.78 MG/DL (ref 0.57–1)
DEPRECATED RDW RBC AUTO: 50.8 FL (ref 37–54)
EGFRCR SERPLBLD CKD-EPI 2021: 74.1 ML/MIN/1.73
EOSINOPHIL # BLD AUTO: 0.2 10*3/MM3 (ref 0–0.4)
EOSINOPHIL NFR BLD AUTO: 3.6 % (ref 0.3–6.2)
ERYTHROCYTE [DISTWIDTH] IN BLOOD BY AUTOMATED COUNT: 15.6 % (ref 12.3–15.4)
GLOBULIN UR ELPH-MCNC: 3.6 GM/DL
GLUCOSE SERPL-MCNC: 130 MG/DL (ref 65–99)
HCT VFR BLD AUTO: 33.4 % (ref 34–46.6)
HGB BLD-MCNC: 10.4 G/DL (ref 12–15.9)
IMM GRANULOCYTES # BLD AUTO: 0.02 10*3/MM3 (ref 0–0.05)
IMM GRANULOCYTES NFR BLD AUTO: 0.4 % (ref 0–0.5)
LYMPHOCYTES # BLD AUTO: 1.27 10*3/MM3 (ref 0.7–3.1)
LYMPHOCYTES NFR BLD AUTO: 23 % (ref 19.6–45.3)
MAGNESIUM SERPL-MCNC: 1.9 MG/DL (ref 1.6–2.4)
MCH RBC QN AUTO: 27.3 PG (ref 26.6–33)
MCHC RBC AUTO-ENTMCNC: 31.1 G/DL (ref 31.5–35.7)
MCV RBC AUTO: 87.7 FL (ref 79–97)
MONOCYTES # BLD AUTO: 0.57 10*3/MM3 (ref 0.1–0.9)
MONOCYTES NFR BLD AUTO: 10.3 % (ref 5–12)
NEUTROPHILS NFR BLD AUTO: 3.44 10*3/MM3 (ref 1.7–7)
NEUTROPHILS NFR BLD AUTO: 62.5 % (ref 42.7–76)
PLATELET # BLD AUTO: 173 10*3/MM3 (ref 140–450)
PMV BLD AUTO: 10.4 FL (ref 6–12)
POTASSIUM SERPL-SCNC: 4.8 MMOL/L (ref 3.5–5.2)
PROT SERPL-MCNC: 7.1 G/DL (ref 6–8.5)
RBC # BLD AUTO: 3.81 10*6/MM3 (ref 3.77–5.28)
SODIUM SERPL-SCNC: 142 MMOL/L (ref 136–145)
TSH SERPL DL<=0.05 MIU/L-ACNC: 1.46 UIU/ML (ref 0.27–4.2)
WBC NRBC COR # BLD AUTO: 5.51 10*3/MM3 (ref 3.4–10.8)

## 2025-01-03 PROCEDURE — 82977 ASSAY OF GGT: CPT

## 2025-01-03 PROCEDURE — 85025 COMPLETE CBC W/AUTO DIFF WBC: CPT

## 2025-01-03 PROCEDURE — 72220 X-RAY EXAM SACRUM TAILBONE: CPT

## 2025-01-03 PROCEDURE — 80053 COMPREHEN METABOLIC PANEL: CPT

## 2025-01-03 PROCEDURE — 36415 COLL VENOUS BLD VENIPUNCTURE: CPT

## 2025-01-03 PROCEDURE — 83735 ASSAY OF MAGNESIUM: CPT

## 2025-01-03 PROCEDURE — 84443 ASSAY THYROID STIM HORMONE: CPT

## 2025-01-03 RX ORDER — LEVOTHYROXINE SODIUM 75 UG/1
75 TABLET ORAL DAILY
Qty: 90 TABLET | Refills: 3 | Status: SHIPPED | OUTPATIENT
Start: 2025-01-03

## 2025-01-03 NOTE — PROGRESS NOTES
"Zoila Whipple presents to [facility].    Chief Complaint:  Respiratory symptoms, falls    Subjective   History of Present Illness:  Zoila Dixon is being seen today for follow-up on her care.  She was seen here last week for evaluation for upper respiratory symptoms.  She had negative rapid testing for COVID and flu at that time.  However, she has continued to have some upper respiratory congestion.  She is seeing improvement from that though.  She is also concerned because she has had 2 falls.  She fell 6 days ago and then again 2 days ago.  She is unsure why she fell.  She says that she was taking her pajamas off when 1 of these episodes occurred.  She fell backwards at that time.  She was leaning over putting some dishes away the other time.  She is really not sure what happened.  She did not pass out.  She says that she is sore from where she fell.  She has tenderness of the sacral area.  She also says that she has some bruising of the left arm and bumps of the scalp.  She did not lose consciousness though.    Review of Systems:  Review of Systems   Constitutional:  Negative for chills and fever.   HENT:  Positive for congestion.    Respiratory:  Positive for cough. Negative for shortness of breath.    Cardiovascular:  Negative for chest pain and palpitations.   Gastrointestinal:  Negative for abdominal pain, nausea and vomiting.        Objective   Medical History:  Past Medical History:    Acquired hypothyroidism    Coronary artery disease    Essential hypertension    GERD without esophagitis    H/O aortic valve replacement with porcine valve    Mixed hyperlipidemia    Type 2 diabetes mellitus     Past Surgical History:    AORTIC VALVE REPAIR/REPLACEMENT    APPENDECTOMY    CORONARY STENT PLACEMENT    HYSTERECTOMY    INGUINAL HERNIA REPAIR    LAMINECTOMY      Family History   Problem Relation Age of Onset    No Known Problems Mother         Mother  94 years old \"old age\"    Heart " attack Father         Father  49 years old MI     Social History     Tobacco Use    Smoking status: Never    Smokeless tobacco: Never   Substance Use Topics    Alcohol use: Never       Health Maintenance Due   Topic Date Due    TDAP/TD VACCINES (1 - Tdap) Never done    DIABETIC FOOT EXAM  2022    DIABETIC EYE EXAM  2025        Immunization History   Administered Date(s) Administered    ABRYSVO (RSV, 60+ or pregnant women 32-36 wks) 2024    COVID-19 (MODERNA) 1st,2nd,3rd Dose Monovalent 2021, 2021    COVID-19 (PFIZER) Purple Cap Monovalent 2021    Covid-19 (Pfizer) Gray Cap Monovalent 2022    Fluzone  >6mos 10/08/2013    Fluzone High-Dose 65+YRS 2017, 2024    Fluzone High-Dose 65+yrs 2021, 10/20/2022, 2023    Pneumococcal Conjugate 13-Valent (PCV13) 2016    Pneumococcal Polysaccharide (PPSV23) 2017    Shingrix 2023, 2023       Allergies   Allergen Reactions    Medrol [Methylprednisolone] Rash    Niacin Rash    Sulfamethoxazole-Trimethoprim Rash        Medications:    Current Outpatient Medications:     ascorbic acid (VITAMIN C) 1000 MG tablet, Take 1 tablet by mouth Daily., Disp: , Rfl:     aspirin 81 MG chewable tablet, Chew 1 tablet Daily., Disp: , Rfl:     azelastine (ASTELIN) 0.1 % nasal spray, Administer 2 sprays in each nostril as directed by provider 2 (Two) Times a Day., Disp: 30 mL, Rfl: 0    brimonidine (ALPHAGAN) 0.15 % ophthalmic solution, Administer 1 drop to both eyes 2 (Two) Times a Day., Disp: , Rfl:     cephalexin (KEFLEX) 250 MG capsule, Take 1 capsule by mouth Daily., Disp: , Rfl:     cholecalciferol (VITAMIN D3) 25 MCG (1000 UT) tablet, Take 1 tablet by mouth Daily., Disp: , Rfl:     clopidogrel (Plavix) 75 MG tablet, Take 1 tablet by mouth Daily., Disp: 90 tablet, Rfl: 3    ferrous sulfate 325 (65 FE) MG EC tablet, Take 1 tablet by mouth Every Other Day., Disp: , Rfl:     levothyroxine (Synthroid) 75 MCG  "tablet, Take 1 tablet by mouth Daily., Disp: 90 tablet, Rfl: 3    metFORMIN ER (GLUCOPHAGE-XR) 500 MG 24 hr tablet, Take 2 tablets by mouth Daily With Dinner., Disp: 180 tablet, Rfl: 0    Meth-Hyo-M Bl-Na Phos-Ph Sal (Uro-MP) 118 MG capsule, Take  by mouth Daily As Needed., Disp: , Rfl:     metoprolol tartrate (LOPRESSOR) 25 MG tablet, Take 1 tablet by mouth 2 (Two) Times a Day., Disp: , Rfl:     multivitamin with minerals tablet tablet, Take 1 tablet by mouth Daily., Disp: , Rfl:     pantoprazole (PROTONIX) 40 MG EC tablet, Take 1 tablet by mouth Daily., Disp: 90 tablet, Rfl: 3    potassium chloride 10 MEQ CR tablet, Take 1 tablet by mouth Daily., Disp: , Rfl:     rosuvastatin (CRESTOR) 5 MG tablet, Take 1 tablet by mouth Daily., Disp: 90 tablet, Rfl: 3    torsemide (DEMADEX) 20 MG tablet, Take 1 tablet by mouth Daily., Disp: , Rfl:     valsartan (DIOVAN) 80 MG tablet, Take 1 tablet by mouth Daily., Disp: , Rfl:     Vital Signs:   Vitals:    01/03/25 1022 01/03/25 1047   BP: 160/60  Comment: manual 148/60  Comment: manual   BP Location: Left arm Left arm   Patient Position: Sitting Sitting   Pulse: 58 60   Temp: 97.8 °F (36.6 °C)    TempSrc: Oral    SpO2: 98%    Weight: 69.2 kg (152 lb 9.6 oz)    Height: 166.4 cm (65.51\")    Body mass index is 25 kg/m².    Physical Exam:  Physical Exam  Vitals and nursing note reviewed.   Constitutional:       General: She is not in acute distress.     Appearance: She is not ill-appearing.   HENT:      Right Ear: Tympanic membrane and ear canal normal.      Left Ear: Tympanic membrane and ear canal normal.      Mouth/Throat:      Mouth: Mucous membranes are moist.      Comments: Pharynx appears normal  Eyes:      Extraocular Movements: Extraocular movements intact.      Pupils: Pupils are equal, round, and reactive to light.   Neck:      Thyroid: No thyromegaly.   Cardiovascular:      Rate and Rhythm: Normal rate and regular rhythm.      Heart sounds: Murmur heard.   Pulmonary:     "  Effort: Pulmonary effort is normal.      Breath sounds: Normal breath sounds.   Abdominal:      General: There is no distension.      Palpations: Abdomen is soft. There is no mass.      Tenderness: There is no abdominal tenderness.   Musculoskeletal:         General: Tenderness (Marked tenderness of the sacrum) present.      Cervical back: Normal range of motion.   Skin:     Findings: Bruising (Of left forearm) present. No lesion or rash.   Neurological:      General: No focal deficit present.      Mental Status: She is oriented to person, place, and time.      Cranial Nerves: No cranial nerve deficit.   Psychiatric:         Mood and Affect: Mood normal.         Result Review   The following data was reviewed by Jewel Chavez MD on 01/03/2025.  Lab Results   Component Value Date    WBC 4.84 08/05/2024    HGB 10.2 (L) 08/05/2024    HCT 32.3 (L) 08/05/2024    MCV 87.5 08/05/2024     08/05/2024     Lab Results   Component Value Date    GLUCOSE 96 12/06/2024    BUN 19 12/06/2024    CREATININE 0.90 12/06/2024     12/06/2024    K 4.9 12/06/2024     12/06/2024    CALCIUM 9.2 12/06/2024    PROTEINTOT 6.8 12/06/2024    ALBUMIN 4.0 12/06/2024    ALT 14 12/06/2024    AST 20 12/06/2024    ALKPHOS 136 (H) 12/06/2024    BILITOT 0.3 12/06/2024    GLOB 2.8 12/06/2024    AGRATIO 1.4 12/06/2024    BCR 21.1 12/06/2024    ANIONGAP 8.0 12/06/2024    EGFR 62.4 12/06/2024     Lab Results   Component Value Date    CHOL 126 03/26/2024    CHLPL 119 03/10/2021    TRIG 372 (H) 03/26/2024    HDL 29 (L) 03/26/2024    LDL 42 03/26/2024     Lab Results   Component Value Date    TSH 0.593 12/06/2024     Lab Results   Component Value Date    HGBA1C 5.80 (H) 12/06/2024     ECG 12 Lead    Date/Time: 1/3/2025 10:39 AM  Performed by: Jewel Chavez MD    Authorized by: Jewel Chavez MD  Comparison: compared with previous ECG from 1/13/2023  Similar to previous ECG  Rhythm: sinus rhythm  Rate: normal  BPM:  66  Conduction: left bundle branch block    Clinical impression: abnormal EKG  Clinical impression comment: This is an abnormal EKG showing left bundle branch block pattern.  No additional interpretation will be attempted.  No change compared to most recent tracing.           Assessment and Plan:   Today, we have reviewed her care.  Her exam is relatively reassuring.  She seems to be of normal mental status.  There is no worrisome finding as a pertains to the skull or scalp.  She is very tender over the sacrum, and we will x-ray it as a precaution.  She does express concern as to whether there may be some other issue in play.  We will move ahead with EKG, Holter monitor, and labs as a precaution.  We will see what the testing shows and move forward from there.  She has been noted to have a left bundle branch block pattern.  EKG will not be that helpful, but I want to make sure she is in sinus rhythm.    Diagnoses and all orders for this visit:    1. Fall, initial encounter (Primary)    2. Sacral pain  -     XR Sacrum & Coccyx; Future    3. Essential hypertension  -     ECG 12 Lead  -     Holter monitor - 48 hour; Future  -     Comprehensive Metabolic Panel; Future  -     Magnesium; Future    4. Mixed hyperlipidemia  -     Comprehensive Metabolic Panel; Future    5. S/P aortic valve replacement with prosthetic valve  -     CBC Auto Differential; Future    6. Acquired hypothyroidism  -     levothyroxine (Synthroid) 75 MCG tablet; Take 1 tablet by mouth Daily.  Dispense: 90 tablet; Refill: 3  -     TSH; Future    7. Near syncope  -     ECG 12 Lead  -     Holter monitor - 48 hour; Future  -     CBC Auto Differential; Future    8. Other long term (current) drug therapy  -     CBC Auto Differential; Future  -     Magnesium; Future    Follow Up  Return in about 19 weeks (around 5/16/2025) for Recheck, Next scheduled follow up.  Patient was given instructions and counseling regarding her condition or for health maintenance  advice. Please see specific information pulled into the AVS if appropriate.

## 2025-01-04 LAB — GGT SERPL-CCNC: 36 U/L (ref 5–36)

## 2025-01-08 DIAGNOSIS — R55 NEAR SYNCOPE: ICD-10-CM
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
## 2025-01-08 DIAGNOSIS — M53.3 SACRAL PAIN: ICD-10-CM

## 2025-01-09 DIAGNOSIS — R09.82 POST-NASAL DRIP: ICD-10-CM

## 2025-01-09 RX ORDER — AZELASTINE 1 MG/ML
2 SPRAY, METERED NASAL 2 TIMES DAILY
Qty: 30 ML | Refills: 2 | Status: SHIPPED | OUTPATIENT
Start: 2025-01-09

## 2025-01-15 ENCOUNTER — CLINICAL SUPPORT (OUTPATIENT)
Dept: FAMILY MEDICINE CLINIC | Age: 85
End: 2025-01-15
Payer: MEDICARE

## 2025-01-15 VITALS — HEART RATE: 58 BPM | SYSTOLIC BLOOD PRESSURE: 123 MMHG | DIASTOLIC BLOOD PRESSURE: 41 MMHG

## 2025-01-16 NOTE — PROGRESS NOTES
Pt inf, she states she is feeling better, will f/u with cardiology next month as sched.   RT Inhaler-Nebulizer Bronchodilator Protocol Note    There is a bronchodilator order in the chart from a provider indicating to follow the RT Bronchodilator Protocol and there is an Initiate RT Inhaler-Nebulizer Bronchodilator Protocol order as well (see protocol at bottom of note). CXR Findings:  XR CHEST PORTABLE    Result Date: 10/12/2021  Tip of NG tube is at the GE junction. Consider further advancement by approximately 8-9 cm Slight improved aeration of the lungs. Bilateral pleuroparenchymal disease remains. XR CHEST PORTABLE    Result Date: 10/12/2021  1. The endotracheal tube tip is approximately 3 cm above the brayden. 2. Enteric tube remains in the esophagus. The tube should be advanced 10 cm. 3. Right lung pneumonia. 4. Left basilar atelectasis or pneumonia. 5. Suspect pulmonary edema superimposed on chronic interstitial lung disease. The findings from the last RT Protocol Assessment were as follows:   History Pulmonary Disease: Chronic pulmonary disease  Respiratory Pattern: Dyspnea on exertion or RR 21-25 bpm  Breath Sounds: Slightly diminished and/or crackles  Cough: Strong, spontaneous, non-productive  Indication for Bronchodilator Therapy: On home bronchodilators  Bronchodilator Assessment Score: 6    Aerosolized bronchodilator medication orders have been revised according to the RT Inhaler-Nebulizer Bronchodilator Protocol below. Respiratory Therapist to perform RT Therapy Protocol Assessment initially then follow the protocol. Repeat RT Therapy Protocol Assessment PRN for score 0-3 or on second treatment, BID, and PRN for scores above 3. No Indications  adjust the frequency to every 6 hours PRN wheezing or bronchospasm, if no treatments needed after 48 hours then discontinue using Per Protocol order mode. If indication present, adjust the RT bronchodilator orders based on the Bronchodilator Assessment Score as indicated below.   Use Inhaler orders unless patient has one or more of the following: on home nebulizer, not able to hold breath for 10 seconds, is not alert and oriented, cannot activate and use MDI correctly, or respiratory rate 25 breaths per minute or more, then use the equivalent nebulizer order(s) with same Frequency and PRN reasons based on the score. If a patient is on this medication at home then do not decrease Frequency below that used at home. 0-3  enter or revise RT bronchodilator order(s) to equivalent RT Bronchodilator order with Frequency of every 4 hours PRN for wheezing or increased work of breathing using Per Protocol order mode. 4-6  enter or revise RT Bronchodilator order(s) to two equivalent RT bronchodilator orders with one order with BID Frequency and one order with Frequency of every 4 hours PRN wheezing or increased work of breathing using Per Protocol order mode. 7-10  enter or revise RT Bronchodilator order(s) to two equivalent RT bronchodilator orders with one order with TID Frequency and one order with Frequency of every 4 hours PRN wheezing or increased work of breathing using Per Protocol order mode. 11-13  enter or revise RT Bronchodilator order(s) to one equivalent RT bronchodilator order with QID Frequency and an Albuterol order with Frequency of every 4 hours PRN wheezing or increased work of breathing using Per Protocol order mode. Greater than 13  enter or revise RT Bronchodilator order(s) to one equivalent RT bronchodilator order with every 4 hours Frequency and an Albuterol order with Frequency of every 2 hours PRN wheezing or increased work of breathing using Per Protocol order mode.        Electronically signed by Colten Rice RCP on 10/13/2021 at 9:35 PM

## 2025-01-16 NOTE — PROGRESS NOTES
Vitals:    01/15/25 0959   BP: 123/41   BP Location: Left arm   Patient Position: Sitting   Pulse: 58     Please let Zoila Dixon know that her blood pressure and heart rate were reasonable yesterday.  See how she is feeling, and let me know.  Did she stop by for a particular reason or did we ask her to stop in?  Confirm that she has or will be having cardiology follow-up.  Thanks.

## 2025-02-03 ENCOUNTER — TELEPHONE (OUTPATIENT)
Dept: FAMILY MEDICINE CLINIC | Age: 85
End: 2025-02-03
Payer: MEDICARE

## 2025-05-16 ENCOUNTER — LAB (OUTPATIENT)
Dept: LAB | Facility: HOSPITAL | Age: 85
End: 2025-05-16
Payer: MEDICARE

## 2025-05-16 ENCOUNTER — OFFICE VISIT (OUTPATIENT)
Dept: FAMILY MEDICINE CLINIC | Age: 85
End: 2025-05-16
Payer: MEDICARE

## 2025-05-16 VITALS
HEIGHT: 66 IN | DIASTOLIC BLOOD PRESSURE: 64 MMHG | SYSTOLIC BLOOD PRESSURE: 158 MMHG | BODY MASS INDEX: 25.71 KG/M2 | HEART RATE: 62 BPM | OXYGEN SATURATION: 95 % | TEMPERATURE: 98.2 F | WEIGHT: 160 LBS

## 2025-05-16 DIAGNOSIS — I63.81 LACUNAR INFARCTION: ICD-10-CM

## 2025-05-16 DIAGNOSIS — K21.9 GERD WITHOUT ESOPHAGITIS: ICD-10-CM

## 2025-05-16 DIAGNOSIS — E11.9 TYPE 2 DIABETES MELLITUS WITHOUT COMPLICATION, WITHOUT LONG-TERM CURRENT USE OF INSULIN: ICD-10-CM

## 2025-05-16 DIAGNOSIS — I10 ESSENTIAL HYPERTENSION: ICD-10-CM

## 2025-05-16 DIAGNOSIS — E03.9 ACQUIRED HYPOTHYROIDISM: ICD-10-CM

## 2025-05-16 DIAGNOSIS — M1A.0710 CHRONIC IDIOPATHIC GOUT INVOLVING TOE OF RIGHT FOOT WITHOUT TOPHUS: ICD-10-CM

## 2025-05-16 DIAGNOSIS — E78.2 MIXED HYPERLIPIDEMIA: ICD-10-CM

## 2025-05-16 DIAGNOSIS — Z79.899 OTHER LONG TERM (CURRENT) DRUG THERAPY: ICD-10-CM

## 2025-05-16 DIAGNOSIS — E11.9 TYPE 2 DIABETES MELLITUS WITHOUT COMPLICATION, WITHOUT LONG-TERM CURRENT USE OF INSULIN: Primary | ICD-10-CM

## 2025-05-16 LAB
ALBUMIN SERPL-MCNC: 3.9 G/DL (ref 3.5–5.2)
ALBUMIN/GLOB SERPL: 1.5 G/DL
ALP SERPL-CCNC: 130 U/L (ref 39–117)
ALT SERPL W P-5'-P-CCNC: 11 U/L (ref 1–33)
ANION GAP SERPL CALCULATED.3IONS-SCNC: 11 MMOL/L (ref 5–15)
AST SERPL-CCNC: 18 U/L (ref 1–32)
BASOPHILS # BLD AUTO: 0.01 10*3/MM3 (ref 0–0.2)
BASOPHILS NFR BLD AUTO: 0.2 % (ref 0–1.5)
BILIRUB SERPL-MCNC: 0.3 MG/DL (ref 0–1.2)
BUN SERPL-MCNC: 16 MG/DL (ref 8–23)
BUN/CREAT SERPL: 18.2 (ref 7–25)
CALCIUM SPEC-SCNC: 8.8 MG/DL (ref 8.6–10.5)
CHLORIDE SERPL-SCNC: 105 MMOL/L (ref 98–107)
CHOLEST SERPL-MCNC: 101 MG/DL (ref 0–200)
CO2 SERPL-SCNC: 26 MMOL/L (ref 22–29)
CREAT SERPL-MCNC: 0.88 MG/DL (ref 0.57–1)
DEPRECATED RDW RBC AUTO: 48.5 FL (ref 37–54)
EGFRCR SERPLBLD CKD-EPI 2021: 64.1 ML/MIN/1.73
EOSINOPHIL # BLD AUTO: 0.07 10*3/MM3 (ref 0–0.4)
EOSINOPHIL NFR BLD AUTO: 1.5 % (ref 0.3–6.2)
ERYTHROCYTE [DISTWIDTH] IN BLOOD BY AUTOMATED COUNT: 14.4 % (ref 12.3–15.4)
GLOBULIN UR ELPH-MCNC: 2.6 GM/DL
GLUCOSE SERPL-MCNC: 181 MG/DL (ref 65–99)
HBA1C MFR BLD: 5.9 % (ref 4.8–5.6)
HCT VFR BLD AUTO: 33.8 % (ref 34–46.6)
HDLC SERPL-MCNC: 28 MG/DL (ref 40–60)
HGB BLD-MCNC: 10.5 G/DL (ref 12–15.9)
IMM GRANULOCYTES # BLD AUTO: 0.01 10*3/MM3 (ref 0–0.05)
IMM GRANULOCYTES NFR BLD AUTO: 0.2 % (ref 0–0.5)
LDLC SERPL CALC-MCNC: 32 MG/DL (ref 0–100)
LDLC/HDLC SERPL: 0.68 {RATIO}
LYMPHOCYTES # BLD AUTO: 1.26 10*3/MM3 (ref 0.7–3.1)
LYMPHOCYTES NFR BLD AUTO: 26.6 % (ref 19.6–45.3)
MCH RBC QN AUTO: 28.7 PG (ref 26.6–33)
MCHC RBC AUTO-ENTMCNC: 31.1 G/DL (ref 31.5–35.7)
MCV RBC AUTO: 92.3 FL (ref 79–97)
MONOCYTES # BLD AUTO: 0.47 10*3/MM3 (ref 0.1–0.9)
MONOCYTES NFR BLD AUTO: 9.9 % (ref 5–12)
NEUTROPHILS NFR BLD AUTO: 2.92 10*3/MM3 (ref 1.7–7)
NEUTROPHILS NFR BLD AUTO: 61.6 % (ref 42.7–76)
PLATELET # BLD AUTO: 155 10*3/MM3 (ref 140–450)
PMV BLD AUTO: 10.5 FL (ref 6–12)
POTASSIUM SERPL-SCNC: 4.2 MMOL/L (ref 3.5–5.2)
PROT SERPL-MCNC: 6.5 G/DL (ref 6–8.5)
RBC # BLD AUTO: 3.66 10*6/MM3 (ref 3.77–5.28)
SODIUM SERPL-SCNC: 142 MMOL/L (ref 136–145)
TRIGL SERPL-MCNC: 270 MG/DL (ref 0–150)
TSH SERPL DL<=0.05 MIU/L-ACNC: 7.47 UIU/ML (ref 0.27–4.2)
URATE SERPL-MCNC: 5.1 MG/DL (ref 2.4–5.7)
VLDLC SERPL-MCNC: 41 MG/DL (ref 5–40)
WBC NRBC COR # BLD AUTO: 4.74 10*3/MM3 (ref 3.4–10.8)

## 2025-05-16 PROCEDURE — 84550 ASSAY OF BLOOD/URIC ACID: CPT

## 2025-05-16 PROCEDURE — 36415 COLL VENOUS BLD VENIPUNCTURE: CPT

## 2025-05-16 PROCEDURE — 80061 LIPID PANEL: CPT

## 2025-05-16 PROCEDURE — 84443 ASSAY THYROID STIM HORMONE: CPT

## 2025-05-16 PROCEDURE — 85025 COMPLETE CBC W/AUTO DIFF WBC: CPT

## 2025-05-16 PROCEDURE — 83036 HEMOGLOBIN GLYCOSYLATED A1C: CPT

## 2025-05-16 PROCEDURE — 80053 COMPREHEN METABOLIC PANEL: CPT

## 2025-05-16 RX ORDER — ALLOPURINOL 100 MG/1
100 TABLET ORAL DAILY
COMMUNITY
Start: 2025-04-24

## 2025-05-16 RX ORDER — PANTOPRAZOLE SODIUM 40 MG/1
40 TABLET, DELAYED RELEASE ORAL DAILY
Qty: 90 TABLET | Refills: 3 | Status: SHIPPED | OUTPATIENT
Start: 2025-05-16

## 2025-05-16 RX ORDER — CLOPIDOGREL BISULFATE 75 MG/1
75 TABLET ORAL DAILY
Qty: 90 TABLET | Refills: 3 | Status: SHIPPED | OUTPATIENT
Start: 2025-05-16

## 2025-05-16 RX ORDER — ROSUVASTATIN CALCIUM 5 MG/1
5 TABLET, COATED ORAL DAILY
Qty: 90 TABLET | Refills: 3 | Status: SHIPPED | OUTPATIENT
Start: 2025-05-16

## 2025-05-16 RX ORDER — LEVOTHYROXINE SODIUM 75 UG/1
75 TABLET ORAL DAILY
Qty: 90 TABLET | Refills: 3 | Status: SHIPPED | OUTPATIENT
Start: 2025-05-16

## 2025-05-16 RX ORDER — METFORMIN HYDROCHLORIDE 500 MG/1
1000 TABLET, EXTENDED RELEASE ORAL
Qty: 180 TABLET | Refills: 3 | Status: SHIPPED | OUTPATIENT
Start: 2025-05-16

## 2025-05-16 NOTE — PROGRESS NOTES
"Zoila Whipple presents to [facility].    Chief Complaint:  Diabetes, blood pressure, cholesterol, thyroid    Subjective   History of Present Illness:  Zoila is also here for follow-up on her usual care.  She has type 2 diabetes for which she remains on metformin.  Her sugar typically runs in the 100s.  She denies significant hypoglycemia.    She also has hypertension, elevated cholesterol, and hypothyroidism.  She remains on treatment for these.  Her blood pressure is somewhat elevated on initial check.  She says her blood pressure always runs above normal.  She does see cardiology as well who helps manage.  She does have a somewhat complicated cardiac history.    Review of Systems:  Review of Systems   Constitutional:  Negative for chills and fever.   Respiratory:  Negative for cough and shortness of breath.    Cardiovascular:  Negative for chest pain and palpitations.   Gastrointestinal:  Negative for abdominal pain, nausea and vomiting.      Objective   Medical History:  Past Medical History:    Acquired hypothyroidism    Coronary artery disease    Essential hypertension    GERD without esophagitis    H/O aortic valve replacement with porcine valve    Mixed hyperlipidemia    Type 2 diabetes mellitus     Past Surgical History:    AORTIC VALVE REPAIR/REPLACEMENT    APPENDECTOMY    CORONARY STENT PLACEMENT    HYSTERECTOMY    INGUINAL HERNIA REPAIR    LAMINECTOMY      Family History   Problem Relation Age of Onset    No Known Problems Mother         Mother  94 years old \"old age\"    Heart attack Father         Father  49 years old MI     Social History     Tobacco Use    Smoking status: Never    Smokeless tobacco: Never   Substance Use Topics    Alcohol use: Never       Health Maintenance Due   Topic Date Due    TDAP/TD VACCINES (1 - Tdap) Never done    URINE MICROALBUMIN-CREATININE RATIO (uACR)  2021    DIABETIC EYE EXAM  2025    LIPID PANEL  2025    HEMOGLOBIN " A1C  06/06/2025    DXA SCAN  07/24/2025        Immunization History   Administered Date(s) Administered    ABRYSVO (RSV, 60+ or pregnant women 32-36 wks) 11/11/2024    COVID-19 (MODERNA) 1st,2nd,3rd Dose Monovalent 02/12/2021, 03/12/2021    COVID-19 (PFIZER) Purple Cap Monovalent 12/03/2021    Covid-19 (Pfizer) Gray Cap Monovalent 06/21/2022    Fluzone  >6mos 10/08/2013    Fluzone High-Dose 65+YRS 09/21/2017, 11/11/2024    Fluzone High-Dose 65+yrs 09/29/2021, 10/20/2022, 09/29/2023    Pneumococcal Conjugate 13-Valent (PCV13) 09/21/2016    Pneumococcal Polysaccharide (PPSV23) 09/21/2017    Shingrix 06/09/2023, 09/05/2023       Allergies   Allergen Reactions    Medrol [Methylprednisolone] Rash    Niacin Rash    Sulfamethoxazole-Trimethoprim Rash        Medications:    Current Outpatient Medications:     allopurinol (ZYLOPRIM) 100 MG tablet, Take 1 tablet by mouth Daily., Disp: , Rfl:     apraclonidine (IOPIDINE) 0.5 % ophthalmic solution, Administer 1 drop into each eye 2 (Two) Times a Day., Disp: 5 mL, Rfl: 1    aspirin 81 MG chewable tablet, Chew 1 tablet Daily., Disp: , Rfl:     azelastine (ASTELIN) 0.1 % nasal spray, Administer 2 sprays into the nostril(s) as directed by provider 2 (Two) Times a Day., Disp: 30 mL, Rfl: 2    brimonidine (ALPHAGAN) 0.15 % ophthalmic solution, Administer 1 drop to both eyes 2 (Two) Times a Day., Disp: , Rfl:     cephalexin (KEFLEX) 250 MG capsule, Take 1 capsule by mouth Daily., Disp: , Rfl:     cholecalciferol (VITAMIN D3) 25 MCG (1000 UT) tablet, Take 1 tablet by mouth Daily., Disp: , Rfl:     clopidogrel (Plavix) 75 MG tablet, Take 1 tablet by mouth Daily., Disp: 90 tablet, Rfl: 3    Coenzyme Q10 200 MG capsule, Take 200 mg by mouth Daily., Disp: , Rfl:     levothyroxine (Synthroid) 75 MCG tablet, Take 1 tablet by mouth Daily., Disp: 90 tablet, Rfl: 3    metFORMIN ER (GLUCOPHAGE-XR) 500 MG 24 hr tablet, Take 2 tablets by mouth Daily With Dinner., Disp: 180 tablet, Rfl: 3     "Meth-Hyo-M Bl-Na Phos-Ph Sal (Uro-MP) 118 MG capsule, Take  by mouth Daily As Needed., Disp: , Rfl:     metoprolol tartrate (LOPRESSOR) 25 MG tablet, Take 1 tablet by mouth Daily., Disp: , Rfl:     pantoprazole (PROTONIX) 40 MG EC tablet, Take 1 tablet by mouth Daily., Disp: 90 tablet, Rfl: 3    potassium chloride 10 MEQ CR tablet, Take 1 tablet by mouth Daily., Disp: , Rfl:     rosuvastatin (CRESTOR) 5 MG tablet, Take 1 tablet by mouth Daily., Disp: 90 tablet, Rfl: 3    torsemide (DEMADEX) 20 MG tablet, Take 1 tablet by mouth Daily., Disp: , Rfl:     valsartan (DIOVAN) 80 MG tablet, Take 1 tablet by mouth Daily., Disp: , Rfl:     Vital Signs:   Vitals:    05/16/25 0842 05/16/25 0912   BP: 159/66 158/64   Pulse: 55 62   Temp: 98.2 °F (36.8 °C)    TempSrc: Oral    SpO2: 95%    Weight: 72.6 kg (160 lb)    Height: 166.4 cm (65.51\")    Body mass index is 26.21 kg/m².    Physical Exam:  Physical Exam  Vitals reviewed.   Constitutional:       General: She is not in acute distress.     Appearance: She is not ill-appearing.   Eyes:      Pupils: Pupils are equal, round, and reactive to light.   Neck:      Comments: No thyromegaly  Cardiovascular:      Rate and Rhythm: Normal rate and regular rhythm.      Pulses:           Dorsalis pedis pulses are 2+ on the right side and 2+ on the left side.      Heart sounds: Murmur heard.   Pulmonary:      Effort: Pulmonary effort is normal.      Breath sounds: Normal breath sounds.   Abdominal:      General: There is no distension.      Palpations: Abdomen is soft.      Tenderness: There is no abdominal tenderness.   Musculoskeletal:      Cervical back: Neck supple.   Feet:      Right foot:      Protective Sensation: 3 sites tested.  3 sites sensed.      Skin integrity: Skin integrity normal.      Toenail Condition: Right toenails are abnormally thick.      Left foot:      Protective Sensation: 3 sites tested.  3 sites sensed.      Skin integrity: Skin integrity normal.      Toenail " Condition: Left toenails are abnormally thick.   Lymphadenopathy:      Cervical: No cervical adenopathy.   Skin:     Findings: No lesion or rash.   Neurological:      Mental Status: She is alert.     Result Review   The following data was reviewed by Jewel Chavez MD on 05/16/2025.  Lab Results   Component Value Date    WBC 5.51 01/03/2025    HGB 10.4 (L) 01/03/2025    HCT 33.4 (L) 01/03/2025    MCV 87.7 01/03/2025     01/03/2025     Lab Results   Component Value Date    GLUCOSE 130 (H) 01/03/2025    BUN 17 01/03/2025    CREATININE 0.78 01/03/2025     01/03/2025    K 4.8 01/03/2025     01/03/2025    CALCIUM 9.2 01/03/2025    PROTEINTOT 7.1 01/03/2025    ALBUMIN 3.5 01/03/2025    ALT 11 01/03/2025    AST 18 01/03/2025    ALKPHOS 141 (H) 01/03/2025    BILITOT 0.3 01/03/2025    GLOB 3.6 01/03/2025    AGRATIO 1.0 01/03/2025    BCR 21.8 01/03/2025    ANIONGAP 10.2 01/03/2025    EGFR 74.1 01/03/2025     Lab Results   Component Value Date    CHOL 126 03/26/2024    CHLPL 119 03/10/2021    TRIG 372 (H) 03/26/2024    HDL 29 (L) 03/26/2024    LDL 42 03/26/2024     Lab Results   Component Value Date    TSH 1.460 01/03/2025     Lab Results   Component Value Date    HGBA1C 5.80 (H) 12/06/2024          Assessment and Plan:   Today, we have reviewed her care.  Overall, Zoila Dixon is doing well.  Her blood pressure is above goal, and we will recheck it before she leaves.  Otherwise, we will refill her medications and update labs as noted below.  Tentative follow-up with me will be again in about 6 months for wellness exam.  No other short-term change is anticipated.    Diagnoses and all orders for this visit:    1. Type 2 diabetes mellitus without complication, without long-term current use of insulin (Primary)  -     Hemoglobin A1c; Future  -     Comprehensive Metabolic Panel; Future  -     Microalbumin / Creatinine Urine Ratio - Urine, Clean Catch; Future  -     metFORMIN ER (GLUCOPHAGE-XR) 500 MG 24 hr  tablet; Take 2 tablets by mouth Daily With Dinner.  Dispense: 180 tablet; Refill: 3    2. Essential hypertension  -     Comprehensive Metabolic Panel; Future    3. Mixed hyperlipidemia  -     Lipid Panel; Future  -     Comprehensive Metabolic Panel; Future  -     rosuvastatin (CRESTOR) 5 MG tablet; Take 1 tablet by mouth Daily.  Dispense: 90 tablet; Refill: 3    4. Acquired hypothyroidism  -     TSH; Future  -     levothyroxine (Synthroid) 75 MCG tablet; Take 1 tablet by mouth Daily.  Dispense: 90 tablet; Refill: 3    5. Other long term (current) drug therapy  -     CBC Auto Differential; Future  -     Uric Acid; Future    6. Chronic idiopathic gout involving toe of right foot without tophus  -     Uric Acid; Future    7. Lacunar infarction  -     clopidogrel (Plavix) 75 MG tablet; Take 1 tablet by mouth Daily.  Dispense: 90 tablet; Refill: 3    8. GERD without esophagitis  -     pantoprazole (PROTONIX) 40 MG EC tablet; Take 1 tablet by mouth Daily.  Dispense: 90 tablet; Refill: 3    Follow Up  Return in about 6 months (around 11/16/2025) for Recheck, Medicare Wellness.  Patient was given instructions and counseling regarding her condition or for health maintenance advice. Please see specific information pulled into the AVS if appropriate.

## 2025-05-17 ENCOUNTER — RESULTS FOLLOW-UP (OUTPATIENT)
Dept: FAMILY MEDICINE CLINIC | Age: 85
End: 2025-05-17
Payer: MEDICARE

## 2025-06-16 ENCOUNTER — CLINICAL SUPPORT (OUTPATIENT)
Dept: FAMILY MEDICINE CLINIC | Age: 85
End: 2025-06-16
Payer: MEDICARE

## 2025-06-16 ENCOUNTER — TELEPHONE (OUTPATIENT)
Dept: FAMILY MEDICINE CLINIC | Age: 85
End: 2025-06-16
Payer: MEDICARE

## 2025-06-16 VITALS — DIASTOLIC BLOOD PRESSURE: 53 MMHG | HEART RATE: 55 BPM | SYSTOLIC BLOOD PRESSURE: 147 MMHG

## 2025-06-16 NOTE — TELEPHONE ENCOUNTER
----- Message from Whitney PALOMINO sent at 5/19/2025  8:27 AM EDT -----  TICKLE blood pressure check 4 weeks.

## 2025-06-17 NOTE — PROGRESS NOTES
Blood pressure continues to be slightly elevated  Continue to get periodic blood pressure rechecks    [facility],  Jaiden Dacosta MD

## 2025-06-24 ENCOUNTER — LAB (OUTPATIENT)
Dept: LAB | Facility: HOSPITAL | Age: 85
End: 2025-06-24
Payer: MEDICARE

## 2025-06-24 DIAGNOSIS — E11.9 TYPE 2 DIABETES MELLITUS WITHOUT COMPLICATION, WITHOUT LONG-TERM CURRENT USE OF INSULIN: ICD-10-CM

## 2025-06-24 LAB
ALBUMIN UR-MCNC: 2 MG/DL
CREAT UR-MCNC: 18.3 MG/DL
MICROALBUMIN/CREAT UR: 109.3 MG/G (ref 0–29)

## 2025-06-24 PROCEDURE — 82043 UR ALBUMIN QUANTITATIVE: CPT

## 2025-06-24 PROCEDURE — 82570 ASSAY OF URINE CREATININE: CPT

## 2025-07-06 ENCOUNTER — APPOINTMENT (OUTPATIENT)
Dept: CT IMAGING | Facility: HOSPITAL | Age: 85
End: 2025-07-06
Payer: MEDICARE

## 2025-07-06 ENCOUNTER — HOSPITAL ENCOUNTER (INPATIENT)
Facility: HOSPITAL | Age: 85
LOS: 1 days | Discharge: HOME | End: 2025-07-07
Attending: EMERGENCY MEDICINE | Admitting: STUDENT IN AN ORGANIZED HEALTH CARE EDUCATION/TRAINING PROGRAM
Payer: MEDICARE

## 2025-07-06 DIAGNOSIS — Z74.09 IMPAIRED MOBILITY AND ADLS: ICD-10-CM

## 2025-07-06 DIAGNOSIS — R13.12 DYSPHAGIA, OROPHARYNGEAL: ICD-10-CM

## 2025-07-06 DIAGNOSIS — E78.2 MIXED HYPERLIPIDEMIA: ICD-10-CM

## 2025-07-06 DIAGNOSIS — Z78.9 IMPAIRED MOBILITY AND ADLS: ICD-10-CM

## 2025-07-06 DIAGNOSIS — R26.2 DIFFICULTY IN WALKING: ICD-10-CM

## 2025-07-06 DIAGNOSIS — I63.9 CEREBROVASCULAR ACCIDENT (CVA), UNSPECIFIED MECHANISM: Primary | ICD-10-CM

## 2025-07-06 LAB
ABO GROUP BLD: NORMAL
ALBUMIN SERPL-MCNC: 4.2 G/DL (ref 3.5–5.2)
ALBUMIN/GLOB SERPL: 1.5 G/DL
ALP SERPL-CCNC: 148 U/L (ref 39–117)
ALT SERPL W P-5'-P-CCNC: 10 U/L (ref 1–33)
ANION GAP SERPL CALCULATED.3IONS-SCNC: 11.3 MMOL/L (ref 5–15)
APTT PPP: 30.3 SECONDS (ref 24.2–34.2)
AST SERPL-CCNC: 16 U/L (ref 1–32)
BASOPHILS # BLD AUTO: 0.03 10*3/MM3 (ref 0–0.2)
BASOPHILS NFR BLD AUTO: 0.6 % (ref 0–1.5)
BILIRUB SERPL-MCNC: 0.2 MG/DL (ref 0–1.2)
BLD GP AB SCN SERPL QL: NEGATIVE
BUN SERPL-MCNC: 16.2 MG/DL (ref 8–23)
BUN/CREAT SERPL: 18 (ref 7–25)
CALCIUM SPEC-SCNC: 8.9 MG/DL (ref 8.6–10.5)
CHLORIDE SERPL-SCNC: 100 MMOL/L (ref 98–107)
CO2 SERPL-SCNC: 24.7 MMOL/L (ref 22–29)
CREAT SERPL-MCNC: 0.9 MG/DL (ref 0.57–1)
DEPRECATED RDW RBC AUTO: 46.2 FL (ref 37–54)
EGFRCR SERPLBLD CKD-EPI 2021: 62.4 ML/MIN/1.73
EOSINOPHIL # BLD AUTO: 0.08 10*3/MM3 (ref 0–0.4)
EOSINOPHIL NFR BLD AUTO: 1.6 % (ref 0.3–6.2)
ERYTHROCYTE [DISTWIDTH] IN BLOOD BY AUTOMATED COUNT: 14.3 % (ref 12.3–15.4)
GLOBULIN UR ELPH-MCNC: 2.8 GM/DL
GLUCOSE BLDC GLUCOMTR-MCNC: 135 MG/DL (ref 70–99)
GLUCOSE SERPL-MCNC: 134 MG/DL (ref 65–99)
HCT VFR BLD AUTO: 33.7 % (ref 34–46.6)
HGB BLD-MCNC: 10.6 G/DL (ref 12–15.9)
HOLD SPECIMEN: NORMAL
HOLD SPECIMEN: NORMAL
IMM GRANULOCYTES # BLD AUTO: 0.01 10*3/MM3 (ref 0–0.05)
IMM GRANULOCYTES NFR BLD AUTO: 0.2 % (ref 0–0.5)
INR PPP: 1.07 (ref 0.86–1.15)
LYMPHOCYTES # BLD AUTO: 1.68 10*3/MM3 (ref 0.7–3.1)
LYMPHOCYTES NFR BLD AUTO: 33 % (ref 19.6–45.3)
MCH RBC QN AUTO: 28.1 PG (ref 26.6–33)
MCHC RBC AUTO-ENTMCNC: 31.5 G/DL (ref 31.5–35.7)
MCV RBC AUTO: 89.4 FL (ref 79–97)
MONOCYTES # BLD AUTO: 0.54 10*3/MM3 (ref 0.1–0.9)
MONOCYTES NFR BLD AUTO: 10.6 % (ref 5–12)
NEUTROPHILS NFR BLD AUTO: 2.75 10*3/MM3 (ref 1.7–7)
NEUTROPHILS NFR BLD AUTO: 54 % (ref 42.7–76)
NRBC BLD AUTO-RTO: 0 /100 WBC (ref 0–0.2)
PLATELET # BLD AUTO: 157 10*3/MM3 (ref 140–450)
PMV BLD AUTO: 11.3 FL (ref 6–12)
POTASSIUM SERPL-SCNC: 4.2 MMOL/L (ref 3.5–5.2)
PROT SERPL-MCNC: 7 G/DL (ref 6–8.5)
PROTHROMBIN TIME: 14.3 SECONDS (ref 11.8–14.9)
RBC # BLD AUTO: 3.77 10*6/MM3 (ref 3.77–5.28)
RH BLD: POSITIVE
SODIUM SERPL-SCNC: 136 MMOL/L (ref 136–145)
T&S EXPIRATION DATE: NORMAL
WBC NRBC COR # BLD AUTO: 5.09 10*3/MM3 (ref 3.4–10.8)
WHOLE BLOOD HOLD COAG: NORMAL
WHOLE BLOOD HOLD SPECIMEN: NORMAL

## 2025-07-06 PROCEDURE — 85610 PROTHROMBIN TIME: CPT | Performed by: EMERGENCY MEDICINE

## 2025-07-06 PROCEDURE — 82948 REAGENT STRIP/BLOOD GLUCOSE: CPT

## 2025-07-06 PROCEDURE — 86900 BLOOD TYPING SEROLOGIC ABO: CPT | Performed by: EMERGENCY MEDICINE

## 2025-07-06 PROCEDURE — 85025 COMPLETE CBC W/AUTO DIFF WBC: CPT | Performed by: EMERGENCY MEDICINE

## 2025-07-06 PROCEDURE — 85730 THROMBOPLASTIN TIME PARTIAL: CPT | Performed by: EMERGENCY MEDICINE

## 2025-07-06 PROCEDURE — 86850 RBC ANTIBODY SCREEN: CPT | Performed by: EMERGENCY MEDICINE

## 2025-07-06 PROCEDURE — 86901 BLOOD TYPING SEROLOGIC RH(D): CPT | Performed by: EMERGENCY MEDICINE

## 2025-07-06 PROCEDURE — 80053 COMPREHEN METABOLIC PANEL: CPT | Performed by: EMERGENCY MEDICINE

## 2025-07-06 PROCEDURE — 70450 CT HEAD/BRAIN W/O DYE: CPT

## 2025-07-06 PROCEDURE — 93005 ELECTROCARDIOGRAM TRACING: CPT | Performed by: EMERGENCY MEDICINE

## 2025-07-06 PROCEDURE — 99285 EMERGENCY DEPT VISIT HI MDM: CPT

## 2025-07-06 RX ORDER — SODIUM CHLORIDE 0.9 % (FLUSH) 0.9 %
10 SYRINGE (ML) INJECTION AS NEEDED
Status: DISCONTINUED | OUTPATIENT
Start: 2025-07-06 | End: 2025-07-07 | Stop reason: HOSPADM

## 2025-07-06 NOTE — Clinical Note
Level of Care: Progressive Care [20]   Diagnosis: Stroke [551995]   Certification: I Certify That Inpatient Hospital Services Are Medically Necessary For Greater Than 2 Midnights

## 2025-07-07 ENCOUNTER — APPOINTMENT (OUTPATIENT)
Dept: CT IMAGING | Facility: HOSPITAL | Age: 85
End: 2025-07-07
Payer: MEDICARE

## 2025-07-07 ENCOUNTER — APPOINTMENT (OUTPATIENT)
Dept: CARDIOLOGY | Facility: HOSPITAL | Age: 85
End: 2025-07-07
Payer: MEDICARE

## 2025-07-07 ENCOUNTER — APPOINTMENT (OUTPATIENT)
Dept: GENERAL RADIOLOGY | Facility: HOSPITAL | Age: 85
End: 2025-07-07
Payer: MEDICARE

## 2025-07-07 ENCOUNTER — APPOINTMENT (OUTPATIENT)
Dept: MRI IMAGING | Facility: HOSPITAL | Age: 85
End: 2025-07-07
Payer: MEDICARE

## 2025-07-07 ENCOUNTER — READMISSION MANAGEMENT (OUTPATIENT)
Dept: CALL CENTER | Facility: HOSPITAL | Age: 85
End: 2025-07-07
Payer: MEDICARE

## 2025-07-07 ENCOUNTER — TELEPHONE (OUTPATIENT)
Dept: FAMILY MEDICINE CLINIC | Age: 85
End: 2025-07-07
Payer: MEDICARE

## 2025-07-07 VITALS
DIASTOLIC BLOOD PRESSURE: 50 MMHG | HEIGHT: 66 IN | TEMPERATURE: 97.6 F | RESPIRATION RATE: 18 BRPM | BODY MASS INDEX: 26.15 KG/M2 | WEIGHT: 162.7 LBS | HEART RATE: 96 BPM | OXYGEN SATURATION: 95 % | SYSTOLIC BLOOD PRESSURE: 156 MMHG

## 2025-07-07 PROBLEM — I63.9 STROKE: Status: ACTIVE | Noted: 2025-07-07

## 2025-07-07 LAB
ABO GROUP BLD: NORMAL
BACTERIA UR QL AUTO: ABNORMAL /HPF
BILIRUB UR QL STRIP: NEGATIVE
CHOLEST SERPL-MCNC: 117 MG/DL (ref 0–200)
CLARITY UR: ABNORMAL
COLOR UR: ABNORMAL
GLUCOSE BLDC GLUCOMTR-MCNC: 95 MG/DL (ref 70–99)
GLUCOSE UR STRIP-MCNC: NEGATIVE MG/DL
HBA1C MFR BLD: 6.8 % (ref 4.8–5.6)
HDLC SERPL-MCNC: 24 MG/DL (ref 40–60)
HGB UR QL STRIP.AUTO: ABNORMAL
HYALINE CASTS UR QL AUTO: ABNORMAL /LPF
KETONES UR QL STRIP: NEGATIVE
LDL/HDL RATIO NULL: ABNORMAL
LDLC SERPL CALC-MCNC: 26 MG/DL (ref 0–100)
LEUKOCYTE ESTERASE UR QL STRIP.AUTO: ABNORMAL
NITRITE UR QL STRIP: POSITIVE
PH UR STRIP.AUTO: 6 [PH] (ref 5–8)
PROT UR QL STRIP: NEGATIVE
QT INTERVAL: 472 MS
QTC INTERVAL: 509 MS
RBC # UR STRIP: ABNORMAL /HPF
REF LAB TEST METHOD: ABNORMAL
RH BLD: POSITIVE
SP GR UR STRIP: <=1.005 (ref 1–1.03)
SQUAMOUS #/AREA URNS HPF: ABNORMAL /HPF
TRIGL SERPL-MCNC: 485 MG/DL (ref 0–150)
UROBILINOGEN UR QL STRIP: ABNORMAL
VLDLC SERPL-MCNC: 67 MG/DL (ref 5–40)
WBC # UR STRIP: ABNORMAL /HPF
WHOLE BLOOD HOLD SPECIMEN: NORMAL

## 2025-07-07 PROCEDURE — 86901 BLOOD TYPING SEROLOGIC RH(D): CPT

## 2025-07-07 PROCEDURE — 99223 1ST HOSP IP/OBS HIGH 75: CPT | Performed by: PSYCHIATRY & NEUROLOGY

## 2025-07-07 PROCEDURE — 86900 BLOOD TYPING SEROLOGIC ABO: CPT

## 2025-07-07 PROCEDURE — 80061 LIPID PANEL: CPT | Performed by: STUDENT IN AN ORGANIZED HEALTH CARE EDUCATION/TRAINING PROGRAM

## 2025-07-07 PROCEDURE — 25010000002 HEPARIN (PORCINE) PER 1000 UNITS: Performed by: STUDENT IN AN ORGANIZED HEALTH CARE EDUCATION/TRAINING PROGRAM

## 2025-07-07 PROCEDURE — 97165 OT EVAL LOW COMPLEX 30 MIN: CPT

## 2025-07-07 PROCEDURE — 99236 HOSP IP/OBS SAME DATE HI 85: CPT | Performed by: INTERNAL MEDICINE

## 2025-07-07 PROCEDURE — 97161 PT EVAL LOW COMPLEX 20 MIN: CPT

## 2025-07-07 PROCEDURE — 81001 URINALYSIS AUTO W/SCOPE: CPT | Performed by: EMERGENCY MEDICINE

## 2025-07-07 PROCEDURE — 70496 CT ANGIOGRAPHY HEAD: CPT

## 2025-07-07 PROCEDURE — 25010000002 ONDANSETRON PER 1 MG: Performed by: STUDENT IN AN ORGANIZED HEALTH CARE EDUCATION/TRAINING PROGRAM

## 2025-07-07 PROCEDURE — 83036 HEMOGLOBIN GLYCOSYLATED A1C: CPT | Performed by: STUDENT IN AN ORGANIZED HEALTH CARE EDUCATION/TRAINING PROGRAM

## 2025-07-07 PROCEDURE — 99233 SBSQ HOSP IP/OBS HIGH 50: CPT | Performed by: PSYCHIATRY & NEUROLOGY

## 2025-07-07 PROCEDURE — 71045 X-RAY EXAM CHEST 1 VIEW: CPT

## 2025-07-07 PROCEDURE — 70498 CT ANGIOGRAPHY NECK: CPT

## 2025-07-07 PROCEDURE — 25510000001 IOPAMIDOL PER 1 ML: Performed by: EMERGENCY MEDICINE

## 2025-07-07 PROCEDURE — 25810000003 SODIUM CHLORIDE 0.9 % SOLUTION: Performed by: STUDENT IN AN ORGANIZED HEALTH CARE EDUCATION/TRAINING PROGRAM

## 2025-07-07 PROCEDURE — 70551 MRI BRAIN STEM W/O DYE: CPT

## 2025-07-07 PROCEDURE — 82948 REAGENT STRIP/BLOOD GLUCOSE: CPT

## 2025-07-07 PROCEDURE — 92610 EVALUATE SWALLOWING FUNCTION: CPT

## 2025-07-07 PROCEDURE — 36415 COLL VENOUS BLD VENIPUNCTURE: CPT | Performed by: STUDENT IN AN ORGANIZED HEALTH CARE EDUCATION/TRAINING PROGRAM

## 2025-07-07 PROCEDURE — 0042T HC CT CEREBRAL PERFUSION W/WO CONTRAST: CPT

## 2025-07-07 RX ORDER — ACETAMINOPHEN 160 MG/5ML
650 SOLUTION ORAL EVERY 4 HOURS PRN
Status: DISCONTINUED | OUTPATIENT
Start: 2025-07-07 | End: 2025-07-07 | Stop reason: HOSPADM

## 2025-07-07 RX ORDER — TICAGRELOR 90 MG/1
180 TABLET, FILM COATED ORAL ONCE
Status: COMPLETED | OUTPATIENT
Start: 2025-07-07 | End: 2025-07-07

## 2025-07-07 RX ORDER — ONDANSETRON 2 MG/ML
4 INJECTION INTRAMUSCULAR; INTRAVENOUS EVERY 6 HOURS PRN
Status: DISCONTINUED | OUTPATIENT
Start: 2025-07-07 | End: 2025-07-07 | Stop reason: HOSPADM

## 2025-07-07 RX ORDER — SODIUM CHLORIDE 0.9 % (FLUSH) 0.9 %
10 SYRINGE (ML) INJECTION AS NEEDED
Status: DISCONTINUED | OUTPATIENT
Start: 2025-07-07 | End: 2025-07-07 | Stop reason: HOSPADM

## 2025-07-07 RX ORDER — ATORVASTATIN CALCIUM 40 MG/1
80 TABLET, FILM COATED ORAL NIGHTLY
Status: DISCONTINUED | OUTPATIENT
Start: 2025-07-07 | End: 2025-07-07 | Stop reason: HOSPADM

## 2025-07-07 RX ORDER — BISACODYL 5 MG/1
5 TABLET, DELAYED RELEASE ORAL DAILY PRN
Status: DISCONTINUED | OUTPATIENT
Start: 2025-07-07 | End: 2025-07-07 | Stop reason: HOSPADM

## 2025-07-07 RX ORDER — AMOXICILLIN 250 MG
2 CAPSULE ORAL 2 TIMES DAILY PRN
Status: DISCONTINUED | OUTPATIENT
Start: 2025-07-07 | End: 2025-07-07 | Stop reason: HOSPADM

## 2025-07-07 RX ORDER — ASPIRIN AND DIPYRIDAMOLE 25; 200 MG/1; MG/1
1 CAPSULE, EXTENDED RELEASE ORAL 2 TIMES DAILY
Status: DISCONTINUED | OUTPATIENT
Start: 2025-07-07 | End: 2025-07-07

## 2025-07-07 RX ORDER — IOPAMIDOL 755 MG/ML
150 INJECTION, SOLUTION INTRAVASCULAR
Status: COMPLETED | OUTPATIENT
Start: 2025-07-07 | End: 2025-07-07

## 2025-07-07 RX ORDER — BISACODYL 10 MG
10 SUPPOSITORY, RECTAL RECTAL DAILY PRN
Status: DISCONTINUED | OUTPATIENT
Start: 2025-07-07 | End: 2025-07-07 | Stop reason: HOSPADM

## 2025-07-07 RX ORDER — ONDANSETRON 4 MG/1
4 TABLET, ORALLY DISINTEGRATING ORAL EVERY 6 HOURS PRN
Status: DISCONTINUED | OUTPATIENT
Start: 2025-07-07 | End: 2025-07-07 | Stop reason: HOSPADM

## 2025-07-07 RX ORDER — NITROGLYCERIN 0.4 MG/1
0.4 TABLET SUBLINGUAL
Status: DISCONTINUED | OUTPATIENT
Start: 2025-07-07 | End: 2025-07-07

## 2025-07-07 RX ORDER — SODIUM CHLORIDE 0.9 % (FLUSH) 0.9 %
10 SYRINGE (ML) INJECTION EVERY 12 HOURS SCHEDULED
Status: DISCONTINUED | OUTPATIENT
Start: 2025-07-07 | End: 2025-07-07 | Stop reason: HOSPADM

## 2025-07-07 RX ORDER — POLYETHYLENE GLYCOL 3350 17 G/17G
17 POWDER, FOR SOLUTION ORAL DAILY PRN
Status: DISCONTINUED | OUTPATIENT
Start: 2025-07-07 | End: 2025-07-07 | Stop reason: HOSPADM

## 2025-07-07 RX ORDER — ACETAMINOPHEN 325 MG/1
650 TABLET ORAL EVERY 4 HOURS PRN
Status: DISCONTINUED | OUTPATIENT
Start: 2025-07-07 | End: 2025-07-07 | Stop reason: HOSPADM

## 2025-07-07 RX ORDER — SODIUM CHLORIDE 9 MG/ML
40 INJECTION, SOLUTION INTRAVENOUS AS NEEDED
Status: DISCONTINUED | OUTPATIENT
Start: 2025-07-07 | End: 2025-07-07 | Stop reason: HOSPADM

## 2025-07-07 RX ORDER — ROSUVASTATIN CALCIUM 5 MG/1
10 TABLET, COATED ORAL DAILY
Start: 2025-07-07 | End: 2025-07-15

## 2025-07-07 RX ORDER — TICAGRELOR 90 MG/1
90 TABLET, FILM COATED ORAL 2 TIMES DAILY
Qty: 60 TABLET | Refills: 0 | Status: SHIPPED | OUTPATIENT
Start: 2025-07-07 | End: 2025-07-15

## 2025-07-07 RX ORDER — SODIUM CHLORIDE 9 MG/ML
75 INJECTION, SOLUTION INTRAVENOUS CONTINUOUS
Status: DISCONTINUED | OUTPATIENT
Start: 2025-07-07 | End: 2025-07-07

## 2025-07-07 RX ORDER — ACETAMINOPHEN 650 MG/1
650 SUPPOSITORY RECTAL EVERY 4 HOURS PRN
Status: DISCONTINUED | OUTPATIENT
Start: 2025-07-07 | End: 2025-07-07 | Stop reason: HOSPADM

## 2025-07-07 RX ORDER — ASPIRIN 81 MG/1
81 TABLET ORAL DAILY
Status: DISCONTINUED | OUTPATIENT
Start: 2025-07-08 | End: 2025-07-07 | Stop reason: HOSPADM

## 2025-07-07 RX ORDER — HEPARIN SODIUM 5000 [USP'U]/ML
5000 INJECTION, SOLUTION INTRAVENOUS; SUBCUTANEOUS EVERY 12 HOURS SCHEDULED
Status: DISCONTINUED | OUTPATIENT
Start: 2025-07-07 | End: 2025-07-07 | Stop reason: HOSPADM

## 2025-07-07 RX ADMIN — Medication 10 ML: at 03:11

## 2025-07-07 RX ADMIN — ASPIRIN AND EXTENDED-RELEASE DIPYRIDAMOLE 1 CAPSULE: 25; 200 CAPSULE ORAL at 05:25

## 2025-07-07 RX ADMIN — Medication 10 ML: at 03:10

## 2025-07-07 RX ADMIN — ASPIRIN AND EXTENDED-RELEASE DIPYRIDAMOLE 1 CAPSULE: 25; 200 CAPSULE ORAL at 09:32

## 2025-07-07 RX ADMIN — HEPARIN SODIUM 5000 UNITS: 5000 INJECTION INTRAVENOUS; SUBCUTANEOUS at 09:32

## 2025-07-07 RX ADMIN — IOPAMIDOL 150 ML: 755 INJECTION, SOLUTION INTRAVENOUS at 00:18

## 2025-07-07 RX ADMIN — ONDANSETRON 4 MG: 2 INJECTION INTRAMUSCULAR; INTRAVENOUS at 08:34

## 2025-07-07 RX ADMIN — ACETAMINOPHEN 650 MG: 325 TABLET ORAL at 11:51

## 2025-07-07 RX ADMIN — SODIUM CHLORIDE 75 ML/HR: 9 INJECTION, SOLUTION INTRAVENOUS at 03:10

## 2025-07-07 RX ADMIN — TICAGRELOR 180 MG: 90 TABLET ORAL at 15:08

## 2025-07-07 RX ADMIN — ATORVASTATIN CALCIUM 80 MG: 40 TABLET, FILM COATED ORAL at 03:10

## 2025-07-07 RX ADMIN — HEPARIN SODIUM 5000 UNITS: 5000 INJECTION INTRAVENOUS; SUBCUTANEOUS at 03:10

## 2025-07-07 NOTE — THERAPY EVALUATION
Acute Care - Physical Therapy Initial Evaluation   [facility]     Patient Name: Zoila Whipple  : 1940  MRN: [id number]  Today's Date: 2025      Visit Dx:     ICD-10-CM ICD-9-CM   1. Cerebrovascular accident (CVA), unspecified mechanism  I63.9 434.91   2. Impaired mobility and ADLs  Z74.09 V49.89    Z78.9    3. Difficulty in walking  R26.2 719.7     Patient Active Problem List   Diagnosis    Type 2 diabetes mellitus    S/P aortic valve replacement with prosthetic valve    Mixed hyperlipidemia    GERD without esophagitis    Acquired hypothyroidism    Essential hypertension    H/O aortic valve replacement with porcine valve    Lacunar infarction    Chronic idiopathic gout involving toe of right foot without tophus    Stroke     Past Medical History:   Diagnosis Date    Acquired hypothyroidism     Coronary artery disease     Essential hypertension     GERD without esophagitis     H/O aortic valve replacement with porcine valve     Mixed hyperlipidemia     Type 2 diabetes mellitus      Past Surgical History:   Procedure Laterality Date    AORTIC VALVE REPAIR/REPLACEMENT      APPENDECTOMY      CORONARY STENT PLACEMENT      HYSTERECTOMY      INGUINAL HERNIA REPAIR Right     LAMINECTOMY       PT Assessment (Last 12 Hours)       PT Evaluation and Treatment       Row Name 25 1100          Physical Therapy Time and Intention    Subjective Information no complaints (P)   -EB     Document Type evaluation (P)   -EB     Mode of Treatment individual therapy;physical therapy (P)   -EB     Patient Effort good (P)   -EB     Symptoms Noted During/After Treatment none (P)   -EB       Row Name 25 1100          General Information    Patient Profile Reviewed yes (P)   -EB     Patient Observations alert;agree to therapy;cooperative (P)   -EB     Prior Level of Function independent:;all household mobility;community mobility (P)   -EB     Equipment Currently Used at Home none (P)   -EB     Existing  Precautions/Restrictions fall (P)   -EB     Barriers to Rehab none identified (P)   -EB       Row Name 07/07/25 1100          Living Environment    Current Living Arrangements home (P)   -EB     Home Accessibility stairs to enter home (P)   -EB     People in Home alone (P)   -[facility] Name 07/07/25 1100          Home Main Entrance    Number of Stairs, Main Entrance twelve (P)   -EB       Row Name 07/07/25 1100          Home Use of Assistive/Adaptive Equipment    Equipment Currently Used at Home none (P)   -EB       Row Name 07/07/25 1100          Range of Motion (ROM)    Range of Motion ROM is WFL (P)   -EB       Row Name 07/07/25 1100          Strength (Manual Muscle Testing)    Strength (Manual Muscle Testing) strength is WFL (P)   except L knee flexion 4-/5  -EB       Row Name 07/07/25 1100          Bed Mobility    Bed Mobility supine-sit;sit-supine (P)   -EB     All Activities, [place] (Bed Mobility) contact guard (P)   -EB     Supine-Sit [place] (Bed Mobility) contact guard (P)   -EB     Sit-Supine [place] (Bed Mobility) contact guard (P)   -EB     Assistive Device (Bed Mobility) bed rails;head of bed elevated (P)   -EB       Row Name 07/07/25 1100          Transfers    Transfers stand-sit transfer;sit-stand transfer (P)   -EB     Maintains Weight-bearing Status (Transfers) able to maintain (P)   -EB       Row Name 07/07/25 1100          Sit-Stand Transfer    Sit-Stand [place] (Transfers) contact guard (P)   -EB     Assistive Device (Sit-Stand Transfers) walker, front-wheeled (P)   -EB       Row Name 07/07/25 1100          Stand-Sit Transfer    Stand-Sit [place] (Transfers) contact guard (P)   -EB     Assistive Device (Stand-Sit Transfers) walker, front-wheeled (P)   -EB       Row Name 07/07/25 1100          Gait/Stairs (Locomotion)    Gait/Stairs Locomotion gait/ambulation assistive device (P)   -EB     [place] Level (Gait) contact guard (P)   -EB     Assistive Device (Gait)  walker, front-wheeled (P)   -EB     Patient was able to Ambulate yes (P)   -EB     Distance in Feet (Gait) 40 (P)   -EB     Pattern (Gait) 3-point;step-through (P)   -EB       Row Name 07/07/25 1100          Safety Issues/Impairments Affecting Functional Mobility    Safety Issues Affecting Function (Mobility) ability to follow commands (P)   -EB     Impairments Affecting Function (Mobility) balance;endurance/activity tolerance;strength (P)   -EB       Row Name 07/07/25 1100          Balance    Balance Assessment standing dynamic balance (P)   -EB     Dynamic Standing Balance contact guard (P)   -EB     Position/Device Used, Standing Balance walker, front-wheeled (P)   -EB       Row Name 07/07/25 1100          Plan of Care Review    Plan of Care Reviewed With patient (P)   -EB     Outcome Evaluation Patient presents with minimal deficits in balance and endurance limiting safe ambulation. Skilled rehab services are required to address functional deficits. (P)   -EB       Row Name 07/07/25 1100          Positioning and Restraints    Pre-Treatment Position in bed (P)   -EB     Post Treatment Position bed (P)   -EB       Row Name 07/07/25 1100          Therapy Assessment/Plan (PT)    Rehab Potential (PT) good (P)   -EB     Criteria for Skilled Interventions Met (PT) yes (P)   -EB     Therapy Frequency (PT) daily (P)   -EB     Predicted Duration of Therapy Intervention (PT) 10 (P)   -EB     Problem List (PT) problems related to;balance;mobility;strength (P)   -EB     Activity Limitations Related to Problem List (PT) unable to ambulate safely (P)   -EB       Row Name 07/07/25 1100          PT Evaluation Complexity    History, PT Evaluation Complexity no personal factors and/or comorbidities (P)   -EB     Examination of Body Systems (PT Eval Complexity) total of 3 or more elements (P)   -EB     Clinical Presentation (PT Evaluation Complexity) stable (P)   -EB     Clinical Decision Making (PT Evaluation Complexity) low  complexity (P)   -EB     Overall Complexity (PT Evaluation Complexity) low complexity (P)   -EB       Row Name 07/07/25 1100          Therapy Plan Review/Discharge Plan (PT)    Therapy Plan Review (PT) patient (P)   -EB       Row Name 07/07/25 1100          Physical Therapy Goals    Gait Training Goal Selection (PT) gait training, PT goal 1 (P)   -EB       Row Name 07/07/25 1100          Gait Training Goal 1 (PT)    Activity/Assistive Device (Gait Training Goal 1, PT) gait (walking locomotion);assistive device use (P)   -EB     [place] Level (Gait Training Goal 1, PT) independent (P)   -EB     Distance (Gait Training Goal 1, PT) 300 (P)   -EB     Time Frame (Gait Training Goal 1, PT) long term goal (LTG);10 days (P)   -EB               User Key  (r) = Recorded By, (t) = Taken By, (c) = Cosigned By      Initials Name Provider Type    Melanie Jeffries, PT Student PT Student                    Physical Therapy Education       Title: PT OT SLP Therapies (Done)       Topic: Physical Therapy (Done)       Point: Mobility training (Done)       Learning Progress Summary            Patient Acceptance, E,TB, VU by  at 7/7/2025 1154                                      User Key       Initials Effective Dates Name Provider Type Discipline     05/27/25 -  Melanie Tran, PT Student PT Student PT                  PT Recommendation and Plan  Anticipated Discharge Disposition (PT): (P) inpatient rehabilitation facility  Planned Therapy Interventions (PT): (P) gait training, balance training, bed mobility training, home exercise program, ROM (range of motion), strengthening, transfer training  Therapy Frequency (PT): (P) daily  Plan of Care Reviewed With: (P) patient  Outcome Evaluation: (P) Patient presents with minimal deficits in balance and endurance limiting safe ambulation. Skilled rehab services are required to address functional deficits.   Outcome Measures       Row Name 07/07/25 1100             How much help from  another person do you currently need...    Turning from your back to your side while in flat bed without using bedrails? 4 (P)   -EB      Moving from lying on back to sitting on the side of a flat bed without bedrails? 4 (P)   -EB      Moving to and from a bed to a chair (including a wheelchair)? 4 (P)   -EB      Standing up from a chair using your arms (e.g., wheelchair, bedside chair)? 4 (P)   -EB      Climbing 3-5 steps with a railing? 4 (P)   -EB      To walk in hospital room? 4 (P)   -EB      AM-PAC 6 Clicks Score (PT) 24 (P)   -EB         Functional Assessment    Outcome Measure Options AM-PAC 6 Clicks Basic Mobility (PT) (P)   -EB                User Key  (r) = Recorded By, (t) = Taken By, (c) = Cosigned By      Initials Name Provider Type    Melanie Jeffries, PT Student PT Student                     Time Calculation:    PT Charges       Row Name 07/07/25 1145 07/07/25 1144          Time Calculation    PT Received On -- 07/07/25 (P)   -EB     PT Goal Re-Cert Due Date 07/16/25 (P)   -EB --        Untimed Charges    PT Eval/Re-eval Minutes -- 15 (P)   -EB        Total Minutes    Untimed Charges Total Minutes -- 15 (P)   -EB      Total Minutes -- 15 (P)   -EB               User Key  (r) = Recorded By, (t) = Taken By, (c) = Cosigned By      Initials Name Provider Type    EB Melanie Tran, PT Student PT Student                  Therapy Charges for Today       Code Description Service Date Service Provider Modifiers Qty    06219246455 HC PT EVAL LOW COMPLEXITY 2 7/7/2025 Melanie Tran, PT Student GP 1            PT G-Codes  Outcome Measure Options: (P) AM-PAC 6 Clicks Basic Mobility (PT)  AM-PAC 6 Clicks Score (PT): (P) 24  AM-PAC 6 Clicks Score (OT): 21    Melanie Tran PT Student  7/7/2025

## 2025-07-07 NOTE — OUTREACH NOTE
Prep Survey      Flowsheet Row Responses   [facility] facility patient discharged from? [facility]   Is LACE score < 7 ? Yes   Eligibility [facility]   Date of Admission 07/06/25   Date of Discharge 07/07/25   Discharge Disposition Home or Self Care   Discharge diagnosis Stroke   Does the patient have one of the following disease processes/diagnoses(primary or secondary)? Stroke   Does the patient have Home health ordered? No   Is there a DME ordered? No   Prep survey completed? Yes            JONATHAN ALEMAN - Registered Nurse

## 2025-07-07 NOTE — CONSULTS
Patient discharged prior to A1c results. Patient is a known diabetic and takes Metformin ER 1000 mg daily with dinner. Blood sugars WNL during admission.

## 2025-07-07 NOTE — CONSULTS
[facility]  [facility]      Patient Name:   Zoila Whipple  YOB: 1940  Identification Number:   MRN - [id number]  Date of Service:   07/06/2025 22:50:47    Diagnosis:        I63.00 - Cerebrovascular accident (CVA) due to thrombosis of precerebral artery ([facility]CCC)    Impression:       Zoila Whipple is an 84-year-old female with a past medical history of type 2 diabetes, dyslipidemia, hypertension, hypothyroidism, coronary artery disease, and bladder problems. She also has a history of aortic valve replacement most recently a (TAVR in July 2024) presenting with left-sided hemiparesis and coordination issues. 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    Our recommendations are outlined below.    Recommendations:          Stroke/Telemetry Floor        Neuro Checks (Q2)        Bedside Swallow Eval        DVT Prophylaxis        IV Fluids, Normal Saline        Head of Bed 30 Degrees        Euglycemia and Avoid Hyperthermia (PRN Acetaminophen)        Initiate Aspirin/dipyridamole 25mg IR/200 mg ER twice daily        Antihypertensives PRN if Blood pressure is greater than 220/120 or there is a concern for End organ damage/contraindications for permissive HTN. If blood pressure is greater than 220/120 give labetalol PO or IV or Vasotec IV with a goal of 15% reduction in BP during the first 24 hours.        - CT angiogram (CTA) of head and neck        - MRI of brain wo contrast        - Transthoracic Echocardiogram        - Vascular risk factor evaluations (cholesterol panel, Hgb A1c, and TSH)        - avoid hypotension        - avoid hyperglycemia        - avoid hyperthermia        - Physical therapy, occupational therapy, speech therapy evals.    Sign Out:        Discussed with Emergency Department Provider        ------------------------------------------------------------------------------    Advanced Imaging:  Advanced Imaging Deferred  "because:    Non-disabling symptoms as verified by the patient; no cortical signs so not consistent with LVO      Metrics:  Last Known Well: 07/06/2025 11:00:00  Dispatch Time: 07/06/2025 22:49:37  Arrival Time: 07/06/2025 23:53:44  Initial Response Time: 07/06/2025 23:02:27  Symptoms: left sided difficulty .  Initial patient interaction: 07/06/2025 23:51:25  NIHSS Assessment Completed: 07/07/2025 00:02:19  Patient is not a candidate for Thrombolytic.  Thrombolytic Medical Decision: 07/06/2025 23:51:25  Patient was not deemed candidate for Thrombolytic because of following reasons:  LKW outside 4.5 hr window. .    CT Head:  CT head unremarkable for acute infarction or hemorrhage per Radiology: No acute changes    Primary Provider Notified of Diagnostic Impression and Management Plan on: 07/07/2025 00:05:18        ------------------------------------------------------------------------------    History of Present Illness:  Patient is a 84 year old Female.    Patient was brought by private transportation with symptoms of left sided difficulty .  Zoila Whipple, age 84, reported experiencing a sudden onset of symptoms at approximately 11:00 AM On July 6, 2025,while ironing clothes. She described the event as a shock, with symptoms coming on all of a sudden. She noted left-sided weakness and numbness, including involvement of her face and leg. She stated that her face felt different and that she was unable to walk as steadily as her baseline, requiring the use of a walker at home, which she does not normally use. She reported that her symptoms have improved since onset but continued to feel very weak and had a asscoicated headache.    She recalled having a CT scan four years ago that showed \"a couple black spots,\" but stated that this is the first time she has ever had symptoms of a stroke. No other prior neurological symptoms were reported.       Past Medical History:       Hypertension       Diabetes Mellitus       " Hyperlipidemia       Coronary Artery Disease  Other PMH:  AVR   GERD   Hypothyroid    Medications:    No Anticoagulant use   Antiplatelet use: Yes ASA/ plavix  Reviewed EMR for current medications    Allergies:   Reviewed    Social History:  Smoking: No  Alcohol Use: No  Drug Use: No    Family History:    There is no family history of premature cerebrovascular disease pertinent to this consultation    ROS :  14 Points Review of Systems was performed and was negative except mentioned in HPI.    Past Surgical History:  There Is No Surgical History Contributory To Today’s Visit         Examination:  BP(209/60), Pulse(76),  1A: Level of Consciousness - Alert; keenly responsive + 0  1B: Ask Month and Age - Both Questions Right + 0  1C: Blink Eyes & Squeeze Hands - Performs Both Tasks + 0  2: Test Horizontal Extraocular Movements - Normal + 0  3: Test Visual Fields - No Visual Loss + 0  4: Test Facial Palsy (Use Grimace if Obtunded) - Minor paralysis (flat nasolabial fold, smile asymmetry) + 1  5A: Test Left Arm Motor Drift - Drift, but doesn't hit bed + 1  5B: Test Right Arm Motor Drift - No Drift for 10 Seconds + 0  6A: Test Left Leg Motor Drift - Drift, but doesn't hit bed + 1  6B: Test Right Leg Motor Drift - No Drift for 5 Seconds + 0  7: Test Limb Ataxia (FNF/Heel-Shin) - Ataxia in 1 Limb + 1  8: Test Sensation - Mild-Moderate Loss: Less Sharp/More Dull + 1  9: Test Language/Aphasia - Normal; No aphasia + 0  10: Test Dysarthria - Normal + 0  11: Test Extinction/Inattention - No abnormality + 0    NIHSS Score: 5    NIHSS Free Text : she appears more ataxic in the UE out of proportion to level of weakness    Pre-Morbid Modified Chepe Scale:  0 Points = No symptoms at all    Spoke with : ER doctor  I reviewed the available imaging via Rapid and initiated discussion with the primary provider    This consult was conducted in real time using interactive audio and video technology. Patient was informed of the technology  being used for this visit and agreed to proceed. Patient located in hospital and provider located at home/office setting.      Patient is being evaluated for possible acute neurologic impairment and high probability of imminent or life-threatening deterioration. I spent total of 30 minutes providing care to this patient, including time for face to face visit via telemedicine, review of medical records, imaging studies and discussion of findings with providers, the patient and/or family.      Dr Eliel Rossi      [facility]  For Inpatient follow-up with [facility] physician please call [facility] at [phone]. As we are not an outpatient service for any post hospital discharge needs please contact the hospital for assistance.  If you have any questions for the [facility] physicians or need to reconsult for clinical or diagnostic changes please contact us via [facility] at [phone].

## 2025-07-07 NOTE — H&P
"    Patient Care Team:  Jewel Chavez MD as PCP - General (Family Medicine)  Sky Bowman APRN (Family Medicine)  Eliel Pavon MD as Consulting Physician (Urology)    Chief complaint left-sided hemiparesis and coordination issues    Subjective     Patient is a 84 y.o. female presents with left hemiparesis coordination issues.  She has a history of type 2 diabetes, dyslipidemia, hypertension, hypothyroidism, CAD, aortic valve replacement in 2024.  Patient was brought in by private transportation with left-sided deficits.  Symptoms started around 11 AM on  while ironing close.  Symptoms occurred all of a sudden and included a left-sided weakness and numbness involving her face and leg.  Patient was unable to walk steadily and required the use of a walker at home which she usually does not use.  She also does have associated headache.    Review of Systems   Pertinent items are noted in HPI    History  Past Medical History:   Diagnosis Date    Acquired hypothyroidism     Coronary artery disease     Essential hypertension     GERD without esophagitis     H/O aortic valve replacement with porcine valve     Mixed hyperlipidemia     Type 2 diabetes mellitus      Past Surgical History:   Procedure Laterality Date    AORTIC VALVE REPAIR/REPLACEMENT      APPENDECTOMY      CORONARY STENT PLACEMENT      HYSTERECTOMY      INGUINAL HERNIA REPAIR Right     LAMINECTOMY       Family History   Problem Relation Age of Onset    No Known Problems Mother         Mother  94 years old \"old age\"    Heart attack Father         Father  49 years old MI     Social History     Tobacco Use    Smoking status: Never    Smokeless tobacco: Never   Vaping Use    Vaping status: Never Used   Substance Use Topics    Alcohol use: Never    Drug use: Never     Medications Prior to Admission   Medication Sig Dispense Refill Last Dose/Taking    allopurinol (ZYLOPRIM) 100 MG tablet Take 1 tablet by mouth Daily.  "      apraclonidine (IOPIDINE) 0.5 % ophthalmic solution Administer 1 drop into each eye 2 (Two) Times a Day. 5 mL 1     aspirin 81 MG chewable tablet Chew 1 tablet Daily.       azelastine (ASTELIN) 0.1 % nasal spray Administer 2 sprays into the nostril(s) as directed by provider 2 (Two) Times a Day. 30 mL 2     brimonidine (ALPHAGAN) 0.15 % ophthalmic solution Administer 1 drop to both eyes 2 (Two) Times a Day.       cephalexin (KEFLEX) 250 MG capsule Take 1 capsule by mouth Daily.       cholecalciferol (VITAMIN D3) 25 MCG (1000 UT) tablet Take 1 tablet by mouth Daily.       clopidogrel (Plavix) 75 MG tablet Take 1 tablet by mouth Daily. 90 tablet 3     Coenzyme Q10 200 MG capsule Take 200 mg by mouth Daily.       levothyroxine (Synthroid) 75 MCG tablet Take 1 tablet by mouth Daily. 90 tablet 3     metFORMIN ER (GLUCOPHAGE-XR) 500 MG 24 hr tablet Take 2 tablets by mouth Daily With Dinner. 180 tablet 3     Meth-Hyo-M Bl-Na Phos-Ph Sal (Uro-MP) 118 MG capsule Take  by mouth Daily As Needed.       metoprolol tartrate (LOPRESSOR) 25 MG tablet Take 1 tablet by mouth Daily.       pantoprazole (PROTONIX) 40 MG EC tablet Take 1 tablet by mouth Daily. 90 tablet 3     potassium chloride 10 MEQ CR tablet Take 1 tablet by mouth Daily.       rosuvastatin (CRESTOR) 5 MG tablet Take 1 tablet by mouth Daily. 90 tablet 3     torsemide (DEMADEX) 20 MG tablet Take 1 tablet by mouth Daily.       valsartan (DIOVAN) 80 MG tablet Take 1 tablet by mouth Daily.        Allergies:  Medrol [methylprednisolone], Niacin, and Sulfamethoxazole-trimethoprim    Objective     Vital Signs  Temp:  [97.5 °F (36.4 °C)-97.7 °F (36.5 °C)] 97.5 °F (36.4 °C)  Heart Rate:  [62-84] 70  Resp:  [14-20] 14  BP: (152-209)/(47-79) 152/79    Physical Exam:      General Appearance:  Alert, cooperative, in no acute distress   Head:  Normocephalic, without obvious abnormality, atraumatic   Eyes:  Lids and lashes normal, conjunctivae and sclerae normal, no icterus, no  pallor, corneas clear, PERRLA   Ears:  Ears appear intact with no abnormalities noted   Throat:  No oral lesions, no thrush, oral mucosa moist   Neck:  No adenopathy, supple, trachea midline, no thyromegaly, no carotid bruit, no JVD   Back:  No kyphosis present, no scoliosis present, no skin lesions, erythema or scars, no tenderness to percussion, or palpation, range of motion normal   Lungs:  Clear to auscultation,respirations regular, even and unlabored    Heart:  Regular rhythm and normal rate, normal S1 and S2, no murmur, no gallop, no rub, no click   Breast Exam:  Deferred   Abdomen:  Normal bowel sounds, no masses, no organomegaly, soft non-tender, non-distended, no guarding, no rebound tenderness   Genitalia:  Deferred   Extremities:  Moves all extremities well, no edema, no cyanosis, no redness   Pulses:  Pulses palpable and equal bilaterally   Skin:  No bleeding, bruising or rash   Lymph nodes:  No palpable adenopathy   Neurologic:  Cranial nerves 2 - 12 grossly intact, sensation intact, DTR present and equal bilaterally       Results Review:    I reviewed the patient's new clinical results.  I reviewed the patient's new imaging results and agree with the interpretation.  I reviewed the patient's other test results and agree with the interpretation  I personally viewed and interpreted the patient's EKG/Telemetry data    Assessment & Plan       Stroke      Admit to PCU under hospitalist service  Neurochecks  Dipyridamole, statin  MRI head without contrast  Echo  PT OT speech  SCDs  Maintain euglycemia and avoid hyperthermia  Supportive care  IV fluids  Patient passed swallow study therefore placed on diet  Full code        Boni Hayes MD  07/07/25  05:23 EDT

## 2025-07-07 NOTE — PLAN OF CARE
Goal Outcome Evaluation:            FUNCTIONAL ASSESSMENT INSTRUMENT: Patient currently scored a level 7 of 7 on Functional Communication Measures for swallowing indicating a 0% limitation in function.    ASSESSMENT/ PLAN OF CARE:  Pt presents with functional oropharyngeal swallow.  No clinical signs or symptoms of aspiration noted.      REHAB POTENTIAL:  Pt has good rehab potential.  The following limitations may influence improvement/ length of tx medical status.    RECOMMENDATIONS:   1.   DIET: Regular diet-thin liquids    2.  POSITION: 90 degrees upper    3.  COMPENSATORY STRATEGIES: General Swallow precautions    No further dysphagia therapy is indicated at this time    Pt/responsible party agrees with plan of care and has been informed of all alternatives, risks and benefits.  EDUCATION  The patient has been educated in the following areas:   Dysphagia (Swallowing Impairment).

## 2025-07-07 NOTE — PLAN OF CARE
Goal Outcome Evaluation:              Outcome Evaluation: Pt. to discharge home and follow up as directed.

## 2025-07-07 NOTE — PROGRESS NOTES
LOS: 0 days     History taken from: patient chart family RN brief discussion with Dr. Haley  Chief complaint: Left-sided weakness and coordination problem     SUBJECTIVE:    Interval History: Zoila Whipple was admitted on 7/6/2025   She was actually advised to come to the hospital after she talked to her granddaughter who happens to be an RN    Her symptoms started probably between 10 and 11 in the morning and she came in the early evening she was outside the window for tenecteplase    She did MRI done which does show right basal ganglia infarct    She is on DAPT  She has some valvular issues but these are not embolic type strokes    She has clinically improved so the plan is to switch to Brilinta and later on if she needs anticoagulation then aspirin plus what ever anticoagulation is used would be recommended because this is small vessel disease    She was obviously not tenecteplase or intervention candidate    Her triglycerides were very elevated so I have talked to admitting and we may consider changing her medication    Very minimal and subtle left-sided weakness and decreased fine motor movement activity but I did not see much otherwise        Vital signs reviewed, blood pressure significant elevated when she presented, systolic documented as high as 209  Afebrile and no rhythm issues reported  Labs reviewed, LDL 26  Urinalysis showed moderate leukocyte esterase, 2+, positive nitrites and too many to count WBCs with 4+ bacteria so likely she has UTI  Random glucose 134  CBC demonstrated H&H 10.6 and 33.7      Home medication DAPT and rosuvastatin 5 mg          MRI brain positive for small infarct, acute, small vessel type, nonhemorrhagic and it explains her symptoms of left-sided coordination problems  Impression:   1.Small areas of diffusion restriction abnormality within the left basal ganglia and midbrain compatible with late acute to subacute infarcts.   2.No acute intracranial hemorrhage.   3.White  matter changes compatible small vessel ischemic disease in this age group are similar to the comparison.     Findings were called to the patient's nurse in the PCU at the time of interpretation     Electronically Signed: Harsh Beaver MD    7/7/2025 7:22 AM EDT    Workstation ID: [id number]             CTA unremarkable      [facility]        Patient Name:   Zoila Whipple  YOB: 1940  Identification Number:   MRN - [id number]  Date of Service:   07/06/2025 22:50:47     Diagnosis:        I63.00 - Cerebrovascular accident (CVA) due to thrombosis of precerebral artery (HCCC)     Impression:       Zoila Whipple is an 84-year-old female with a past medical history of type 2 diabetes, dyslipidemia, hypertension, hypothyroidism, coronary artery disease, and bladder problems. She also has a history of aortic valve replacement most recently a (TAVR in July 2024) presenting with left-sided hemiparesis and coordination issues. 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     Our recommendations are outlined below.     Recommendations:           Stroke/Telemetry Floor        Neuro Checks (Q2)        Bedside Swallow Eval        DVT Prophylaxis        IV Fluids, Normal Saline        Head of Bed 30 Degrees        Euglycemia and Avoid Hyperthermia (PRN Acetaminophen)        Initiate Aspirin/dipyridamole 25mg IR/200 mg ER twice daily        Antihypertensives PRN if Blood pressure is greater than 220/120 or there is a concern for End organ damage/contraindications for permissive HTN. If blood pressure is greater than 220/120 give labetalol PO or IV or Vasotec IV with a goal of 15% reduction in BP during the first 24 hours.        - CT angiogram (CTA) of head and neck        - MRI of brain wo contrast        - Transthoracic Echocardiogram        - Vascular risk factor evaluations (cholesterol panel, Hgb A1c, and TSH)        - avoid hypotension         - avoid hyperglycemia        - avoid hyperthermia        - Physical therapy, occupational therapy, speech therapy evals.     Sign Out:        Discussed with Emergency Department Provider           ------------------------------------------------------------------------------     Advanced Imaging:  Advanced Imaging Deferred because:     Non-disabling symptoms as verified by the patient; no cortical signs so not consistent with LVO        Metrics:  Last Known Well: 07/06/2025 11:00:00  Dispatch Time: 07/06/2025 22:49:37  Arrival Time: 07/06/2025 23:53:44  Initial Response Time: 07/06/2025 23:02:27  Symptoms: left sided difficulty .  Initial patient interaction: 07/06/2025 23:51:25  NIHSS Assessment Completed: 07/07/2025 00:02:19  Patient is not a candidate for Thrombolytic.  Thrombolytic Medical Decision: 07/06/2025 23:51:25  Patient was not deemed candidate for Thrombolytic because of following reasons:  LKW outside 4.5 hr window. .     CT Head:  CT head unremarkable for acute infarction or hemorrhage per Radiology: No acute changes     Primary Provider Notified of Diagnostic Impression and Management Plan on: 07/07/2025 00:05:18           ------------------------------------------------------------------------------     History of Present Illness:  Patient is a 84 year old Female.     Patient was brought by private transportation with symptoms of left sided difficulty .  Zoila Whipple, age 84, reported experiencing a sudden onset of symptoms at approximately 11:00 AM On July 6, 2025,while ironing clothes. She described the event as a shock, with symptoms coming on all of a sudden. She noted left-sided weakness and numbness, including involvement of her face and leg. She stated that her face felt different and that she was unable to walk as steadily as her baseline, requiring the use of a walker at home, which she does not normally use. She reported that her symptoms have improved since onset but continued to feel very  "weak and had a asscoicated headache.     She recalled having a CT scan four years ago that showed \"a couple black spots,\" but stated that this is the first time she has ever had symptoms of a stroke. No other prior neurological symptoms were reported.        Past Medical History:       Hypertension       Diabetes Mellitus       Hyperlipidemia       Coronary Artery Disease  Other PMH:  AVR   GERD   Hypothyroid     Medications:     No Anticoagulant use   Antiplatelet use: Yes ASA/ plavix  Reviewed EMR for current medications     Allergies:   Reviewed     Social History:  Smoking: No  Alcohol Use: No  Drug Use: No     Family History:     There is no family history of premature cerebrovascular disease pertinent to this consultation     ROS :  14 Points Review of Systems was performed and was negative except mentioned in HPI.     Past Surgical History:  There Is No Surgical History Contributory To Today’s Visit           Examination:  BP(209/60), Pulse(76),  1A: Level of Consciousness - Alert; keenly responsive + 0  1B: Ask Month and Age - Both Questions Right + 0  1C: Blink Eyes & Squeeze Hands - Performs Both Tasks + 0  2: Test Horizontal Extraocular Movements - Normal + 0  3: Test Visual Fields - No Visual Loss + 0  4: Test Facial Palsy (Use Grimace if Obtunded) - Minor paralysis (flat nasolabial fold, smile asymmetry) + 1  5A: Test Left Arm Motor Drift - Drift, but doesn't hit bed + 1  5B: Test Right Arm Motor Drift - No Drift for 10 Seconds + 0  6A: Test Left Leg Motor Drift - Drift, but doesn't hit bed + 1  6B: Test Right Leg Motor Drift - No Drift for 5 Seconds + 0  7: Test Limb Ataxia (FNF/Heel-Shin) - Ataxia in 1 Limb + 1  8: Test Sensation - Mild-Moderate Loss: Less Sharp/More Dull + 1  9: Test Language/Aphasia - Normal; No aphasia + 0  10: Test Dysarthria - Normal + 0  11: Test Extinction/Inattention - No abnormality + 0     NIHSS Score: 5     NIHSS Free Text : she appears more ataxic in the UE out of " proportion to level of weakness     Pre-Morbid Modified [place] Scale:  0 Points = No symptoms at all     Spoke with : ER doctor  I reviewed the available imaging via Rapid and initiated discussion with the primary provider     This consult was conducted in real time using interactive audio and video technology. Patient was informed of the technology being used for this visit and agreed to proceed. Patient located in hospital and provider located at home/office setting.        Patient is being evaluated for possible acute neurologic impairment and high probability of imminent or life-threatening deterioration. I spent total of 30 minutes providing care to this patient, including time for face to face visit via telemedicine, review of medical records, imaging studies and discussion of findings with providers, the patient and/or family.        Dr Eliel Rossi                 - Portions of the above HPI were copied from previous encounters and edited as appropriate.    Patient Complaints: Essentially none and she wants to go home      Review of Systems   No fever chills rigors or sweats  No weight issues  No sleep problems  HEENT:  No speech problem, vision changes, facial asymmetry or pain, or neck problem  Chest: No chest pain, clubbing, cyanosis, orthopnea palpitations, coronary artery disease, hypertension, hypertriglyceridemia, aortic valve replacement  Pulmonary:  No shortness of air, cough or expectoration  Abdomen:  No swelling/tension, constipation,diarrhea or pain, GERD  No genitourinary symptoms  Extremity problems as discussed  No back problem  No psychotic issues  Neurologic issues as discussed  No hematologic, dermatologic or endocrine problems, type 2 diabetes mellitus        Pertinent PMH:  has a past medical history of Acquired hypothyroidism, Coronary artery disease, Essential hypertension, GERD without esophagitis, H/O aortic valve replacement with porcine valve, Mixed hyperlipidemia, and Type 2  diabetes mellitus.     ________________________________________________     OBJECTIVE:  Patient is resting comfortably in bed in no acute distress  Normal speech    The patient is awake and alert and oriented x3     Cranial nerve examination demonstrate:  Full fields of vision to confrontation  Pupils are round, reactive to light and accommodation and size of about 3 mm  No ptosis or nystagmus  Funduscopic examination was not successful  Eye movements are conjugate     Sensation on the face and scalp are normal  Muscles of mastication are normal and symmetric  Muscles of  facial expression are normal and symmetric  Hearing is intact bilaterally  Head turning and shoulder shrugs were unremarkable  Tongue was midline  I could not visualize  oropharynx or uvula     Motor examination:  Normal bulk, tone and strength was 5/5 with questionable minimal decreased fine movement movement abnormality on the left, upper extremity  No fasciculations     Sensory examination:  Intact for soft touch, pain   Romberg was not evaluated     Reflexes:  0/4     Coordination:  Normal finger-to-nose to finger, rapid alternating movements and toe to finger     Gait:  Deferred     Toe signs:  Mute      NIH Stroke Scale  Interval: 2 hrs posttreatment  1a. Level of Consciousness: 0-->Alert, keenly responsive  1b. LOC Questions: 0-->Answers both questions correctly  1c. LOC Commands: 0-->Performs both tasks correctly  2. Best Gaze: 0-->Normal  3. Visual: 0-->No visual loss  4. Facial Palsy: 0-->Normal symmetrical movements  5a. Motor Arm, Left: 0-->No drift, limb holds 90 (or 45) degrees for full 10 secs  5b. Motor Arm, Right: 0-->No drift, limb holds 90 (or 45) degrees for full 10 secs  6a. Motor Leg, Left: 0-->No drift, leg holds 30 degree position for full 5 secs  6b. Motor Leg, Right: 0-->No drift, leg holds 30 degree position for full 5 secs  7. Limb Ataxia: 0-->Absent  8. Sensory: 1-->Mild-to-moderate sensory loss, patient feels pinprick  is less sharp or is dull on the affected side, or there is a loss of superficial pain with pinprick, but patient is aware of being touched  9. Best Language: 0-->No aphasia, normal  10. Dysarthria: 0-->Normal  11. Extinction and Inattention (formerly Neglect): 0-->No abnormality  Total (NIH Stroke Scale): 1         ________________________________________________   RESULTS REVIEW    VITAL SIGNS:  Temp:  [97.5 °F (36.4 °C)-97.7 °F (36.5 °C)] 97.5 °F (36.4 °C)  Heart Rate:  [62-84] 75  Resp:  [14-20] 18  BP: (142-209)/(47-96) 142/96    LABS:       Lab 07/06/25 2259   WBC 5.09   HEMOGLOBIN 10.6*   HEMATOCRIT 33.7*   PLATELETS 157   NEUTROS ABS 2.75   IMMATURE GRANS (ABS) 0.01   LYMPHS ABS 1.68   MONOS ABS 0.54   EOS ABS 0.08   MCV 89.4   PROTIME 14.3   APTT 30.3         Lab 07/06/25 2259   SODIUM 136   POTASSIUM 4.2   CHLORIDE 100   CO2 24.7   ANION GAP 11.3   BUN 16.2   CREATININE 0.90   EGFR 62.4   GLUCOSE 134*   CALCIUM 8.9         Lab 07/06/25 2259   TOTAL PROTEIN 7.0   ALBUMIN 4.2   GLOBULIN 2.8   ALT (SGPT) 10   AST (SGOT) 16   BILIRUBIN 0.2   ALK PHOS 148*         Lab 07/06/25 2259   PROTIME 14.3   INR 1.07         Lab 07/07/25  0807   CHOLESTEROL 117   LDL CHOL 26   HDL CHOL 24*   TRIGLYCERIDES 485*         Lab 07/07/25  0807 07/06/25 2259   ABO TYPING AB AB   RH TYPING Positive Positive   ANTIBODY SCREEN  --  Negative         UA          7/25/2024    14:35 7/7/2025    01:39   Urinalysis   Squamous Epithelial Cells, UA 0-2  0-2    Specific Gravity, UA 1.015  <=1.005    Ketones, UA Negative  Negative    Blood, UA Trace  Large (3+)    Leukocytes, UA Moderate (2+)  Moderate (2+)    Nitrite, UA Negative  Positive    RBC, UA 0-2  11-20    WBC, UA 21-50  Too Numerous to Count    Bacteria, UA 2+  4+        Lab Results   Component Value Date    TSH 7.470 (H) 05/16/2025    LDL 26 07/07/2025    HGBA1C 5.90 (H) 05/16/2025    [id number] 313 07/31/2024         IMAGING STUDIES:  MRI Brain Without Contrast  Result Date:  7/7/2025  Impression: 1.Small areas of diffusion restriction abnormality within the left basal ganglia and midbrain compatible with late acute to subacute infarcts. 2.No acute intracranial hemorrhage. 3.White matter changes compatible small vessel ischemic disease in this age group are similar to the comparison. Findings were called to the patient's nurse in the PCU at the time of interpretation Electronically Signed: Harsh Beaver MD  7/7/2025 7:22 AM EDT  Workstation ID: [id number]    CT Angiogram Head w AI Analysis of LVO  Result Date: 7/7/2025  1.There is some plaque at both carotid bifurcations, left greater than right. However, no measurable ICA stenosis is seen on either side. 2.Widely patent vertebral arteries. 3.Smooth mild stenosis in the distal right M1. Mild multifocal atherosclerotic changes in the left posterior cerebral artery. No flow-limiting stenosis or large vessel occlusion is identified. Electronically Signed: Waylon Morfin MD  7/7/2025 1:16 AM EDT  Workstation ID: [id number]    CT Angiogram Neck  Result Date: 7/7/2025  1.There is some plaque at both carotid bifurcations, left greater than right. However, no measurable ICA stenosis is seen on either side. 2.Widely patent vertebral arteries. 3.Smooth mild stenosis in the distal right M1. Mild multifocal atherosclerotic changes in the left posterior cerebral artery. No flow-limiting stenosis or large vessel occlusion is identified. Electronically Signed: Waylon Morfin MD  7/7/2025 1:16 AM EDT  Workstation ID: [id number]    XR Chest 1 View  Result Date: 7/7/2025  No active disease. Electronically Signed: Waylon Morfin MD  7/7/2025 12:29 AM EDT  Workstation ID: [id number]    CT CEREBRAL PERFUSION WITH & WITHOUT CONTRAST  Result Date: 7/7/2025  Normal perfusion scan with no evidence of ischemic tissue or infarct core. Electronically Signed: Waylon Morfin MD  7/7/2025 12:28 AM EDT  Workstation ID: [id number]    CT Head Without Contrast Stroke  Protocol  Result Date: 7/6/2025  Senescent changes without acute abnormality. Electronically Signed: Waylon Morfin MD  7/6/2025 11:09 PM EDT  Workstation ID: [id number]      I reviewed the patient's new clinical results.    ________________________________________________      PROBLEM LIST:    Stroke        ASSESSMENT/PLAN:  Acute nonhemorrhagic, small vessel type, right basal ganglia infarct causing some initial left-sided ataxia and weakness which have essentially resolved    Plan is to continue aspirin 81 mg    Treatment for mixed triglyceridemia    Probably switch to Brilinta and stop Plavix.  We do not do P2 Y12 testing in this facility or send out    So we can go Brilinta 90 mg twice daily with 180 mg one-time load    I talked to her about stroke education and in very detail and advised that she come to the ER immediately    Her granddaughter was present    Modification of stroke risk factors:   - Blood pressure should be less than 130/80 outpatient, HbA1c less than 6.5, LDL less than 70; b12>500 and smoking cessation if applicable. We would be grateful if the primary team / primary care physician keep a close watch on this above targets.  - Stroke education  - Follow up with neurologist of choice      Call if needed    **Please refer to previous notes for further details and recommendations.     I discussed the patient's findings and my recommendations with patient, family, nursing staff, and primary care team    Patricio Rincon MD  07/07/25  09:59 EDT

## 2025-07-07 NOTE — THERAPY EVALUATION
Acute Care - Speech Language Pathology   Swallow Initial Evaluation [facility]     Patient Name: Zoila Whipple  : 1940  MRN: [id number]  Today's Date: 2025               Admit Date: 2025    Visit Dx:     ICD-10-CM ICD-9-CM   1. Cerebrovascular accident (CVA), unspecified mechanism  I63.9 434.91   2. Impaired mobility and ADLs  Z74.09 V49.89    Z78.9    3. Difficulty in walking  R26.2 719.7   4. Dysphagia, oropharyngeal  R13.12 787.22     Patient Active Problem List   Diagnosis    Type 2 diabetes mellitus    S/P aortic valve replacement with prosthetic valve    Mixed hyperlipidemia    GERD without esophagitis    Acquired hypothyroidism    Essential hypertension    H/O aortic valve replacement with porcine valve    Lacunar infarction    Chronic idiopathic gout involving toe of right foot without tophus    Stroke     Past Medical History:   Diagnosis Date    Acquired hypothyroidism     Coronary artery disease     Essential hypertension     GERD without esophagitis     H/O aortic valve replacement with porcine valve     Mixed hyperlipidemia     Type 2 diabetes mellitus      Past Surgical History:   Procedure Laterality Date    AORTIC VALVE REPAIR/REPLACEMENT      APPENDECTOMY      CORONARY STENT PLACEMENT      HYSTERECTOMY      INGUINAL HERNIA REPAIR Right     LAMINECTOMY           Inpatient Speech Pathology Dysphagia Evaluation        PAIN SCALE: None noted    PRECAUTIONS/CONTRAINDICATIONS: N/A    SUSPECTED ABUSE/NEGLECT/EXPLOITATION: None noted    SOCIAL/PSYCHOLOGICAL NEEDS/BARRIERS: None noted    PAST SOCIAL HISTORY: Lives at home    PRIOR FUNCTION: Independent    PATIENT GOALS/EXPECTATIONS: Did not report    HISTORY: This patient is an 84-year-old female admitted with the above diagnosis.  Patient denies difficulty swallowing.    CURRENT DIET LEVEL: Regular diet thin liquid    OBJECTIVE:    TEST ADMINISTERED: Clinical dysphagia evaluation    COGNITION/SAFETY AWARENESS: Alert and  oriented    BEHAVIORAL OBSERVATIONS: Pleasant cooperative    ORAL MOTOR EXAM: Lingual and labial strength and range of motion within functional limits    VOICE QUALITY: Within normal limit    REFLEX EXAM: Deferred    POSTURE: 90 degrees upright    FEEDING/SWALLOWING FUNCTION: Assessed with full range of consistencies with thin liquids from cup and straw, purée consistencies and regular solids    CLINICAL OBSERVATIONS: Oral stage is characterized by good bolus preparation and control.  Timely oral transit.  Pharyngeal phase appears timely with good laryngeal elevation per palpation.  No clinical signs or symptoms of aspiration noted.  Vocal quality remained clear.  Denies globus sensation after completion of swallow    DYSPHAGIA CRITERIA: N/a    FUNCTIONAL ASSESSMENT INSTRUMENT: Patient currently scored a level 7 of 7 on Functional Communication Measures for swallowing indicating a 0% limitation in function.    ASSESSMENT/ PLAN OF CARE:  Pt presents with functional oropharyngeal swallow.  No clinical signs or symptoms of aspiration noted.      REHAB POTENTIAL:  Pt has good rehab potential.  The following limitations may influence improvement/ length of tx medical status.    RECOMMENDATIONS:   1.   DIET: Regular diet-thin liquids    2.  POSITION: 90 degrees upper    3.  COMPENSATORY STRATEGIES: General Swallow precautions    No further dysphagia therapy is indicated at this time    Pt/responsible party agrees with plan of care and has been informed of all alternatives, risks and benefits.  EDUCATION  The patient has been educated in the following areas:   Dysphagia (Swallowing Impairment).              Yeny Angeles, SLP  7/7/2025

## 2025-07-07 NOTE — DISCHARGE SUMMARY
[facility]         HOSPITALIST  DISCHARGE SUMMARY    Patient Name: Zoila Whipple  : 1940  MRN: [id number]    Date of Admission: 2025  Date of Discharge:  2025  Primary Care Physician: Jewel Chavez MD    Consults       Date and Time Order Name Status Description    2025 12:46 AM Inpatient Neurology Consult Stroke      2025 12:19 AM Inpatient Hospitalist Consult              Active and Resolved Hospital Problems:  Left basal ganglia infarct, small vessel  Left-sided weakness  Left-sided ataxia  Coronary artery disease  History of aortic valve replacement  Essential hypertension  Hypertriglyceridemia  Hypothyroidism  Non-insulin-dependent type 2 diabetes    Hospital Course     Hospital Course:  Zoila Whipple is a 84 y.o. female with coronary artery disease, hypertension, hyperlipidemia, type 2 diabetes, aortic valve replacement follows with cardiology in [place] who presented with left-sided weakness and ataxia.  Seen by neurology in the emergency room. NIHSS Score: 5.  Not candidate for thrombolytics as LKW outside 4.5 hr window.  MRI brain reported acute infarct involving small infarct involving left basal ganglia and midbrain. CT angiogram head and neck no hemodynamically significant stenoses.  Hemoglobin A1c 6.8.  Total cholesterol 117, LDL 26, triglycerides 485.  Patient was already taking aspirin and Plavix.  Neurology recommended switching Plavix to Brilinta and continuing aspirin.  Statin continued.  Cleared by speech therapy.  PT and OT recommended inpatient rehab however patient wanted to go home and family was fine with this.  Echo was ordered however she preferred to get this outpatient through her regular cardiologist.  Planned follow-up with PCP and neurology.  Discharged home in stable condition    Day of Discharge     Vital Signs:  Temp:  [97.5 °F (36.4 °C)-97.7 °F (36.5 °C)] 97.6 °F (36.4 °C)  Heart Rate:  [62-96] 96  Resp:  [14-20] 18  BP:  (142209)/(47-96) 156/50  Physical Exam:   GENERAL: Conversant and nontoxic.  HEART: No edema  LUNGS: nonlabored  ABDOMEN: Nondistended  NEUROLOGIC: Alert    Discharge Details        Discharge Medications        New Medications        Instructions Start Date   ticagrelor 90 MG tablet tablet  Commonly known as: Brilinta   90 mg, Oral, 2 Times Daily             Changes to Medications        Instructions Start Date   rosuvastatin 5 MG tablet  Commonly known as: CRESTOR  What changed: how much to take   10 mg, Oral, Daily             Continue These Medications        Instructions Start Date   allopurinol 100 MG tablet  Commonly known as: ZYLOPRIM   100 mg, Daily      apraclonidine 0.5 % ophthalmic solution  Commonly known as: IOPIDINE   Administer 1 drop into each eye 2 (Two) Times a Day.      aspirin 81 MG chewable tablet   81 mg, Daily      azelastine 0.1 % nasal spray  Commonly known as: ASTELIN   2 sprays, Nasal, 2 Times Daily      brimonidine 0.15 % ophthalmic solution  Commonly known as: ALPHAGAN   1 drop, 2 Times Daily      cholecalciferol 25 MCG (1000 UT) tablet  Commonly known as: VITAMIN D3   1,000 Units, Daily      Coenzyme Q10 200 MG capsule   200 mg, Daily      levothyroxine 75 MCG tablet  Commonly known as: Synthroid   75 mcg, Oral, Daily      metFORMIN  MG 24 hr tablet  Commonly known as: GLUCOPHAGE-XR   1,000 mg, Oral, Daily With Dinner      metoprolol tartrate 25 MG tablet  Commonly known as: LOPRESSOR   25 mg, Daily      pantoprazole 40 MG EC tablet  Commonly known as: PROTONIX   40 mg, Oral, Daily      potassium chloride 10 MEQ CR tablet   10 mEq, Daily      torsemide 20 MG tablet  Commonly known as: DEMADEX   20 mg, Daily      Uro- MG capsule   1 capsule, Oral, Daily PRN      valsartan 80 MG tablet  Commonly known as: DIOVAN   80 mg, Daily             Stop These Medications      cephalexin 250 MG capsule  Commonly known as: KEFLEX     clopidogrel 75 MG tablet  Commonly known as: Plavix               Allergies   Allergen Reactions    Medrol [Methylprednisolone] Rash    Niacin Rash    Sulfamethoxazole-Trimethoprim Rash       Discharge Disposition:  Home or Self Care    Diet:  Hospital:  Diet Order   Procedures    Diet: Regular/House, Diabetic; Consistent Carbohydrate; Fluid Consistency: Thin (IDDSI 0)       Discharge Activity:   Activity Instructions       Up WIth Assist              CODE STATUS:  There are no questions and answers to display.         Future Appointments   Date Time Provider Department Center   7/22/2025  2:45 PM Jewel Chavez MD [facility] [place] [facility]   8/18/2025  9:00 AM Kb Do MD [facility] [place] [facility]   11/21/2025 11:00 AM Jewel Chavez MD [facility]       Additional Instructions for the Follow-ups that You Need to Schedule       Ambulatory Referral to Neurology   As directed      New patient -3-4 week appointment if available    Discharge Follow-up with PCP   As directed       Currently Documented PCP:    Jewel Chavze MD    PCP Phone Number:    [phone]     Follow Up Details: on 7/22/2025        Discharge Follow-up with Specified Provider: Dr Ojeda; 1 Month   As directed      To: Dr Ojeda   Follow Up: 1 Month                Pertinent  and/or Most Recent Results     PROCEDURES:   NONE    LAB RESULTS:      Lab 07/06/25 2259   WBC 5.09   HEMOGLOBIN 10.6*   HEMATOCRIT 33.7*   PLATELETS 157   NEUTROS ABS 2.75   IMMATURE GRANS (ABS) 0.01   LYMPHS ABS 1.68   MONOS ABS 0.54   EOS ABS 0.08   MCV 89.4   PROTIME 14.3   APTT 30.3         Lab 07/06/25 2259   SODIUM 136   POTASSIUM 4.2   CHLORIDE 100   CO2 24.7   ANION GAP 11.3   BUN 16.2   CREATININE 0.90   EGFR 62.4   GLUCOSE 134*   CALCIUM 8.9         Lab 07/06/25 2259   TOTAL PROTEIN 7.0   ALBUMIN 4.2   GLOBULIN 2.8   ALT (SGPT) 10   AST (SGOT) 16   BILIRUBIN 0.2   ALK PHOS 148*         Lab 07/06/25 2259   PROTIME 14.3   INR 1.07         Lab 07/07/25  0807   CHOLESTEROL 117   LDL CHOL 26   HDL  CHOL 24*   TRIGLYCERIDES 485*         Lab 07/07/25  0807 07/06/25  2259   ABO TYPING AB AB   RH TYPING Positive Positive   ANTIBODY SCREEN  --  Negative         Brief Urine Lab Results  (Last result in the past 365 days)        Color   Clarity   Blood   Leuk Est   Nitrite   Protein   CREAT   Urine HCG        07/07/25 0139 Green   Slightly Cloudy   Large (3+)   Moderate (2+)   Positive   Negative                 Microbiology Results (last 10 days)       ** No results found for the last 240 hours. **            MRI Brain Without Contrast  Result Date: 7/7/2025  Impression: 1.Small areas of diffusion restriction abnormality within the left basal ganglia and midbrain compatible with late acute to subacute infarcts. 2.No acute intracranial hemorrhage. 3.White matter changes compatible small vessel ischemic disease in this age group are similar to the comparison. Findings were called to the patient's nurse in the PCU at the time of interpretation Electronically Signed: Harsh Beaver MD  7/7/2025 7:22 AM EDT  Workstation ID: [id number]    CT Angiogram Head w AI Analysis of LVO  Result Date: 7/7/2025  1.There is some plaque at both carotid bifurcations, left greater than right. However, no measurable ICA stenosis is seen on either side. 2.Widely patent vertebral arteries. 3.Smooth mild stenosis in the distal right M1. Mild multifocal atherosclerotic changes in the left posterior cerebral artery. No flow-limiting stenosis or large vessel occlusion is identified. Electronically Signed: Waylon Morfin MD  7/7/2025 1:16 AM EDT  Workstation ID: [id number]    CT Angiogram Neck  Result Date: 7/7/2025  1.There is some plaque at both carotid bifurcations, left greater than right. However, no measurable ICA stenosis is seen on either side. 2.Widely patent vertebral arteries. 3.Smooth mild stenosis in the distal right M1. Mild multifocal atherosclerotic changes in the left posterior cerebral artery. No flow-limiting stenosis or large  vessel occlusion is identified. Electronically Signed: Waylon Morfin MD  7/7/2025 1:16 AM EDT  Workstation ID: [id number]    XR Chest 1 View  Result Date: 7/7/2025  No active disease. Electronically Signed: Waylon Morfin MD  7/7/2025 12:29 AM EDT  Workstation ID: [id number]    CT CEREBRAL PERFUSION WITH & WITHOUT CONTRAST  Result Date: 7/7/2025  Normal perfusion scan with no evidence of ischemic tissue or infarct core. Electronically Signed: Waylon Morfin MD  7/7/2025 12:28 AM EDT  Workstation ID: [id number]    CT Head Without Contrast Stroke Protocol  Result Date: 7/6/2025  Senescent changes without acute abnormality. Electronically Signed: Waylon Morfin MD  7/6/2025 11:09 PM EDT  Workstation ID: [id number]       Results for orders placed during the hospital encounter of 02/08/23    Duplex Carotid Ultrasound CAR 02/08/2023  1:16 PM    Interpretation Summary    Bilateral nonstenotic mild to moderate carotid bulb plaque.    All other right side carotid system vessels are normal.    All other left side carotid system vessels are normal.    Vertebral flow is antegrade bilaterally.      Results for orders placed during the hospital encounter of 02/08/23    Duplex Carotid Ultrasound CAR 02/08/2023  1:16 PM    Interpretation Summary    Bilateral nonstenotic mild to moderate carotid bulb plaque.    All other right side carotid system vessels are normal.    All other left side carotid system vessels are normal.    Vertebral flow is antegrade bilaterally.          Labs Pending at Discharge:  Pending Labs       Order Current Status    Hemoglobin A1c In process            The ASCVD Risk score (Ross URBAN, et al., 2019) failed to calculate for the following reasons:    The 2019 ASCVD risk score is only valid for ages 40 to 79    Risk score cannot be calculated because patient has a medical history suggesting prior/existing ASCVD     Time spent on Discharge including face to face service:  31 minutes    Electronically signed by  Corey Haley DO, 07/07/25, 2:41 PM EDT.

## 2025-07-07 NOTE — PLAN OF CARE
Goal Outcome Evaluation:  Plan of Care Reviewed With: (P) patient           Outcome Evaluation: (P) Patient presents with minimal deficits in balance and endurance limiting safe ambulation. Skilled rehab services are required to address functional deficits.    Anticipated Discharge Disposition (PT): (P) inpatient rehabilitation facility

## 2025-07-07 NOTE — PLAN OF CARE
Goal Outcome Evaluation:  Plan of Care Reviewed With: patient        Progress: no change  Outcome Evaluation: Patient came in with left sided facial numbness, weakness to left arm and leg, and walked into ER on walker and ER RN stated she was veering to the left. Patient stated that symptoms have resolved except for left sided sensation. BP is a on the higher side but patient said she normally runs in the 160s. Came to the floor around 2am and has slept fairly well the rest of the time. Call light within reach at all times.

## 2025-07-07 NOTE — ED PROVIDER NOTES
"Time: 10:48 PM EDT  Date of encounter:  2025  Independent Historian/Clinical History and Information was obtained by:   Patient    History is limited by: N/A    Chief Complaint   Patient presents with    Numbness     Left side weakness that started at 11 am today. Pt takes plavix and aspirin . Pmhx of stroke and valve replacements as well as hypertension and diabetes. Pt had no previous deficits. Pt also experiencing unsteady gait and drifting to the left while walking          History of Present Illness:  Patient is a 84 y.o. year old female who presents to the emergency department for evaluation of left facial numbness, weakness to the left arm and leg. States that last well known was at 11am. Hx of 5 strokes. She is currently on plavix and aspirin. Per nurse, patient walked into triage on a walker and was veering to the left side. (Triage Provider: Oswaldo Nicholson PA-C).    Patient Care Team  Primary Care Provider: Jewel Chavez MD    Past Medical History:     Allergies   Allergen Reactions    Medrol [Methylprednisolone] Rash    Niacin Rash    Sulfamethoxazole-Trimethoprim Rash     Past Medical History:   Diagnosis Date    Acquired hypothyroidism     Coronary artery disease     Essential hypertension     GERD without esophagitis     H/O aortic valve replacement with porcine valve     Mixed hyperlipidemia     Type 2 diabetes mellitus      Past Surgical History:   Procedure Laterality Date    AORTIC VALVE REPAIR/REPLACEMENT      APPENDECTOMY      CORONARY STENT PLACEMENT      HYSTERECTOMY      INGUINAL HERNIA REPAIR Right     LAMINECTOMY       Family History   Problem Relation Age of Onset    No Known Problems Mother         Mother  94 years old \"old age\"    Heart attack Father         Father  49 years old MI       Home Medications:  Prior to Admission medications    Medication Sig Start Date End Date Taking? Authorizing Provider   allopurinol (ZYLOPRIM) 100 MG tablet Take 1 tablet by mouth Daily. " 4/24/25   Ender Cole MD   apraclonidine (IOPIDINE) 0.5 % ophthalmic solution Administer 1 drop into each eye 2 (Two) Times a Day. 3/13/25      aspirin 81 MG chewable tablet Chew 1 tablet Daily. 5/30/24   Ender Cole MD   azelastine (ASTELIN) 0.1 % nasal spray Administer 2 sprays into the nostril(s) as directed by provider 2 (Two) Times a Day. 1/9/25   Jewel Chavez MD   brimonidine (ALPHAGAN) 0.15 % ophthalmic solution Administer 1 drop to both eyes 2 (Two) Times a Day. 9/19/24   Ender Cole MD   cephalexin (KEFLEX) 250 MG capsule Take 1 capsule by mouth Daily. 5/11/22   Ender Cole MD   cholecalciferol (VITAMIN D3) 25 MCG (1000 UT) tablet Take 1 tablet by mouth Daily.    Ender Cole MD   clopidogrel (Plavix) 75 MG tablet Take 1 tablet by mouth Daily. 5/16/25   Jewel Chavez MD   Coenzyme Q10 200 MG capsule Take 200 mg by mouth Daily.    Ender Cole MD   levothyroxine (Synthroid) 75 MCG tablet Take 1 tablet by mouth Daily. 5/16/25   Jewel Chavez MD   metFORMIN ER (GLUCOPHAGE-XR) 500 MG 24 hr tablet Take 2 tablets by mouth Daily With Dinner. 5/16/25   Jewel Chavez MD   Meth-Hyo-M Bl-Na Phos-Ph Sal (Uro-MP) 118 MG capsule Take  by mouth Daily As Needed.    Ender Cole MD   metoprolol tartrate (LOPRESSOR) 25 MG tablet Take 1 tablet by mouth Daily.    Ender Cole MD   pantoprazole (PROTONIX) 40 MG EC tablet Take 1 tablet by mouth Daily. 5/16/25   Jewel Chavez MD   potassium chloride 10 MEQ CR tablet Take 1 tablet by mouth Daily. 7/29/21   Ender Cole MD   rosuvastatin (CRESTOR) 5 MG tablet Take 1 tablet by mouth Daily. 5/16/25   Jewel Chavez MD   torsemide (DEMADEX) 20 MG tablet Take 1 tablet by mouth Daily.    Ender Cole MD   valsartan (DIOVAN) 80 MG tablet Take 1 tablet by mouth Daily. 8/29/24 8/29/25  Ender Cole MD        Social History:  "  Social History     Tobacco Use    Smoking status: Never    Smokeless tobacco: Never   Vaping Use    Vaping status: Never Used   Substance Use Topics    Alcohol use: Never    Drug use: Never         Review of Systems:  Review of Systems   Constitutional:  Negative for chills and fever.   HENT:  Negative for congestion, ear pain and sore throat.    Eyes:  Negative for pain.   Respiratory:  Negative for cough, chest tightness and shortness of breath.    Cardiovascular:  Negative for chest pain.   Gastrointestinal:  Negative for abdominal pain, diarrhea, nausea and vomiting.   Genitourinary:  Negative for flank pain and hematuria.   Musculoskeletal:  Negative for joint swelling.   Skin:  Negative for pallor.   Neurological:  Positive for weakness and numbness. Negative for seizures and headaches.   All other systems reviewed and are negative.       Physical Exam:  /50   Pulse 71   Temp 97.7 °F (36.5 °C) (Oral)   Resp 16   Ht 166.4 cm (65.51\")   Wt 74.3 kg (163 lb 12.8 oz)   SpO2 97%   BMI 26.83 kg/m²         Physical Exam  Constitutional:       Appearance: Normal appearance.   HENT:      Head: Normocephalic and atraumatic.      Nose: Nose normal.      Mouth/Throat:      Mouth: Mucous membranes are moist.   Eyes:      Extraocular Movements: Extraocular movements intact.      Conjunctiva/sclera: Conjunctivae normal.      Pupils: Pupils are equal, round, and reactive to light.   Cardiovascular:      Rate and Rhythm: Normal rate and regular rhythm.      Pulses: Normal pulses.      Heart sounds: Normal heart sounds.   Pulmonary:      Effort: Pulmonary effort is normal.      Breath sounds: Normal breath sounds.   Abdominal:      General: There is no distension.      Palpations: Abdomen is soft.      Tenderness: There is no abdominal tenderness.   Musculoskeletal:         General: Normal range of motion.      Cervical back: Normal range of motion.   Skin:     General: Skin is warm and dry.      Capillary Refill: " Capillary refill takes less than 2 seconds.   Neurological:      Mental Status: She is alert and oriented to person, place, and time.      Comments: Left upper and lower extremity weakness 4+ out of 5.  Subjective decrease sensation in left upper lower extremity.   Psychiatric:         Mood and Affect: Mood normal.         Behavior: Behavior normal.                            Medical Decision Making:      Comorbidities that affect care:    GERD, Coronary Artery Disease, Diabetes, Hypertension, Thyroid Disease    External Notes reviewed:    Previous Clinic Note: Patient seen by PCP on 5/16/2025 for diabetes, hypertension, hyperlipidemia, hypothyroidism, gout, lacunar infarct, GERD      The following orders were placed and all results were independently analyzed by me:  Orders Placed This Encounter   Procedures    CT Head Without Contrast Stroke Protocol    CT Angiogram Head w AI Analysis of LVO    CT Angiogram Neck    CT CEREBRAL PERFUSION WITH & WITHOUT CONTRAST    XR Chest 1 View    [place] Draw    Comprehensive Metabolic Panel    Protime-INR    aPTT    Urinalysis With Microscopic If Indicated (No Culture) - Urine, Clean Catch    CBC Auto Differential    Basic Metabolic Panel    NPO Diet NPO Type: Strict NPO    Initiate Department's Acute Stroke Process (Team D, Code 19, etc)    Perform NIH Stroke Scale    Measure Actual Weight    Head of Bed 30 Degrees or Less    Undress and Gown    Continuous Pulse Oximetry    Vital Signs    Neuro Checks    Notify Provider for SBP <80 or >200    Notify Provider for SBP >140 (For Hemorrhagic Stroke)    No Hypotonic Fluids    Nursing Dysphagia Screening (Complete Prior to Giving anything PO)    RN to Place Order SLP Consult (IF swallow screen failed) - Eval & Treat Choosing Reason of RN Dysphagia Screen Failed    Vital Signs    Intake & Output    Weigh Patient    Oral Care    Maintain IV Access    Telemetry - Place Orders & Notify Provider of Results When Patient Experiences Acute  Chest Pain, Dysrhythmia or Respiratory Distress    Notify Provider (With Default Parameters)    Inpatient Hospitalist Consult    Oxygen Therapy- Nasal Cannula; Titrate 1-6 LPM Per SpO2; 90 - 95%    Incentive Spirometry    POC Glucose Once    POC Glucose Once    ECG 12 Lead ED Triage Standing Order; Acute Stroke (Onset <12 hrs)    Type & Screen    ABO RH Specimen Verification    Insert Large Bore Peripheral IV - Right AC Preferred    Insert Peripheral IV    Inpatient Admission    CBC & Differential    Green Top (Gel)    Lavender Top    Gold Top - SST    Light Blue Top    CBC & Differential       Medications Given in the Emergency Department:  Medications   sodium chloride 0.9 % flush 10 mL (has no administration in time range)   sodium chloride 0.9 % flush 10 mL (has no administration in time range)   sodium chloride 0.9 % flush 10 mL (has no administration in time range)   sodium chloride 0.9 % infusion 40 mL (has no administration in time range)   heparin (porcine) 5000 UNIT/ML injection 5,000 Units (has no administration in time range)   nitroglycerin (NITROSTAT) SL tablet 0.4 mg (has no administration in time range)   sodium chloride 0.9 % infusion (has no administration in time range)   acetaminophen (TYLENOL) tablet 650 mg (has no administration in time range)     Or   acetaminophen (TYLENOL) 160 MG/5ML oral solution 650 mg (has no administration in time range)     Or   acetaminophen (TYLENOL) suppository 650 mg (has no administration in time range)   sennosides-docusate (PERICOLACE) 8.6-50 MG per tablet 2 tablet (has no administration in time range)     And   polyethylene glycol (MIRALAX) packet 17 g (has no administration in time range)     And   bisacodyl (DULCOLAX) EC tablet 5 mg (has no administration in time range)     And   bisacodyl (DULCOLAX) suppository 10 mg (has no administration in time range)   ondansetron ODT (ZOFRAN-ODT) disintegrating tablet 4 mg (has no administration in time range)     Or    ondansetron (ZOFRAN) injection 4 mg (has no administration in time range)   iopamidol (ISOVUE-370) 76 % injection 150 mL (150 mL Intravenous Given 7/7/25 0018)        ED Course:    The patient was initially evaluated in the triage area where orders were placed. The patient was later dispositioned by Orquidea Jin MD.      The patient was advised to stay for completion of workup which includes but is not limited to communication of labs and radiological results, reassessment and plan. The patient was advised that leaving prior to disposition by a provider could result in critical findings that are not communicated to the patient.     ED Course as of 07/07/25 0044   Sun Jul 06, 2025 2249 PROVIDER IN TRIAGE  Patient was evaluated by Oswaldo ramirez PA-C. Orders were placed and awaiting final results and disposition.   [MV]      ED Course User Index  [MV] Oswaldo Nicholson PA       Labs:    Lab Results (last 24 hours)       Procedure Component Value Units Date/Time    POC Glucose Once [[id number]]  (Abnormal) Collected: 07/06/25 2249    Specimen: Blood Updated: 07/06/25 2251     Glucose 135 mg/dL      Comment: Serial Number: [id number]:  [id number]       CBC & Differential [[id number]]  (Abnormal) Collected: 07/06/25 2259    Specimen: Blood Updated: 07/06/25 2305    Narrative:      The following orders were created for panel order CBC & Differential.  Procedure                               Abnormality         Status                     ---------                               -----------         ------                     CBC Auto Differential[[id number]]        Abnormal            Final result                 Please view results for these tests on the individual orders.    Comprehensive Metabolic Panel [[id number]]  (Abnormal) Collected: 07/06/25 2259    Specimen: Blood Updated: 07/06/25 2331     Glucose 134 mg/dL      BUN 16.2 mg/dL      Creatinine 0.90 mg/dL      Sodium 136 mmol/L      Potassium 4.2 mmol/L       Chloride 100 mmol/L      CO2 24.7 mmol/L      Calcium 8.9 mg/dL      Total Protein 7.0 g/dL      Albumin 4.2 g/dL      ALT (SGPT) 10 U/L      AST (SGOT) 16 U/L      Alkaline Phosphatase 148 U/L      Total Bilirubin 0.2 mg/dL      Globulin 2.8 gm/dL      A/G Ratio 1.5 g/dL      BUN/Creatinine Ratio 18.0     Anion Gap 11.3 mmol/L      eGFR 62.4 mL/min/1.73     Narrative:      GFR Categories in Chronic Kidney Disease (CKD)              GFR Category          GFR (mL/min/1.73)    Interpretation  G1                    90 or greater        Normal or high (1)  G2                    60-89                Mild decrease (1)  G3a                   45-59                Mild to moderate decrease  G3b                   30-44                Moderate to severe decrease  G4                    15-29                Severe decrease  G5                    14 or less           Kidney failure    (1)In the absence of evidence of kidney disease, neither GFR category G1 or G2 fulfill the criteria for CKD.    eGFR calculation 2021 CKD-EPI creatinine equation, which does not include race as a factor    Protime-INR [[id number]]  (Normal) Collected: 07/06/25 2259    Specimen: Blood Updated: 07/06/25 2315     Protime 14.3 Seconds      INR 1.07    Narrative:      Suggested Therapeutic Ranges For Oral Anticoagulant Therapy:  Level of Therapy                      INR Target Range  Standard Dose                            2.0-3.0  High Dose                                2.5-3.5  Patients not receiving anticoagulant  Therapy Normal Range                     0.86-1.15    aPTT [[id number]]  (Normal) Collected: 07/06/25 2259    Specimen: Blood Updated: 07/06/25 2315     PTT 30.3 seconds     CBC Auto Differential [[id number]]  (Abnormal) Collected: 07/06/25 2259    Specimen: Blood Updated: 07/06/25 2305     WBC 5.09 10*3/mm3      RBC 3.77 10*6/mm3      Hemoglobin 10.6 g/dL      Hematocrit 33.7 %      MCV 89.4 fL      MCH 28.1 pg      MCHC 31.5 g/dL      RDW  14.3 %      RDW-SD 46.2 fl      MPV 11.3 fL      Platelets 157 10*3/mm3      Neutrophil % 54.0 %      Lymphocyte % 33.0 %      Monocyte % 10.6 %      Eosinophil % 1.6 %      Basophil % 0.6 %      Immature Grans % 0.2 %      Neutrophils, Absolute 2.75 10*3/mm3      Lymphocytes, Absolute 1.68 10*3/mm3      Monocytes, Absolute 0.54 10*3/mm3      Eosinophils, Absolute 0.08 10*3/mm3      Basophils, Absolute 0.03 10*3/mm3      Immature Grans, Absolute 0.01 10*3/mm3      nRBC 0.0 /100 WBC              Imaging:    XR Chest 1 View  Result Date: 7/7/2025  XR CHEST 1 VW Date of Exam: 7/7/2025 12:26 AM EDT Indication: Acute Stroke Protocol (Onset < 12 hrs) Comparison: 7/26/2024 Findings: Heart is enlarged status post sternotomy. There is some chronic scarring at the right lung base. The lungs are otherwise clear. Pulmonary vascularity is normal. No pneumothorax. There is atherosclerotic disease in the aorta.     No active disease. Electronically Signed: Waylon Morfin MD  7/7/2025 12:29 AM EDT  Workstation ID: [id number]    CT CEREBRAL PERFUSION WITH & WITHOUT CONTRAST  Result Date: 7/7/2025  CT CEREBRAL PERFUSION W WO CONTRAST Date of Exam: 7/7/2025 12:16 AM EDT Indication: Left-sided numbness.  Comparison: Head CT 7/6/2025 Technique: Axial CT images of the brain were obtained prior to and after the uneventful intravenous administration of iodinated contrast. Core blood volume, core blood flow, mean transit time, and Tmax images were obtained utilizing the Rapid software protocol. A limited CT angiogram of the head was also performed to measure the blood vessel density. The radiation dose reduction device was turned on for each scan per the ALARA (As Low as Reasonably Achievable) protocol. Findings: Perfusion maps are symmetric with no evidence of ischemic at risk tissue or infarct core. Total CBF less than 30%: 0 mL. Total Tmax greater than 6 seconds: 0 mL. Mismatch volume: 0 mL. Mismatch ratio: None.     Normal perfusion scan  with no evidence of ischemic tissue or infarct core. Electronically Signed: Waylon Morfin MD  7/7/2025 12:28 AM EDT  Workstation ID: [id number]    CT Head Without Contrast Stroke Protocol  Result Date: 7/6/2025  CT HEAD WO CONTRAST STROKE PROTOCOL HISTORY: Left-sided numbness. TECHNIQUE: Axial unenhanced head CT with multiplanar reformats. Radiation dose reduction techniques included automated exposure control or exposure modulation based on body size. COMPARISON: Brain MRI 2/8/2023 FINDINGS: Ventricular size and configuration are normal. No acute infarct or hemorrhage is identified. There are no masses. There is no skull fracture. There is atrophy with mild chronic small vessel ischemic disease in the white matter. Atherosclerotic calcifications are present in the carotid siphons and left distal vertebral artery.     Senescent changes without acute abnormality. Electronically Signed: Waylon Morfin MD  7/6/2025 11:09 PM EDT  Workstation ID: [id number]        Differential Diagnosis and Discussion:      Stroke: Differential diagnosis in this patient with signs of possible ischemic stroke include TIA or ischemic stroke, hemorrhagic stroke, hypoglycemic episode, toxic or metabolic encephalopathy, paresthesias.    PROCEDURES:    Labs were collected in the emergency department and all labs were reviewed and interpreted by me.  X-ray were performed in the emergency department and all X-ray impressions were independently interpreted by me.  An EKG was performed and the EKG was interpreted by me.  CT scan was performed in the emergency department and the CT scan radiology impression was interpreted by me.    ECG 12 Lead ED Triage Standing Order; Acute Stroke (Onset <12 hrs)    (Results Pending)        Procedures    MDM  Number of Diagnoses or Management Options  Cerebrovascular accident (CVA), unspecified mechanism  Diagnosis management comments: Patient is afebrile nontoxic-appearing.  Vital signs stable.  Patient presented  to the emergency department with left-sided weakness.  CT did not show acute findings.  Patient was seen by teleneurology who recommended admission for stroke workup.  Patient will be admitted for further care.       Amount and/or Complexity of Data Reviewed  Clinical lab tests: reviewed  Tests in the radiology section of CPT®: reviewed  Review and summarize past medical records: yes  Independent visualization of images, tracings, or specimens: yes    Risk of Complications, Morbidity, and/or Mortality  Presenting problems: moderate  Management options: moderate                     Patient Care Considerations:    SEPSIS was considered but is NOT present in the emergency department as SIRS criteria is not present.      Consultants/Shared Management Plan:    Hospitalist: I have discussed the case with Dr Hayes who agrees to accept the patient for admission.    Social Determinants of Health:    Patient is independent, reliable, and has access to care.       Disposition and Care Coordination:    Admit:   Through independent evaluation of the patient's history, physical, and imperical data, the patient meets criteria for inpatient admission to the hospital.        Final diagnoses:   Cerebrovascular accident (CVA), unspecified mechanism        ED Disposition       ED Disposition   Decision to Admit    Condition   --    Comment   Level of Care: Progressive Care [20]  Diagnosis: Stroke [[id number]]  Certification: I Certify That Inpatient Hospital Services Are Medically Necessary For Greater Than 2 Midnights                 This medical record created using voice recognition software.             Orquidea Jin MD  07/07/25 0044

## 2025-07-07 NOTE — PROGRESS NOTES
Transition of Care Pharmacist Note -- Future Cost Assessment:     Pharmacy test claim ran for the following on 7/7/2025:    Drug Covered/PA required Patient Copay per month   Brilinta (ticagrelor) Covered without PA $ 21.18       Evaluation:   Patient Insurance Type: Medicare Advantage  Is the medication eligible for trial card/copay card? No - coupon not available for this drug    Meds to Beds Enrollment? No (meds to beds recommended as not all retail pharmacies routinely stock brand name medications)    For billing questions, reach out to [facility] at extension [phone].  For meds to beds questions, reach out to [facility] at extension [phone].    Lucila Tran, PharmD   Transition of Care Pharmacist

## 2025-07-07 NOTE — THERAPY EVALUATION
Patient Name: Zoila Whipple  : 1940    MRN: [id number]                              Today's Date: 2025       Admit Date: 2025    Visit Dx:     ICD-10-CM ICD-9-CM   1. Cerebrovascular accident (CVA), unspecified mechanism  I63.9 434.91   2. Impaired mobility and ADLs  Z74.09 V49.89    Z78.9      Patient Active Problem List   Diagnosis    Type 2 diabetes mellitus    S/P aortic valve replacement with prosthetic valve    Mixed hyperlipidemia    GERD without esophagitis    Acquired hypothyroidism    Essential hypertension    H/O aortic valve replacement with porcine valve    Lacunar infarction    Chronic idiopathic gout involving toe of right foot without tophus    Stroke     Past Medical History:   Diagnosis Date    Acquired hypothyroidism     Coronary artery disease     Essential hypertension     GERD without esophagitis     H/O aortic valve replacement with porcine valve     Mixed hyperlipidemia     Type 2 diabetes mellitus      Past Surgical History:   Procedure Laterality Date    AORTIC VALVE REPAIR/REPLACEMENT      APPENDECTOMY      CORONARY STENT PLACEMENT      HYSTERECTOMY      INGUINAL HERNIA REPAIR Right     LAMINECTOMY        General Information       Row Name 25 1038          OT Time and Intention    Document Type evaluation  -AC     Mode of Treatment individual therapy;occupational therapy  -AC     Patient Effort good  -AC       Row Name 25 1038          General Information    Patient Profile Reviewed yes  -AC     Prior Level of Function --  patient reports (I) with adls and functional mobility without an AD. She states she has a shower chair but doesn't use and has a regular toilet in place. Pt. was driving at [place].  -AC     Existing Precautions/Restrictions fall  -AC     Barriers to Rehab none identified  -AC       Row Name 25 1038          Occupational Profile    Reason for Services/Referral (Occupational Profile) Pt. is a 84 year old female admitted for the above  diagnosis. Pt. referred to OT services to assess independence with ADLs and adl transfers/fx'l mobility. No previous OT services for current condition.  -       Row Name 07/07/25 1038          Living Environment    Current Living Arrangements home  -     People in Home alone  -       Row Name 07/07/25 1038          Cognition    Orientation Status (Cognition) oriented x 3  -       Row Name 07/07/25 1038          Safety Issues/Impairments Affecting Functional Mobility    Impairments Affecting Function (Mobility) balance;endurance/activity tolerance;strength  -               User Key  (r) = Recorded By, (t) = Taken By, (c) = Cosigned By      Initials Name Provider Type     Niki Garsia, OT Occupational Therapist                     Mobility/ADL's       Row Name 07/07/25 1040          Bed Mobility    Bed Mobility bed mobility (all) activities  -     All Activities, [place] (Bed Mobility) contact guard;1 person assist;verbal cues  -       Row Name 07/07/25 1040          Transfers    Transfers sit-stand transfer  -       Row Name 07/07/25 1040          Sit-Stand Transfer    Sit-Stand [place] (Transfers) contact guard;1 person assist;verbal cues  -     Assistive Device (Sit-Stand Transfers) walker, front-wheeled  -       Row Name 07/07/25 1040          Functional Mobility    Functional Mobility- Ind. Level contact guard assist;1 person;verbal cues required  -     Functional Mobility- Device walker, front-wheeled  -     Functional Mobility- Comment patient was CGA with functional mobility at EOB, unsteady.  -       Row Name 07/07/25 1040          Activities of Daily Living    BADL Assessment/Intervention --  Patient is setup for self-feeding, setup for grooming. setup for upper body bathing/dressing, cga/min A for lower body bathing/dressing, CGA/min A for toileting.  -               User Key  (r) = Recorded By, (t) = Taken By, (c) = Cosigned By      Initials Name Provider Type    AC  Niki Garsia OT Occupational Therapist                   Obj/Interventions       [facility] Name 07/07/25 1042          Sensory Assessment (Somatosensory)    Sensory Assessment (Somatosensory) UE sensation intact  -[facility] Name 07/07/25 1042          Vision Assessment/Intervention    Visual Impairment/Limitations WFL;corrective lenses full-time  -AC       Row Name 07/07/25 1042          Range of Motion Comprehensive    General Range of Motion bilateral upper extremity ROM WFL  -AC       Row Name 07/07/25 1042          Strength Comprehensive (MMT)    Comment, General Manual Muscle Testing (MMT) Assessment LUE 4/5, RUE 5/5  -[facility] Name 07/07/25 1042          Motor Skills    Motor Skills coordination;functional endurance  -     Coordination left;upper extremity;fine motor deficit;minimal impairment;gross motor deficit  -     Functional Endurance fair for adls, patient nauseous with bed mobility, nursing notified  -AC       Row Name 07/07/25 1042          Balance    Balance Assessment standing dynamic balance  -     Dynamic Standing Balance contact guard;1-person assist;verbal cues  -     Position/Device Used, Standing Balance supported;walker, front-wheeled  -     Balance Interventions standing;sit to stand;supported;dynamic;minimal challenge;occupation based/functional task  -               User Key  (r) = Recorded By, (t) = Taken By, (c) = Cosigned By      Initials Name Provider Type     Niki Garsia OT Occupational Therapist                   Goals/Plan       Row Name 07/07/25 1046          Bed Mobility Goal 1 (OT)    Activity/Assistive Device (Bed Mobility Goal 1, OT) bed mobility activities, all  -     [place] Level/Cues Needed (Bed Mobility Goal 1, OT) modified independence  -     Time Frame (Bed Mobility Goal 1, OT) long term goal (LTG);10 days  -[facility] Name 07/07/25 1046          Transfer Goal 1 (OT)    Activity/Assistive Device (Transfer Goal 1, OT) transfers, all  -AC      [place] Level/Cues Needed (Transfer Goal 1, OT) modified independence  -AC     Time Frame (Transfer Goal 1, OT) long term goal (LTG);10 days  -AC       Row Name 07/07/25 1046          Bathing Goal 1 (OT)    Activity/Device (Bathing Goal 1, OT) bathing skills, all  -AC     [place] Level/Cues Needed (Bathing Goal 1, OT) modified independence  -AC     Time Frame (Bathing Goal 1, OT) long term goal (LTG);10 days  -AC       Row Name 07/07/25 1046          Dressing Goal 1 (OT)    Activity/Device (Dressing Goal 1, OT) dressing skills, all  -AC     [place]/Cues Needed (Dressing Goal 1, OT) modified independence  -AC     Time Frame (Dressing Goal 1, OT) long term goal (LTG);10 days  -AC       Row Name 07/07/25 1046          Toileting Goal 1 (OT)    Activity/Device (Toileting Goal 1, OT) toileting skills, all  -AC     [place] Level/Cues Needed (Toileting Goal 1, OT) modified independence  -AC     Time Frame (Toileting Goal 1, OT) long term goal (LTG);10 days  -AC       Row Name 07/07/25 1046          Strength Goal 1 (OT)    Strength Goal 1 (OT) Patient will demonstrate LUE strength of 5/5 for adls.  -AC     Time Frame (Strength Goal 1, OT) long term goal (LTG);10 days  -       Row Name 07/07/25 1046          Problem Specific Goal 1 (OT)    Problem Specific Goal 1 (OT) patient will demonstrate good activity tolerance for adls.  -AC     Time Frame (Problem Specific Goal 1, OT) long term goal (LTG);10 days  -       Row Name 07/07/25 1046          Therapy Assessment/Plan (OT)    Planned Therapy Interventions (OT) activity tolerance training;functional balance retraining;occupation/activity based interventions;patient/caregiver education/training;transfer/mobility retraining;ROM/therapeutic exercise;BADL retraining  -AC               User Key  (r) = Recorded By, (t) = Taken By, (c) = Cosigned By      Initials Name Provider Type    Niki Mckenzie OT Occupational Therapist                   Clinical  Impression       Row Name 07/07/25 1045          Pain Assessment    Pretreatment Pain Rating 0/10 - no pain  -AC     Posttreatment Pain Rating 0/10 - no pain  -AC       Row Name 07/07/25 1045          Plan of Care Review    Plan of Care Reviewed With patient  -     Progress no change  -     Outcome Evaluation Patient presents with balance, endurance, strength limitations that impede his/her ability to perform ADLS. The skills of a therapist are necessary to maximize independence with ADLs.  -       Row Name 07/07/25 1045          Therapy Assessment/Plan (OT)    Patient/Family Therapy Goal Statement (OT) Patient would like to maximize independence with adls.  -     Rehab Potential (OT) good  -     Criteria for Skilled Therapeutic Interventions Met (OT) yes;meets criteria;skilled treatment is necessary  -     Therapy Frequency (OT) 5 times/wk  -       Row Name 07/07/25 1040          Therapy Plan Review/Discharge Plan (OT)    Equipment Needs Upon Discharge (OT) commode chair;walker, rolling  -AC     Anticipated Discharge Disposition (OT) inpatient rehabilitation facility  -       Row Name 07/07/25 1048          Positioning and Restraints    Pre-Treatment Position in bed  -AC     Post Treatment Position bed  -AC     In Bed fowlers;call light within reach;encouraged to call for assist;exit alarm on  -               User Key  (r) = Recorded By, (t) = Taken By, (c) = Cosigned By      Initials Name Provider Type    AC Niki Garsia, KAYLEE Occupational Therapist                   Outcome Measures       Row Name 07/07/25 1049          How much help from another is currently needed...    Putting on and taking off regular lower body clothing? 3  -AC     Bathing (including washing, rinsing, and drying) 3  -AC     Toileting (which includes using toilet bed pan or urinal) 3  -AC     Putting on and taking off regular upper body clothing 4  -AC     Taking care of personal grooming (such as brushing teeth) 4  -AC      Eating meals 4  -AC     AM-PAC 6 Clicks Score (OT) 21  -AC       Row Name 07/07/25 0721 07/07/25 0203       How much help from another person do you currently need...    Turning from your back to your side while in flat bed without using bedrails? 4  -MD 4  -RN    Moving from lying on back to sitting on the side of a flat bed without bedrails? 4  -MD 4  -RN    Moving to and from a bed to a chair (including a wheelchair)? 4  -MD 4  -RN    Standing up from a chair using your arms (e.g., wheelchair, bedside chair)? 4  -MD 4  -RN    Climbing 3-5 steps with a railing? 4  -MD 4  -RN    To walk in hospital room? 4  -MD 4  -RN    AM-PAC 6 Clicks Score (PT) 24  -MD 24  -RN      Row Name 07/07/25 0200          How much help from another person do you currently need...    Turning from your back to your side while in flat bed without using bedrails? 4  -RN     Moving from lying on back to sitting on the side of a flat bed without bedrails? 4  -RN     Moving to and from a bed to a chair (including a wheelchair)? 4  -RN     Standing up from a chair using your arms (e.g., wheelchair, bedside chair)? 4  -RN     Climbing 3-5 steps with a railing? 4  -RN     To walk in hospital room? 4  -RN     AM-PAC 6 Clicks Score (PT) 24  -RN       Row Name 07/07/25 1047          Functional Assessment    Outcome Measure Options AM-PAC 6 Clicks Daily Activity (OT);Optimal Instrument  -AC       Row Name 07/07/25 1047          Optimal Instrument    Optimal Instrument Optimal - 3  -AC     Bending/Stooping 1  -AC     Standing 2  -AC     Reaching 2  -AC     From the list, choose the 3 activities you would most like to be able to do without any difficulty Bending/stooping;Standing;Reaching  -AC     Total Score Optimal - 3 5  -AC               User Key  (r) = Recorded By, (t) = Taken By, (c) = Cosigned By      Initials Name Provider Type    AC Niki Garsia OT Occupational Therapist    Spring Gambino, RN Registered Nurse    Carley Guy RN  Registered Nurse                    Occupational Therapy Education       Title: PT OT SLP Therapies (Done)       Topic: Occupational Therapy (Done)       Point: ADL training (Done)       Learning Progress Summary            Patient Acceptance, E, VU by  at 7/7/2025 1048                      Point: Home exercise program (Done)       Learning Progress Summary            Patient Acceptance, E, VU by  at 7/7/2025 1048                      Point: Precautions (Done)       Learning Progress Summary            Patient Acceptance, E, VU by  at 7/7/2025 1048                      Point: Body mechanics (Done)       Learning Progress Summary            Patient Acceptance, E, VU by  at 7/7/2025 1048                                      User Key       Initials Effective Dates Name Provider Type Discipline     06/16/21 -  Niki Garsia OT Occupational Therapist OT                  OT Recommendation and Plan  Planned Therapy Interventions (OT): activity tolerance training, functional balance retraining, occupation/activity based interventions, patient/caregiver education/training, transfer/mobility retraining, ROM/therapeutic exercise, BADL retraining  Therapy Frequency (OT): 5 times/wk  Plan of Care Review  Plan of Care Reviewed With: patient  Progress: no change  Outcome Evaluation: Patient presents with balance, endurance, strength limitations that impede his/her ability to perform ADLS. The skills of a therapist are necessary to maximize independence with ADLs.     Time Calculation:   Evaluation Complexity (OT)  Review Occupational Profile/Medical/Therapy History Complexity: expanded/moderate complexity  Assessment, Occupational Performance/Identification of Deficit Complexity: 1-3 performance deficits  Clinical Decision Making Complexity (OT): problem focused assessment/low complexity  Overall Complexity of Evaluation (OT): low complexity     Time Calculation- OT       Row Name 07/07/25 1049             Time  Calculation- OT    OT Received On 07/07/25  -AC      OT Goal Re-Cert Due Date 07/16/25  -AC         Untimed Charges    OT Eval/Re-eval Minutes 31  -AC         Total Minutes    Untimed Charges Total Minutes 31  -AC       Total Minutes 31  -AC                User Key  (r) = Recorded By, (t) = Taken By, (c) = Cosigned By      Initials Name Provider Type     Niki Garsia OT Occupational Therapist                  Therapy Charges for Today       Code Description Service Date Service Provider Modifiers Qty    [id number] HC OT EVAL LOW COMPLEXITY 3 7/7/2025 Niki Garsia OT GO 1                 Niki Garsia OT  7/7/2025

## 2025-07-07 NOTE — TELEPHONE ENCOUNTER
Patient's daughter called to schedule patient hosp follow up, patient discharging 7/7/25 from  [facility] due to stroke. Soonest avail is 7/22/25, for now she has been scheduled for that date but they are needing her to follow up sooner due to her uncontrolled blood pressure. Please advise.

## 2025-07-08 ENCOUNTER — TELEPHONE (OUTPATIENT)
Dept: FAMILY MEDICINE CLINIC | Age: 85
End: 2025-07-08
Payer: MEDICARE

## 2025-07-08 ENCOUNTER — TRANSITIONAL CARE MANAGEMENT TELEPHONE ENCOUNTER (OUTPATIENT)
Dept: CALL CENTER | Facility: HOSPITAL | Age: 85
End: 2025-07-08
Payer: MEDICARE

## 2025-07-08 DIAGNOSIS — R11.0 NAUSEA: Primary | ICD-10-CM

## 2025-07-08 DIAGNOSIS — R11.0 NAUSEA: ICD-10-CM

## 2025-07-08 RX ORDER — ONDANSETRON 4 MG/1
4 TABLET, FILM COATED ORAL EVERY 8 HOURS PRN
Qty: 6 TABLET | Refills: 0 | Status: SHIPPED | OUTPATIENT
Start: 2025-07-08 | End: 2025-07-08 | Stop reason: SDUPTHER

## 2025-07-08 RX ORDER — ONDANSETRON 4 MG/1
4 TABLET, FILM COATED ORAL EVERY 8 HOURS PRN
Qty: 6 TABLET | Refills: 0 | Status: SHIPPED | OUTPATIENT
Start: 2025-07-08

## 2025-07-08 NOTE — TELEPHONE ENCOUNTER
"    Caller: Zoila Whipple \"Zoila [place]\"    Relationship: Self    Best call back number: [phone]    What medication are you requesting: Something for nausea    What are your current symptoms: Nausea and vomiting    How long have you been experiencing symptoms: 1 day    Have you had these symptoms before:    [] Yes  [] No    Have you been treated for these symptoms before:   [] Yes  [] No    If a prescription is needed, what is your preferred pharmacy and phone number:  [facility] [phone]     Additional notes: Patient is requesting a medication for nausea and vomiting        "

## 2025-07-08 NOTE — TELEPHONE ENCOUNTER
I will send her in a small supply of nausea medication but she should be seen if nausea persists for greater than 1 to 2 days or if she starts to run a fever, have signs or symptoms of dehydration.  Dr. Chase

## 2025-07-08 NOTE — OUTREACH NOTE
Call Center TCM Note      Flowsheet Row Responses   [facility] patient discharged from? [facility]   Does the patient have one of the following disease processes/diagnoses(primary or secondary)? Stroke   TCM attempt successful? Yes   Call start time 1056   Call end time 1100   Discharge diagnosis Stroke   Person spoke with today (if not patient) and relationship pt   Meds reviewed with patient/caregiver? Yes   Is the patient having any side effects they believe may be caused by any medication additions or changes? No   Does the patient have all medications ordered at discharge? Yes   Is the patient taking all medications as directed (includes completed medication regime)? Yes   Does the patient have an appointment with their PCP within 7-14 days of discharge? No   Nursing Interventions Routed TCM call to PCP office, PCP office requested to make appointment - message sent   Does the patient require any assistance with activities of daily living such as eating, bathing, dressing, walking, etc.? No   Does the patient have any residual symptoms from stroke/TIA? No   Does the patient understand the diet ordered at discharge? Yes   Did the patient receive a copy of their discharge instructions? Yes   Nursing interventions Reviewed instructions with patient   What is the patient's perception of their health status since discharge? Improving   Nursing interventions Nurse provided patient education   Is the patient/caregiver able to teach back the risk factors for a stroke? High Cholesterol, History of TIAs   Is the patient/caregiver able to teach back signs and symptoms related to disease process for when to call PCP? Yes   Is the patient/caregiver able to teach back signs and symptoms related to disease process for when to call 911? Yes   If the patient is a current smoker, are they able to teach back resources for cessation? Not a smoker   Is the patient/caregiver able to teach back the hierarchy of who to call/visit  for symptoms/problems? PCP, Specialist, Home health nurse, Urgent Care, ED, 911 Yes   Is the patient able to teach back FAST for Stroke? B alance: Watch for sudden loss of balance, S peech: Listen for slurred speech, T wendy: Call 9-1-1 right away, A rm: Check if one arm is weak   TCM call completed? Yes   Wrap up additional comments Pt states she is feeling nauseous still. Pt advised to call PCP office to see if PCP will call in rx for Zofran. Reviewed AVS/meds with pt. Pt verified PCP fu appt   Call end time 1100   Would this patient benefit from a Referral to Amb Social Work? No   Is the patient interested in additional calls from an ambulatory ? No            Minoo ESPINOSA - Registered Nurse    7/8/2025, 11:11 EDT

## 2025-07-09 NOTE — TELEPHONE ENCOUNTER
Okay with me to use an open 15-minute slot if available.  Otherwise, work in at the end of the day.  Thanks.   Health Care Proxy (HCP)

## 2025-07-15 ENCOUNTER — OFFICE VISIT (OUTPATIENT)
Dept: FAMILY MEDICINE CLINIC | Age: 85
End: 2025-07-15
Payer: MEDICARE

## 2025-07-15 VITALS
TEMPERATURE: 97.9 F | WEIGHT: 161.6 LBS | SYSTOLIC BLOOD PRESSURE: 152 MMHG | OXYGEN SATURATION: 97 % | HEART RATE: 63 BPM | BODY MASS INDEX: 25.97 KG/M2 | DIASTOLIC BLOOD PRESSURE: 60 MMHG | HEIGHT: 66 IN

## 2025-07-15 DIAGNOSIS — I10 ESSENTIAL HYPERTENSION: ICD-10-CM

## 2025-07-15 DIAGNOSIS — I63.9 CEREBROVASCULAR ACCIDENT (CVA), UNSPECIFIED MECHANISM: Primary | ICD-10-CM

## 2025-07-15 DIAGNOSIS — E03.9 ACQUIRED HYPOTHYROIDISM: ICD-10-CM

## 2025-07-15 DIAGNOSIS — Z79.899 OTHER LONG TERM (CURRENT) DRUG THERAPY: ICD-10-CM

## 2025-07-15 DIAGNOSIS — E78.2 MIXED HYPERLIPIDEMIA: ICD-10-CM

## 2025-07-15 DIAGNOSIS — E11.9 TYPE 2 DIABETES MELLITUS WITHOUT COMPLICATION, WITHOUT LONG-TERM CURRENT USE OF INSULIN: ICD-10-CM

## 2025-07-15 DIAGNOSIS — N39.0 RECURRENT UTI: ICD-10-CM

## 2025-07-15 DIAGNOSIS — R21 RASH: ICD-10-CM

## 2025-07-15 LAB
BACTERIA UR QL AUTO: ABNORMAL /HPF
BILIRUB UR QL STRIP: NEGATIVE
CLARITY UR: ABNORMAL
COLOR UR: YELLOW
GLUCOSE UR STRIP-MCNC: NEGATIVE MG/DL
HGB UR QL STRIP.AUTO: ABNORMAL
HYALINE CASTS UR QL AUTO: ABNORMAL /LPF
KETONES UR QL STRIP: NEGATIVE
LEUKOCYTE ESTERASE UR QL STRIP.AUTO: ABNORMAL
NITRITE UR QL STRIP: NEGATIVE
PH UR STRIP.AUTO: 6.5 [PH] (ref 5–8)
PROT UR QL STRIP: ABNORMAL
RBC # UR STRIP: ABNORMAL /HPF
REF LAB TEST METHOD: ABNORMAL
SP GR UR STRIP: 1.01 (ref 1–1.03)
SQUAMOUS #/AREA URNS HPF: ABNORMAL /HPF
UROBILINOGEN UR QL STRIP: ABNORMAL
WBC # UR STRIP: ABNORMAL /HPF

## 2025-07-15 PROCEDURE — 1111F DSCHRG MED/CURRENT MED MERGE: CPT | Performed by: FAMILY MEDICINE

## 2025-07-15 PROCEDURE — 81001 URINALYSIS AUTO W/SCOPE: CPT | Performed by: FAMILY MEDICINE

## 2025-07-15 PROCEDURE — 87086 URINE CULTURE/COLONY COUNT: CPT | Performed by: FAMILY MEDICINE

## 2025-07-15 PROCEDURE — 1160F RVW MEDS BY RX/DR IN RCRD: CPT | Performed by: FAMILY MEDICINE

## 2025-07-15 PROCEDURE — 87186 SC STD MICRODIL/AGAR DIL: CPT | Performed by: FAMILY MEDICINE

## 2025-07-15 PROCEDURE — 99495 TRANSJ CARE MGMT MOD F2F 14D: CPT | Performed by: FAMILY MEDICINE

## 2025-07-15 PROCEDURE — 1159F MED LIST DOCD IN RCRD: CPT | Performed by: FAMILY MEDICINE

## 2025-07-15 PROCEDURE — 87077 CULTURE AEROBIC IDENTIFY: CPT | Performed by: FAMILY MEDICINE

## 2025-07-15 PROCEDURE — 1126F AMNT PAIN NOTED NONE PRSNT: CPT | Performed by: FAMILY MEDICINE

## 2025-07-15 RX ORDER — ACYCLOVIR 50 MG/G
1 CREAM TOPICAL
Qty: 5 G | Refills: 1 | Status: SHIPPED | OUTPATIENT
Start: 2025-07-15

## 2025-07-15 RX ORDER — CLONIDINE HYDROCHLORIDE 0.1 MG/1
0.1 TABLET ORAL
COMMUNITY
Start: 2025-07-10 | End: 2026-07-10

## 2025-07-15 RX ORDER — ROSUVASTATIN CALCIUM 10 MG/1
10 TABLET, COATED ORAL DAILY
Qty: 90 TABLET | Refills: 3 | Status: SHIPPED | OUTPATIENT
Start: 2025-07-15

## 2025-07-15 RX ORDER — TICAGRELOR 90 MG/1
90 TABLET, FILM COATED ORAL 2 TIMES DAILY
Qty: 180 TABLET | Refills: 0 | Status: SHIPPED | OUTPATIENT
Start: 2025-07-15

## 2025-07-15 NOTE — PROGRESS NOTES
Zoila Whipple presents to [facility].    Chief Complaint:  Hospital follow up, stroke    Transitional Care Management:  - Zoila Dixon was admitted to [facility] on 7/6/2025 with discharge on 7/7/2025.  - While inpatient, she was cared for by the hospitalist service including Dr. Haley.  - Staff from [facility] reach out to her within 48 hours of hospital discharge arrange follow-up.  - Her problems and medications have been reviewed and reconciled.    Subjective   History of Present Illness:  Zoila Dixon is being seen today for follow-up on her care.  She had a recent stroke for which she was cared for at [facility] as noted.  I have reviewed her discharge summary and testing.  She has been back home now for about a week.  She went to the hospital because of left-sided facial numbness and a lack of coordination involving the left arm.  She says those symptoms are significantly better, but they have not resolved completely.  She did have changes to her medication at hospital discharge.  Specifically, she was changed from clopidogrel to Brilinta.  So far, she seems to be tolerating that okay.    Regarding hypertension, her blood pressure is mildly elevated here.  She saw cardiology 5 days ago, and they did not recommend an immediate change to her medication for blood pressure.  She was prescribed clonidine for as needed use for a blood pressure greater than 180 systolic.    Regarding type 2 diabetes, she continues to take metformin regularly.  Her glucose readings have been reasonable since getting home from the hospital.  She did have an A1c of 6.8% while inpatient.    Zoila was taking cephalexin prior to hospital admission for recurrent UTI.  She had been on that medication for a long period of time.  Cephalexin was discontinued at hospital discharge.  The rationale for that is not completely clear.  She is having some concern about mild urinary discomfort at this time and would  "like to have the urine checked.    Since getting home, she has developed a rash along the left side of her face.  She is not sure as to the cause for this.  She thinks it could be related to medication.    Review of Systems:  Review of Systems   Constitutional:  Negative for chills and fever.   Respiratory:  Positive for shortness of breath. Negative for cough.    Cardiovascular:  Negative for chest pain, palpitations and leg swelling.   Gastrointestinal:  Positive for nausea and vomiting (last week - this resolved). Negative for abdominal pain.      Objective   Medical History:  Past Medical History:    Acquired hypothyroidism    Coronary artery disease    Essential hypertension    GERD without esophagitis    H/O aortic valve replacement with porcine valve    Mixed hyperlipidemia    Type 2 diabetes mellitus     Past Surgical History:    AORTIC VALVE REPAIR/REPLACEMENT    APPENDECTOMY    CORONARY STENT PLACEMENT    HYSTERECTOMY    INGUINAL HERNIA REPAIR    LAMINECTOMY      Family History   Problem Relation Age of Onset    No Known Problems Mother         Mother  94 years old \"old age\"    Heart attack Father         Father  49 years old MI     Social History     Tobacco Use    Smoking status: Never    Smokeless tobacco: Never   Substance Use Topics    Alcohol use: Never       Health Maintenance Due   Topic Date Due    TDAP/TD VACCINES (1 - Tdap) Never done    DIABETIC EYE EXAM  2025    DXA SCAN  2025        Immunization History   Administered Date(s) Administered    ABRYSVO (RSV, 60+ or pregnant women 32-36 wks) 2024    COVID-19 (MODERNA) 1st,2nd,3rd Dose Monovalent 2021, 2021    COVID-19 (PFIZER) Purple Cap Monovalent 2021    Covid-19 (Pfizer) Gray Cap Monovalent 2022    Fluzone  >6mos 10/08/2013    Fluzone High-Dose 65+YRS 2017, 2024    Fluzone High-Dose 65+yrs 2021, 10/20/2022, 2023    Pneumococcal Conjugate 13-Valent (PCV13) 2016    " Pneumococcal Polysaccharide (PPSV23) 09/21/2017    Shingrix 06/09/2023, 09/05/2023       Allergies   Allergen Reactions    Medrol [Methylprednisolone] Rash    Niacin Rash    Sulfamethoxazole-Trimethoprim Rash        Medications:    Current Outpatient Medications:     allopurinol (ZYLOPRIM) 100 MG tablet, Take 1 tablet by mouth Daily., Disp: , Rfl:     apraclonidine (IOPIDINE) 0.5 % ophthalmic solution, Administer 1 drop into each eye 2 (Two) Times a Day., Disp: 5 mL, Rfl: 1    aspirin 81 MG chewable tablet, Chew 1 tablet Daily., Disp: , Rfl:     azelastine (ASTELIN) 0.1 % nasal spray, Administer 2 sprays into the nostril(s) as directed by provider 2 (Two) Times a Day. (Patient taking differently: Administer 2 sprays into the nostril(s) as directed by provider 2 (Two) Times a Day As Needed.), Disp: 30 mL, Rfl: 2    brimonidine (ALPHAGAN) 0.15 % ophthalmic solution, Administer 1 drop to both eyes 2 (Two) Times a Day., Disp: , Rfl:     cholecalciferol (VITAMIN D3) 25 MCG (1000 UT) tablet, Take 1 tablet by mouth Daily., Disp: , Rfl:     cloNIDine (CATAPRES) 0.1 MG tablet, Take 1 tablet by mouth.  Take 1 tablet by mouth 3 (three) times a day if needed for high blood pressure (for SBP >180)., Disp: , Rfl:     Coenzyme Q10 200 MG capsule, Take 200 mg by mouth Daily., Disp: , Rfl:     levothyroxine (Synthroid) 75 MCG tablet, Take 1 tablet by mouth Daily., Disp: 90 tablet, Rfl: 3    metFORMIN ER (GLUCOPHAGE-XR) 500 MG 24 hr tablet, Take 2 tablets by mouth Daily With Dinner., Disp: 180 tablet, Rfl: 3    Meth-Hyo-M Bl-Na Phos-Ph Sal (Uro-MP) 118 MG capsule, Take 1 capsule by mouth Daily As Needed., Disp: , Rfl:     metoprolol tartrate (LOPRESSOR) 25 MG tablet, Take 1 tablet by mouth Daily., Disp: , Rfl:     pantoprazole (PROTONIX) 40 MG EC tablet, Take 1 tablet by mouth Daily., Disp: 90 tablet, Rfl: 3    potassium chloride 10 MEQ CR tablet, Take 1 tablet by mouth Daily., Disp: , Rfl:     rosuvastatin (CRESTOR) 10 MG tablet,  "Take 1 tablet by mouth Daily., Disp: 90 tablet, Rfl: 3    ticagrelor (Brilinta) 90 MG tablet tablet, Take 1 tablet by mouth 2 (Two) Times a Day., Disp: 180 tablet, Rfl: 0    torsemide (DEMADEX) 20 MG tablet, Take 1 tablet by mouth Daily., Disp: , Rfl:     valsartan (DIOVAN) 80 MG tablet, Take 1 tablet by mouth Daily., Disp: , Rfl:     acyclovir (ZOVIRAX) 5 % cream, Apply 1 Application topically to the appropriate area as directed 5 (Five) Times a Day., Disp: 5 g, Rfl: 1    Vital Signs:   /60 (BP Location: Right arm, Patient Position: Sitting) Comment: manual  Pulse 63   Temp 97.9 °F (36.6 °C) (Oral)   Ht 167.6 cm (65.98\")   Wt 73.3 kg (161 lb 9.6 oz)   SpO2 97%   BMI 26.10 kg/m²       Physical Exam:  Physical Exam  Vitals reviewed.   Constitutional:       General: She is not in acute distress.     Appearance: She is not ill-appearing.   Eyes:      Pupils: Pupils are equal, round, and reactive to light.   Neck:      Comments: No thyromegaly  Cardiovascular:      Rate and Rhythm: Normal rate and regular rhythm.   Pulmonary:      Effort: Pulmonary effort is normal.      Breath sounds: Normal breath sounds.   Abdominal:      General: There is no distension.      Palpations: Abdomen is soft.      Tenderness: There is no abdominal tenderness.   Musculoskeletal:      Cervical back: Neck supple.   Lymphadenopathy:      Cervical: No cervical adenopathy.   Skin:     Findings: Rash (There is a rash involving the left side of her face.  There are some blistered lesions present.) present. No lesion.   Neurological:      Mental Status: She is alert.     Result Review   The following data was reviewed by Jewel Chavez MD on 07/15/2025.  Lab Results   Component Value Date    WBC 5.09 07/06/2025    HGB 10.6 (L) 07/06/2025    HCT 33.7 (L) 07/06/2025    MCV 89.4 07/06/2025     07/06/2025     Lab Results   Component Value Date    GLUCOSE 134 (H) 07/06/2025    BUN 16.2 07/06/2025    CREATININE 0.90 07/06/2025 "     07/06/2025    K 4.2 07/06/2025     07/06/2025    CALCIUM 8.9 07/06/2025    PROTEINTOT 7.0 07/06/2025    ALBUMIN 4.2 07/06/2025    ALT 10 07/06/2025    AST 16 07/06/2025    ALKPHOS 148 (H) 07/06/2025    BILITOT 0.2 07/06/2025    GLOB 2.8 07/06/2025    AGRATIO 1.5 07/06/2025    BCR 18.0 07/06/2025    ANIONGAP 11.3 07/06/2025    EGFR 62.4 07/06/2025     Lab Results   Component Value Date    CHOL 117 07/07/2025    CHLPL 119 03/10/2021    TRIG 485 (H) 07/07/2025    HDL 24 (L) 07/07/2025    LDL 26 07/07/2025     Lab Results   Component Value Date    TSH 7.470 (H) 05/16/2025     Lab Results   Component Value Date    HGBA1C 6.80 (H) 07/07/2025     MRI Brain Without Contrast (07/07/2025 07:00)  CT Angiogram Neck (07/07/2025 00:50)  CT Angiogram Head w AI Analysis of LVO (07/07/2025 00:50)  XR Chest 1 View (07/07/2025 00:26)  CT CEREBRAL PERFUSION WITH & WITHOUT CONTRAST (07/07/2025 00:18)  CT Head Without Contrast Stroke Protocol (07/06/2025 23:02)    Lipid Panel (07/07/2025 08:07)  Hemoglobin A1c (07/07/2025 08:07)  Urinalysis, Microscopic Only - Urine, Clean Catch (07/07/2025 01:39)  Urinalysis With Microscopic If Indicated (No Culture) - Urine, Clean Catch (07/07/2025 01:39)  Comprehensive Metabolic Panel (07/06/2025 22:59)  CBC & Differential (07/06/2025 22:59)         Assessment and Plan:   Today, we have reviewed her care.  Zoila Dixon seems well today.  There is really not any new physical exam finding of concern other than this facial rash.  It may represent some type of herpetic rash.  We will go ahead and recommend covering it with topical acyclovir for the near term.  Cardiology recommended she take Brilinta for at least 90 days, and we will move ahead with a longer-term refill of it because of that.  Her Crestor dose will be increased as per the discharge summary.  Otherwise, we will plan to reach out to her in about a month for follow-up labs as a precaution.  Urinalysis will be rechecked today.  We  will also run a urine culture.  Tentative follow-up with me will be in November as scheduled, but sooner if needed.    Diagnoses and all orders for this visit:    1. Cerebrovascular accident (CVA), unspecified mechanism (Primary)  -     ticagrelor (Brilinta) 90 MG tablet tablet; Take 1 tablet by mouth 2 (Two) Times a Day.  Dispense: 180 tablet; Refill: 0    2. Type 2 diabetes mellitus without complication, without long-term current use of insulin  -     Comprehensive Metabolic Panel; Future  -     Vitamin B12 & Folate; Future    3. Essential hypertension  -     Comprehensive Metabolic Panel; Future    4. Mixed hyperlipidemia  -     rosuvastatin (CRESTOR) 10 MG tablet; Take 1 tablet by mouth Daily.  Dispense: 90 tablet; Refill: 3  -     Comprehensive Metabolic Panel; Future  -     Lipid Panel; Future    5. Acquired hypothyroidism  -     TSH; Future    6. Recurrent UTI  -     Urinalysis With Microscopic - Urine, Clean Catch  -     Urine Culture - Urine, Urine, Clean Catch    7. Rash  -     acyclovir (ZOVIRAX) 5 % cream; Apply 1 Application topically to the appropriate area as directed 5 (Five) Times a Day.  Dispense: 5 g; Refill: 1    8. Other long term (current) drug therapy  -     CBC Auto Differential; Future  -     Vitamin B12 & Folate; Future    Follow Up  Return in about 4 months (around 11/21/2025) for Recheck, Next scheduled follow up.  Patient was given instructions and counseling regarding her condition or for health maintenance advice. Please see specific information pulled into the AVS if appropriate.

## 2025-07-16 ENCOUNTER — RESULTS FOLLOW-UP (OUTPATIENT)
Dept: FAMILY MEDICINE CLINIC | Age: 85
End: 2025-07-16
Payer: MEDICARE

## 2025-07-16 ENCOUNTER — TELEPHONE (OUTPATIENT)
Dept: FAMILY MEDICINE CLINIC | Age: 85
End: 2025-07-16
Payer: MEDICARE

## 2025-07-16 DIAGNOSIS — N39.0 RECURRENT UTI: Primary | ICD-10-CM

## 2025-07-16 RX ORDER — FAMCICLOVIR 500 MG/1
500 TABLET ORAL 2 TIMES DAILY
Qty: 10 TABLET | Refills: 0 | Status: SHIPPED | OUTPATIENT
Start: 2025-07-16

## 2025-07-16 RX ORDER — CEFADROXIL 500 MG/1
500 CAPSULE ORAL 2 TIMES DAILY
Qty: 14 CAPSULE | Refills: 0 | Status: SHIPPED | OUTPATIENT
Start: 2025-07-16 | End: 2025-07-17

## 2025-07-16 NOTE — TELEPHONE ENCOUNTER
Caller: ALLI TORRES    Relationship: Emergency Contact    Best call back number: [phone]     What medication are you requesting:     ACYCLOVIR PILLS    If a prescription is needed, what is your preferred pharmacy and phone number: [facility] [address] [place], [place] - [address] [facility] [phone] [facility] [phone] FX     Additional notes:    CREAM COST OVER $200.00    PER CALLER PLEASE SEND IN PILLS.    CONTACT CALLER TO ADVISE.

## 2025-07-17 LAB — BACTERIA SPEC AEROBE CULT: ABNORMAL

## 2025-07-17 RX ORDER — LEVOFLOXACIN 500 MG/1
500 TABLET, FILM COATED ORAL DAILY
Qty: 7 TABLET | Refills: 0 | Status: SHIPPED | OUTPATIENT
Start: 2025-07-17

## 2025-07-21 ENCOUNTER — READMISSION MANAGEMENT (OUTPATIENT)
Dept: CALL CENTER | Facility: HOSPITAL | Age: 85
End: 2025-07-21
Payer: MEDICARE

## 2025-07-21 ENCOUNTER — TELEPHONE (OUTPATIENT)
Dept: FAMILY MEDICINE CLINIC | Age: 85
End: 2025-07-21
Payer: MEDICARE

## 2025-07-21 RX ORDER — CLOTRIMAZOLE 1 %
1 CREAM (GRAM) TOPICAL 2 TIMES DAILY
Qty: 45 G | Refills: 1 | Status: SHIPPED | OUTPATIENT
Start: 2025-07-21

## 2025-07-21 NOTE — TELEPHONE ENCOUNTER
Pt states the urinary issues are better, but the sores on her mouth and chin are not much better and she did complete the Famciclovir.

## 2025-07-21 NOTE — TELEPHONE ENCOUNTER
----- Message from Whitney PALOMINO sent at 7/17/2025  1:00 PM EDT -----   TICKLE to call her on Monday to see how she is doing. (Urinary issues)

## 2025-07-21 NOTE — OUTREACH NOTE
Stroke Week 2 Survey      Flowsheet Row Responses   [facility] facility patient discharged from? [place]   Does the patient have one of the following disease processes/diagnoses(primary or secondary)? Stroke   Week 2 attempt successful? No   Unsuccessful attempts Attempt 1   Revoke Dionisio Rutledge Registered Nurse

## 2025-07-21 NOTE — TELEPHONE ENCOUNTER
Noted.  I have sent in a different type of cream to the pharmacy here for her to use.  It is aimed more at yeast or similar issue.  Thanks.

## 2025-07-22 LAB
QT INTERVAL: 472 MS
QTC INTERVAL: 509 MS

## 2025-07-25 ENCOUNTER — TELEPHONE (OUTPATIENT)
Dept: FAMILY MEDICINE CLINIC | Age: 85
End: 2025-07-25
Payer: MEDICARE

## 2025-07-25 DIAGNOSIS — Z78.0 POSTMENOPAUSAL STATE: Primary | ICD-10-CM

## 2025-07-25 NOTE — TELEPHONE ENCOUNTER
----- Message from Marley NAPOLES sent at 7/25/2023  9:06 AM EDT -----  TORRES    
Pt inf re; tickle, order placed  
corrected

## 2025-08-12 ENCOUNTER — RESULTS FOLLOW-UP (OUTPATIENT)
Dept: LAB | Facility: HOSPITAL | Age: 85
End: 2025-08-12
Payer: MEDICARE

## 2025-08-12 ENCOUNTER — TELEPHONE (OUTPATIENT)
Dept: FAMILY MEDICINE CLINIC | Age: 85
End: 2025-08-12
Payer: MEDICARE

## 2025-08-12 ENCOUNTER — LAB (OUTPATIENT)
Dept: LAB | Facility: HOSPITAL | Age: 85
End: 2025-08-12
Payer: MEDICARE

## 2025-08-12 DIAGNOSIS — R30.0 DYSURIA: Primary | ICD-10-CM

## 2025-08-12 DIAGNOSIS — R30.0 DYSURIA: ICD-10-CM

## 2025-08-12 LAB
BACTERIA UR QL AUTO: ABNORMAL /HPF
BILIRUB UR QL STRIP: NEGATIVE
CLARITY UR: ABNORMAL
COLOR UR: YELLOW
GLUCOSE UR STRIP-MCNC: NEGATIVE MG/DL
HGB UR QL STRIP.AUTO: ABNORMAL
KETONES UR QL STRIP: NEGATIVE
LEUKOCYTE ESTERASE UR QL STRIP.AUTO: ABNORMAL
NITRITE UR QL STRIP: POSITIVE
PH UR STRIP.AUTO: 5.5 [PH] (ref 5–8)
PROT UR QL STRIP: NEGATIVE
RBC # UR STRIP: ABNORMAL /HPF
REF LAB TEST METHOD: ABNORMAL
SP GR UR STRIP: 1.01 (ref 1–1.03)
SQUAMOUS #/AREA URNS HPF: ABNORMAL /HPF
UROBILINOGEN UR QL STRIP: ABNORMAL
WBC # UR STRIP: ABNORMAL /HPF
WBC CLUMPS # UR AUTO: ABNORMAL /HPF

## 2025-08-12 PROCEDURE — 81001 URINALYSIS AUTO W/SCOPE: CPT

## 2025-08-12 PROCEDURE — 87186 SC STD MICRODIL/AGAR DIL: CPT

## 2025-08-12 PROCEDURE — 87077 CULTURE AEROBIC IDENTIFY: CPT

## 2025-08-12 PROCEDURE — 87086 URINE CULTURE/COLONY COUNT: CPT

## 2025-08-12 RX ORDER — CEFADROXIL 500 MG/1
500 CAPSULE ORAL 2 TIMES DAILY
Qty: 20 CAPSULE | Refills: 0 | Status: SHIPPED | OUTPATIENT
Start: 2025-08-12

## 2025-08-12 RX ORDER — FLUCONAZOLE 150 MG/1
TABLET ORAL
Qty: 2 TABLET | Refills: 0 | Status: SHIPPED | OUTPATIENT
Start: 2025-08-12

## 2025-08-14 LAB — BACTERIA SPEC AEROBE CULT: ABNORMAL

## 2025-08-19 ENCOUNTER — LAB (OUTPATIENT)
Dept: LAB | Facility: HOSPITAL | Age: 85
End: 2025-08-19
Payer: MEDICARE

## 2025-08-19 DIAGNOSIS — E78.2 MIXED HYPERLIPIDEMIA: ICD-10-CM

## 2025-08-19 DIAGNOSIS — Z79.899 OTHER LONG TERM (CURRENT) DRUG THERAPY: ICD-10-CM

## 2025-08-19 DIAGNOSIS — I10 ESSENTIAL HYPERTENSION: ICD-10-CM

## 2025-08-19 DIAGNOSIS — E11.9 TYPE 2 DIABETES MELLITUS WITHOUT COMPLICATION, WITHOUT LONG-TERM CURRENT USE OF INSULIN: ICD-10-CM

## 2025-08-19 DIAGNOSIS — E03.9 ACQUIRED HYPOTHYROIDISM: ICD-10-CM

## 2025-08-19 LAB
ALBUMIN SERPL-MCNC: 4 G/DL (ref 3.5–5.2)
ALBUMIN/GLOB SERPL: 1.3 G/DL
ALP SERPL-CCNC: 136 U/L (ref 39–117)
ALT SERPL W P-5'-P-CCNC: 12 U/L (ref 1–33)
ANION GAP SERPL CALCULATED.3IONS-SCNC: 9.2 MMOL/L (ref 5–15)
AST SERPL-CCNC: 22 U/L (ref 1–32)
BASOPHILS # BLD AUTO: 0.01 10*3/MM3 (ref 0–0.2)
BASOPHILS NFR BLD AUTO: 0.2 % (ref 0–1.5)
BILIRUB SERPL-MCNC: 0.4 MG/DL (ref 0–1.2)
BUN SERPL-MCNC: 23 MG/DL (ref 8–23)
BUN/CREAT SERPL: 21.3 (ref 7–25)
CALCIUM SPEC-SCNC: 9.3 MG/DL (ref 8.6–10.5)
CHLORIDE SERPL-SCNC: 104 MMOL/L (ref 98–107)
CHOLEST SERPL-MCNC: 81 MG/DL (ref 0–200)
CO2 SERPL-SCNC: 25.8 MMOL/L (ref 22–29)
CREAT SERPL-MCNC: 1.08 MG/DL (ref 0.57–1)
DEPRECATED RDW RBC AUTO: 49.8 FL (ref 37–54)
EGFRCR SERPLBLD CKD-EPI 2021: 50.1 ML/MIN/1.73
EOSINOPHIL # BLD AUTO: 0.12 10*3/MM3 (ref 0–0.4)
EOSINOPHIL NFR BLD AUTO: 2.2 % (ref 0.3–6.2)
ERYTHROCYTE [DISTWIDTH] IN BLOOD BY AUTOMATED COUNT: 15.5 % (ref 12.3–15.4)
FOLATE SERPL-MCNC: 13.4 NG/ML (ref 4.78–24.2)
GLOBULIN UR ELPH-MCNC: 3 GM/DL
GLUCOSE SERPL-MCNC: 110 MG/DL (ref 65–99)
HCT VFR BLD AUTO: 32.2 % (ref 34–46.6)
HDLC SERPL-MCNC: 24 MG/DL (ref 40–60)
HGB BLD-MCNC: 10.5 G/DL (ref 12–15.9)
IMM GRANULOCYTES # BLD AUTO: 0.01 10*3/MM3 (ref 0–0.05)
IMM GRANULOCYTES NFR BLD AUTO: 0.2 % (ref 0–0.5)
LDLC SERPL CALC-MCNC: 18 MG/DL (ref 0–100)
LDLC/HDLC SERPL: 0.17 {RATIO}
LYMPHOCYTES # BLD AUTO: 1.67 10*3/MM3 (ref 0.7–3.1)
LYMPHOCYTES NFR BLD AUTO: 30.7 % (ref 19.6–45.3)
MCH RBC QN AUTO: 28.4 PG (ref 26.6–33)
MCHC RBC AUTO-ENTMCNC: 32.6 G/DL (ref 31.5–35.7)
MCV RBC AUTO: 87 FL (ref 79–97)
MONOCYTES # BLD AUTO: 0.71 10*3/MM3 (ref 0.1–0.9)
MONOCYTES NFR BLD AUTO: 13.1 % (ref 5–12)
NEUTROPHILS NFR BLD AUTO: 2.92 10*3/MM3 (ref 1.7–7)
NEUTROPHILS NFR BLD AUTO: 53.6 % (ref 42.7–76)
PLATELET # BLD AUTO: 191 10*3/MM3 (ref 140–450)
PMV BLD AUTO: 11.4 FL (ref 6–12)
POTASSIUM SERPL-SCNC: 5 MMOL/L (ref 3.5–5.2)
PROT SERPL-MCNC: 7 G/DL (ref 6–8.5)
RBC # BLD AUTO: 3.7 10*6/MM3 (ref 3.77–5.28)
SODIUM SERPL-SCNC: 139 MMOL/L (ref 136–145)
TRIGL SERPL-MCNC: 265 MG/DL (ref 0–150)
TSH SERPL DL<=0.05 MIU/L-ACNC: 1.16 UIU/ML (ref 0.27–4.2)
VIT B12 BLD-MCNC: 211 PG/ML (ref 211–946)
VLDLC SERPL-MCNC: 39 MG/DL (ref 5–40)
WBC NRBC COR # BLD AUTO: 5.44 10*3/MM3 (ref 3.4–10.8)

## 2025-08-19 PROCEDURE — 82746 ASSAY OF FOLIC ACID SERUM: CPT

## 2025-08-19 PROCEDURE — 80061 LIPID PANEL: CPT

## 2025-08-19 PROCEDURE — 80053 COMPREHEN METABOLIC PANEL: CPT

## 2025-08-19 PROCEDURE — 82607 VITAMIN B-12: CPT

## 2025-08-19 PROCEDURE — 84443 ASSAY THYROID STIM HORMONE: CPT

## 2025-08-19 PROCEDURE — 85025 COMPLETE CBC W/AUTO DIFF WBC: CPT

## 2025-08-19 PROCEDURE — 36415 COLL VENOUS BLD VENIPUNCTURE: CPT
